# Patient Record
Sex: FEMALE | Race: WHITE | NOT HISPANIC OR LATINO | Employment: OTHER | URBAN - METROPOLITAN AREA
[De-identification: names, ages, dates, MRNs, and addresses within clinical notes are randomized per-mention and may not be internally consistent; named-entity substitution may affect disease eponyms.]

---

## 2017-03-06 ENCOUNTER — TRANSFERRED RECORDS (OUTPATIENT)
Dept: HEALTH INFORMATION MANAGEMENT | Facility: CLINIC | Age: 62
End: 2017-03-06

## 2017-05-05 ENCOUNTER — AMBULATORY - HEALTHEAST (OUTPATIENT)
Dept: INTERNAL MEDICINE | Facility: CLINIC | Age: 62
End: 2017-05-05

## 2017-05-05 ENCOUNTER — COMMUNICATION - HEALTHEAST (OUTPATIENT)
Dept: INTERNAL MEDICINE | Facility: CLINIC | Age: 62
End: 2017-05-05

## 2017-07-21 ENCOUNTER — MEDICAL CORRESPONDENCE (OUTPATIENT)
Dept: HEALTH INFORMATION MANAGEMENT | Facility: CLINIC | Age: 62
End: 2017-07-21

## 2018-01-08 ENCOUNTER — RECORDS - HEALTHEAST (OUTPATIENT)
Dept: ADMINISTRATIVE | Facility: OTHER | Age: 63
End: 2018-01-08

## 2018-03-22 ENCOUNTER — TRANSFERRED RECORDS (OUTPATIENT)
Dept: HEALTH INFORMATION MANAGEMENT | Facility: CLINIC | Age: 63
End: 2018-03-22

## 2018-05-25 ENCOUNTER — TRANSFERRED RECORDS (OUTPATIENT)
Dept: HEALTH INFORMATION MANAGEMENT | Facility: CLINIC | Age: 63
End: 2018-05-25

## 2018-07-19 ENCOUNTER — TELEPHONE (OUTPATIENT)
Dept: OPHTHALMOLOGY | Facility: CLINIC | Age: 63
End: 2018-07-19

## 2018-07-19 NOTE — TELEPHONE ENCOUNTER
[facility]    Phone Message    May a detailed message be left on voicemail: yes    Reason for Call: Other: Pt has an appt with Nettie on 07/24 that she would like to reschedule but Nettie's next appt is not until 09/04. I offered to schedule Pt with Dr Watts on 07/31 who also does see for glaucoma as well. Pt is concerned because she has a fairly complicated glaucoma case. She would like to seen sooner than what is available but would like to be seen by someone who specializes in it. Pt states that she has gone through most of the known glaucoma eye medication/drops that most people use to help and Pt is coming to the end of the medication line. At this point, Pt's eyes are really red and mattery. Her vision is not good and she is having some complications between the 3 medications that she is taking right now as well. Pt will be on the road but will hang by her phone and would like to speak to someone asa to resolve this scheduling conflict right away. Pt wants to know if she can't reschedule her appt with Nettie to a date that is sooner than what is available, if seeing Stefanie for her complicated case would suffice. Please call and leave a detailed vm if unable to reach Pt on her mobile.    Action Taken: Message routed to:  [facility]    
After speaking to Yadiel the soonest we could get her in is Aug 16 at either 8 am or 130 pm.   I left a message on Katty's mobile phone # stating that she can make either apt and if those deluca not work we will have to look at our sched again.     
No

## 2018-07-24 ENCOUNTER — OFFICE VISIT (OUTPATIENT)
Dept: OPHTHALMOLOGY | Facility: CLINIC | Age: 63
End: 2018-07-24
Attending: OPHTHALMOLOGY
Payer: COMMERCIAL

## 2018-07-24 DIAGNOSIS — Z96.1 PSEUDOPHAKIA OF BOTH EYES: ICD-10-CM

## 2018-07-24 DIAGNOSIS — H40.1132 PRIMARY OPEN ANGLE GLAUCOMA OF BOTH EYES, MODERATE STAGE: Primary | ICD-10-CM

## 2018-07-24 DIAGNOSIS — H40.9 GLAUCOMA: ICD-10-CM

## 2018-07-24 PROCEDURE — 92083 EXTENDED VISUAL FIELD XM: CPT | Mod: ZF | Performed by: OPHTHALMOLOGY

## 2018-07-24 PROCEDURE — 92133 CPTRZD OPH DX IMG PST SGM ON: CPT | Mod: ZF | Performed by: OPHTHALMOLOGY

## 2018-07-24 PROCEDURE — G0463 HOSPITAL OUTPT CLINIC VISIT: HCPCS | Mod: ZF

## 2018-07-24 RX ORDER — ESTRADIOL 0.1 MG/G
1 CREAM VAGINAL PRN
COMMUNITY
Start: 2016-06-23 | End: 2021-09-10

## 2018-07-24 RX ORDER — BETAXOLOL HYDROCHLORIDE 5 MG/ML
1 SOLUTION/ DROPS OPHTHALMIC 2 TIMES DAILY
COMMUNITY
Start: 2016-12-06 | End: 2019-01-17

## 2018-07-24 RX ORDER — BRINZOLAMIDE 10 MG/ML
1 SUSPENSION/ DROPS OPHTHALMIC 2 TIMES DAILY
COMMUNITY
End: 2019-02-22

## 2018-07-24 ASSESSMENT — VISUAL ACUITY
OS_CC: 20/20
OD_CC+: -1
METHOD: SNELLEN - LINEAR
OD_CC: 20/20

## 2018-07-24 ASSESSMENT — REFRACTION_WEARINGRX
OS_AXIS: 021
SPECS_TYPE: PAL
OD_AXIS: 164
OS_SPHERE: -0.50
OD_ADD: +1.50
OD_CYLINDER: +0.75
OD_SPHERE: -2.00
OS_ADD: +1.50
OS_CYLINDER: +0.75

## 2018-07-24 ASSESSMENT — CONF VISUAL FIELD
METHOD: COUNTING FINGERS
OD_NORMAL: 1
OS_NORMAL: 1

## 2018-07-24 ASSESSMENT — CUP TO DISC RATIO
OS_RATIO: 0.8
OD_RATIO: 0.9

## 2018-07-24 ASSESSMENT — PACHYMETRY
OD_CT(UM): 557
OS_CT(UM): 536

## 2018-07-24 ASSESSMENT — TONOMETRY
IOP_METHOD: APPLANATION
OS_IOP_MMHG: 12
OS_IOP_MMHG: 10
OD_IOP_MMHG: 12
OD_IOP_MMHG: 10
IOP_METHOD: TONOPEN

## 2018-07-24 ASSESSMENT — SLIT LAMP EXAM - LIDS
COMMENTS: NORMAL
COMMENTS: NORMAL

## 2018-07-24 NOTE — PROGRESS NOTES
1)POAG -- s/p SLT OU (OD:1/17 and OS:3/17), H/O Hypotension in past that is now in normal range, H/O Bradycardia and Migraines in past, first told about cupping in 20's -- K pachy: 557/536   Tmax:(28/30 in 11/13) but usu C-[id number]-G82w per old notes     HVF: OD:Superior arcuate and OS: Full (Full OU in 2010 with OD:superior arcuate developing in 2014)  CDR: 0.9/0.8    HRT/OCT: OD:Mild RNFl thinning and OS:RNFL WNL      FHX of Glc: No      Gonio:       Intolerant to: AGN -- itchy eyes, Dorzolamide -- injection, T1/2 -- per old notes but never tried 2/2 concern over heartrate, Latanoprost -- injection/itchy (could re-try if necessary), Azopt -- blurry       Asthma/COPD: No   Steroid Use: No     Kidney Stones: No    Sulfa Allergy:  No    IOP targets:?M-Hteens (OD:Mteens and OS:Hteens) -- IOP good   2)MGD/HOANG s/p punctal plugs by Dr. 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  A long discussion of the risks, benefits, and alternatives including potential treatment and management options were had with patient and a decision was made to trial off Azopt (brinzolamide) which is an orange top drop.  Patient will return to clinic in 1-2 months with repeat IOP check and gonioscopy.     Attending Physician Attestation:  Complete documentation of historical and exam elements from today's encounter can be found in the full encounter summary report (not reduplicated in this progress note). I personally obtained the chief complaint(s) and history of present illness.  I confirmed and edited as necessary the review of systems, past medical/surgical history, family history, social history, and examination findings as documented by others; and I examined the patient myself. I personally reviewed the relevant tests, images, and reports as  documented above. I formulated and edited as necessary the assessment and plan and discussed the findings and management plan with the patient and family.  - Kitty Sheffield MD

## 2018-07-24 NOTE — MR AVS SNAPSHOT
After Visit Summary   7/24/2018    Katty Patton    MRN: [id number]           Patient Information     Date Of Birth          1955        Visit Information        Provider Department      7/24/2018 12:30 PM [facility]        Today's Diagnoses     Primary open angle glaucoma of both eyes, moderate stage    -  1    Glaucoma        Pseudophakia of both eyes          Care Instructions      Patient will continue on Zioptan at bedtime in BOTH EYES and Betaxolol 2x/day in BOTH EYES.  A long discussion of the risks, benefits, and alternatives including potential treatment and management options were had with patient and a decision was made to trial off Azopt (brinzolamide) which is an orange top drop.  Patient will return to clinic in 1-2 months with repeat IOP check and gonioscopy.           Follow-ups after your visit        Follow-up notes from your care team     Return 1-2 months with repeat IOP check and gonioscopy. .      Future tests that were ordered for you today     Open Future Orders        Priority Expected Expires Ordered    Pachymetry OU (both eyes) Routine  1/21/2020 7/24/2018    DILATED FUNDUS EXAM Routine  7/24/2019 7/24/2018            Who to contact     Please call your clinic at [phone] to:    Ask questions about your health    Make or cancel appointments    Discuss your medicines    Learn about your test results    Speak to your doctor            Additional Information About Your Visit        [email or web address] Information     [employer] gives you secure access to your electronic health record. If you see a primary care provider, you can also send messages to your care team and make appointments. If you have questions, please call your primary care clinic.  If you do not have a primary care provider, please call [phone] and they will assist you.      [employer] is an electronic gateway that provides easy, online access to your medical records. With [employer], you can  request a clinic appointment, read your test results, renew a prescription or communicate with your care team.     To access your existing account, please contact your [facility] or call [phone] for assistance.        Care EveryWhere ID     This is your Care EveryWhere ID. This could be used by other organizations to access your [place] medical records  [id number]         Blood Pressure from Last 3 Encounters:   No data found for BP    Weight from Last 3 Encounters:   No data found for Wt              We Performed the Following     OCT Optic Nerve RNFL Spectralis OU (both eyes)     OVF 24-2 Dynamic OU        Primary Care Provider Fax #    Physician No Ref-Primary [phone]       No address on file        Equal Access to Services     HEIDI BRANHAM : Hadii carolina De La Garza, waaxda jeni, qajaylan kaalmada lennox, aleisha ruth . So [facility] [phone].    ATENCIÓN: Si habla español, tiene a olsen disposición servicios gratuitos de asistencia lingüística. Llame al [phone].    We comply with applicable federal civil rights laws and [place] laws. We do not discriminate on the basis of race, color, national origin, age, disability, sex, sexual orientation, or gender identity.            Thank you!     Thank you for choosing [facility]  for your care. Our goal is always to provide you with excellent care. Hearing back from our patients is one way we can continue to improve our services. Please take a few minutes to complete the written survey that you may receive in the mail after your visit with us. Thank you!             Your Updated Medication List - Protect others around you: Learn how to safely use, store and throw away your medicines at www.disposemymeds.org.          This list is accurate as of 7/24/18  4:18 PM.  Always use your most recent med list.                   Brand Name Dispense Instructions for use Diagnosis    AZOPT 1 %  ophthalmic susp   Generic drug:  brinzolamide      Take 1 drop by mouth 2 times daily        betaxolol 0.5 % ophthalmic solution    BETOPTIC     Place 1 drop into both eyes 2 times daily        ESTRACE VAGINAL 0.1 MG/GM cream   Generic drug:  estradiol      Take 1 mg by mouth as needed        omeprazole 20 MG CR capsule    priLOSEC     Take 1 capsule by mouth daily        ZIOPTAN 0.0015 % Soln   Generic drug:  Tafluprost      1 Dropperette daily

## 2018-07-24 NOTE — PATIENT INSTRUCTIONS
Patient will continue on Zioptan at bedtime in BOTH EYES and Betaxolol 2x/day in BOTH EYES.  A long discussion of the risks, benefits, and alternatives including potential treatment and management options were had with patient and a decision was made to trial off Azopt (brinzolamide) which is an orange top drop.  Patient will return to clinic in 1-2 months with repeat IOP check and gonioscopy.

## 2018-07-25 ENCOUNTER — HEALTH MAINTENANCE LETTER (OUTPATIENT)
Age: 63
End: 2018-07-25

## 2018-08-07 ENCOUNTER — COMMUNICATION - HEALTHEAST (OUTPATIENT)
Dept: SCHEDULING | Facility: CLINIC | Age: 63
End: 2018-08-07

## 2018-08-07 ENCOUNTER — AMBULATORY - HEALTHEAST (OUTPATIENT)
Dept: INTERNAL MEDICINE | Facility: CLINIC | Age: 63
End: 2018-08-07

## 2018-09-28 ENCOUNTER — OFFICE VISIT (OUTPATIENT)
Dept: OPHTHALMOLOGY | Facility: CLINIC | Age: 63
End: 2018-09-28
Payer: COMMERCIAL

## 2018-09-28 DIAGNOSIS — H40.1132 PRIMARY OPEN ANGLE GLAUCOMA OF BOTH EYES, MODERATE STAGE: Primary | ICD-10-CM

## 2018-09-28 DIAGNOSIS — Z96.1 PSEUDOPHAKIA OF BOTH EYES: ICD-10-CM

## 2018-09-28 ASSESSMENT — VISUAL ACUITY
METHOD: SNELLEN - LINEAR
OD_CC: 20/20
OS_CC: 20/20
CORRECTION_TYPE: GLASSES

## 2018-09-28 ASSESSMENT — GONIOSCOPY
OD_INFERIOR: 4
ADDITIONAL_COMMENTS: 1+ TMP OU
OS_TEMPORAL: 4
OD_SUPERIOR: 4
OS_INFERIOR: 4
OS_NASAL: 4
OD_NASAL: 4
OD_TEMPORAL: 4
OS_SUPERIOR: 4

## 2018-09-28 ASSESSMENT — CONF VISUAL FIELD
OS_NORMAL: 1
OD_NORMAL: 1

## 2018-09-28 ASSESSMENT — TONOMETRY
IOP_METHOD: TONOPEN
IOP_METHOD: APPLANATION
OD_IOP_MMHG: 15
OS_IOP_MMHG: 16
OS_IOP_MMHG: 16
OD_IOP_MMHG: 17
OD_IOP_MMHG: 15
IOP_METHOD: APPLANATION
OS_IOP_MMHG: 16

## 2018-09-28 ASSESSMENT — SLIT LAMP EXAM - LIDS
COMMENTS: NORMAL
COMMENTS: NORMAL

## 2018-09-28 NOTE — PATIENT INSTRUCTIONS
Patient will continue on Zioptan at bedtime in BOTH EYES and Betaxolol 2x/day in BOTH EYES.  A long discussion of the risks, benefits, and alternatives including potential treatment and management options were had with patient and a decision was made to trial off Azopt (brinzolamide) which is an orange top drop.  Patient will return to clinic in 4-6 months with repeat IOP check and visual field test (OD:only).

## 2018-09-28 NOTE — PROGRESS NOTES
1)POAG -- s/p SLT OU (OD:1/17 and OS:3/17), H/O Hypotension in past that is now in normal range, H/O Bradycardia and Migraines in past, first told about cupping in 20's -- K pachy: 557/536   Tmax:(28/30 in 11/13) but usu S-[id number]-S81f per old notes     HVF: OD:Superior arcuate and OS: Full (Full OU in 2010 with OD:superior arcuate developing in 2014)  CDR: 0.9/0.8    HRT/OCT: OD:Mild RNFl thinning and OS:RNFL WNL      FHX of Glc: No      Gonio: open      Intolerant to: AGN -- itchy eyes, Dorzolamide -- injection, T1/2 -- per old notes but never tried 2/2 concern over heartrate, Latanoprost -- injection/itchy (could re-try if necessary), Azopt -- blurry       Asthma/COPD: No   Steroid Use: No     Kidney Stones: No    Sulfa Allergy:  No    IOP targets:?M-Hteens (OD:Mteens and OS:Hteens) -- IOP good   2)MGD/HOANG s/p punctal plugs by Dr. 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  A long discussion of the risks, benefits, and alternatives including potential treatment and management options were had with patient and a decision was made to trial off Azopt (brinzolamide) which is an orange top drop.  Patient will return to clinic in 4-6 months with repeat IOP check and visual field test (OD:only).     Attending Physician Attestation:  Complete documentation of historical and exam elements from today's encounter can be found in the full encounter summary report (not reduplicated in this progress note). I personally obtained the chief complaint(s) and history of present illness.  I confirmed and edited as necessary the review of systems, past medical/surgical history, family history, social history, and examination findings as documented by others; and I examined the patient myself.  I personally reviewed the relevant tests, images, and reports as documented above. I formulated and edited as necessary the assessment and plan and discussed the findings and management plan with the patient and family.  - Kitty Sheffield MD

## 2018-09-28 NOTE — MR AVS SNAPSHOT
After Visit Summary   9/28/2018    Katty Patton    MRN: [id number]           Patient Information     Date Of Birth          1955        Visit Information        Provider Department      9/28/2018 10:30 AM Kitty Sheffield MD [facility]        Today's Diagnoses     Primary open angle glaucoma of both eyes, moderate stage    -  1    Pseudophakia of both eyes          Care Instructions      Patient will continue on Zioptan at bedtime in BOTH EYES and Betaxolol 2x/day in BOTH EYES.  A long discussion of the risks, benefits, and alternatives including potential treatment and management options were had with patient and a decision was made to trial off Azopt (brinzolamide) which is an orange top drop.  Patient will return to clinic in 4-6 months with repeat IOP check and visual field test (OD:only).           Follow-ups after your visit        Follow-up notes from your care team     Return 4-6 months with repeat IOP check and visual field test (OD:only)..      Who to contact     Please call your clinic at [phone] to:    Ask questions about your health    Make or cancel appointments    Discuss your medicines    Learn about your test results    Speak to your doctor            Additional Information About Your Visit        [email or web address] Information     [email or web address] gives you secure access to your electronic health record. If you see a primary care provider, you can also send messages to your care team and make appointments. If you have questions, please call your primary care clinic.  If you do not have a primary care provider, please call [phone] and they will assist you.      [email or web address] is an electronic gateway that provides easy, online access to your medical records. With [email or web address], you can request a clinic appointment, read your test results, renew a prescription or communicate with your care team.     To access your existing account, please contact your [facility]  [place] [facility] or call [phone] for assistance.        Care EveryWhere ID     This is your Care EveryWhere ID. This could be used by other organizations to access your [place] medical records  [id number]         Blood Pressure from Last 3 Encounters:   No data found for BP    Weight from Last 3 Encounters:   No data found for Wt              We Performed the Following     GONIOSCOPY        Primary Care Provider Fax #    Physician No Ref-Primary [phone]       No address on file        Equal Access to Services     HEIDI BRANHAM : Hadii aad ku hadasho Soomaali, waaxda luqadaha, qaybta kaalmada adeegyada, waxay idiin hayaan adeeg khrileyjoyce la'alejandra . So [facility] [phone].    ATENCIÓN: Si habla español, tiene a olsen disposición servicios gratuitos de asistencia lingüística. [facility] [phone].    We comply with applicable federal civil rights laws and [place] laws. We do not discriminate on the basis of race, color, national origin, age, disability, sex, sexual orientation, or gender identity.            Thank you!     Thank you for choosing [facility]  for your care. Our goal is always to provide you with excellent care. Hearing back from our patients is one way we can continue to improve our services. Please take a few minutes to complete the written survey that you may receive in the mail after your visit with us. Thank you!             Your Updated Medication List - Protect others around you: Learn how to safely use, store and throw away your medicines at www.disposemymeds.org.          This list is accurate as of 9/28/18 11:07 AM.  Always use your most recent med list.                   Brand Name Dispense Instructions for use Diagnosis    AZOPT 1 % ophthalmic susp   Generic drug:  brinzolamide      Take 1 drop by mouth 2 times daily        betaxolol 0.5 % ophthalmic solution    BETOPTIC     Place 1 drop into both eyes 2 times daily        ESTRACE VAGINAL 0.1  MG/GM cream   Generic drug:  estradiol      Take 1 mg by mouth as needed        omeprazole 20 MG CR capsule    priLOSEC     Take 1 capsule by mouth daily        ZIOPTAN 0.0015 % Soln   Generic drug:  Tafluprost      1 Dropperette daily

## 2018-12-18 ENCOUNTER — COMMUNICATION - HEALTHEAST (OUTPATIENT)
Dept: INTERNAL MEDICINE | Facility: CLINIC | Age: 63
End: 2018-12-18

## 2018-12-18 DIAGNOSIS — N95.1 SYMPTOMATIC MENOPAUSAL OR FEMALE CLIMACTERIC STATES: ICD-10-CM

## 2019-01-17 DIAGNOSIS — H40.1132 PRIMARY OPEN ANGLE GLAUCOMA OF BOTH EYES, MODERATE STAGE: Primary | ICD-10-CM

## 2019-01-17 RX ORDER — BETAXOLOL HYDROCHLORIDE 5 MG/ML
1 SOLUTION/ DROPS OPHTHALMIC 2 TIMES DAILY
Qty: 15 ML | Refills: 3 | Status: SHIPPED | OUTPATIENT
Start: 2019-01-17 | End: 2021-09-10

## 2019-01-22 ENCOUNTER — COMMUNICATION - HEALTHEAST (OUTPATIENT)
Dept: INTERNAL MEDICINE | Facility: CLINIC | Age: 64
End: 2019-01-22

## 2019-01-29 ENCOUNTER — OFFICE VISIT - HEALTHEAST (OUTPATIENT)
Dept: INTERNAL MEDICINE | Facility: CLINIC | Age: 64
End: 2019-01-29

## 2019-01-29 DIAGNOSIS — Z12.31 VISIT FOR SCREENING MAMMOGRAM: ICD-10-CM

## 2019-01-29 DIAGNOSIS — K20.0 EOSINOPHILIC ESOPHAGITIS: ICD-10-CM

## 2019-01-29 DIAGNOSIS — Z00.00 PREVENTATIVE HEALTH CARE: ICD-10-CM

## 2019-01-29 DIAGNOSIS — H61.22 IMPACTED CERUMEN OF LEFT EAR: ICD-10-CM

## 2019-01-29 DIAGNOSIS — N95.1 SYMPTOMATIC MENOPAUSAL OR FEMALE CLIMACTERIC STATES: ICD-10-CM

## 2019-01-29 DIAGNOSIS — N90.7 VULVAR CYST: ICD-10-CM

## 2019-01-29 DIAGNOSIS — R00.1 BRADYCARDIA: ICD-10-CM

## 2019-01-29 LAB
ANION GAP SERPL CALCULATED.3IONS-SCNC: 10 MMOL/L (ref 5–18)
BUN SERPL-MCNC: 11 MG/DL (ref 8–22)
CALCIUM SERPL-MCNC: 9.6 MG/DL (ref 8.5–10.5)
CHLORIDE BLD-SCNC: 106 MMOL/L (ref 98–107)
CHOLEST SERPL-MCNC: 231 MG/DL
CO2 SERPL-SCNC: 27 MMOL/L (ref 22–31)
CREAT SERPL-MCNC: 0.77 MG/DL (ref 0.6–1.1)
ERYTHROCYTE [DISTWIDTH] IN BLOOD BY AUTOMATED COUNT: 10.9 % (ref 11–14.5)
FASTING STATUS PATIENT QL REPORTED: YES
GFR SERPL CREATININE-BSD FRML MDRD: >60 ML/MIN/1.73M2
GLUCOSE BLD-MCNC: 89 MG/DL (ref 70–125)
HCT VFR BLD AUTO: 39.6 % (ref 35–47)
HDLC SERPL-MCNC: 96 MG/DL
HGB BLD-MCNC: 13.4 G/DL (ref 12–16)
LDLC SERPL CALC-MCNC: 122 MG/DL
MCH RBC QN AUTO: 30.8 PG (ref 27–34)
MCHC RBC AUTO-ENTMCNC: 33.7 G/DL (ref 32–36)
MCV RBC AUTO: 91 FL (ref 80–100)
PLATELET # BLD AUTO: 196 THOU/UL (ref 140–440)
PMV BLD AUTO: 8.6 FL (ref 7–10)
POTASSIUM BLD-SCNC: 4.1 MMOL/L (ref 3.5–5)
RBC # BLD AUTO: 4.34 MILL/UL (ref 3.8–5.4)
SODIUM SERPL-SCNC: 143 MMOL/L (ref 136–145)
TRIGL SERPL-MCNC: 67 MG/DL
WBC: 3.8 THOU/UL (ref 4–11)

## 2019-01-29 ASSESSMENT — MIFFLIN-ST. JEOR: SCORE: 1115.59

## 2019-02-22 ENCOUNTER — OFFICE VISIT (OUTPATIENT)
Dept: OPHTHALMOLOGY | Facility: CLINIC | Age: 64
End: 2019-02-22
Payer: COMMERCIAL

## 2019-02-22 DIAGNOSIS — Z96.1 PSEUDOPHAKIA OF BOTH EYES: ICD-10-CM

## 2019-02-22 DIAGNOSIS — H40.1132 PRIMARY OPEN ANGLE GLAUCOMA OF BOTH EYES, MODERATE STAGE: Primary | ICD-10-CM

## 2019-02-22 RX ORDER — BUDESONIDE 1 MG/2ML
1 INHALANT ORAL
COMMUNITY
Start: 2016-10-17 | End: 2023-02-07

## 2019-02-22 ASSESSMENT — SLIT LAMP EXAM - LIDS
COMMENTS: NORMAL
COMMENTS: NORMAL

## 2019-02-22 ASSESSMENT — REFRACTION_WEARINGRX
OD_CYLINDER: +0.75
OS_ADD: +1.50
OD_AXIS: 164
OS_CYLINDER: +0.75
OD_SPHERE: -2.00
OD_ADD: +1.50
SPECS_TYPE: PAL
OS_SPHERE: -0.50
OS_AXIS: 021

## 2019-02-22 ASSESSMENT — TONOMETRY
OS_IOP_MMHG: 16
IOP_METHOD: APPLANATION
OD_IOP_MMHG: 17

## 2019-02-22 ASSESSMENT — VISUAL ACUITY
METHOD: SNELLEN - LINEAR
CORRECTION_TYPE: GLASSES
OS_CC: 20/20-2
OD_CC: 20/20

## 2019-02-22 ASSESSMENT — CONF VISUAL FIELD
METHOD: COUNTING FINGERS
OD_NORMAL: 1
OS_NORMAL: 1

## 2019-02-22 NOTE — PATIENT INSTRUCTIONS
Patient will continue on Zioptan at bedtime in BOTH EYES and Betaxolol 2x/day in BOTH EYES.  A long discussion of the risks, benefits, and alternatives including potential treatment and management options were had with patient and a decision was made to trial off Azopt (brinzolamide) which is an orange top drop.  Patient will return to clinic in 4-6 months with repeat IOP check and visual field test, dilated eye exam and OCT with RNFL analysis.

## 2019-02-22 NOTE — PROGRESS NOTES
1)POAG -- s/p SLT OU (OD:1/17 and OS:3/17), H/O Hypotension in past that is now in normal range, H/O Bradycardia and Migraines in past, first told about cupping in 20's -- K pachy: 557/536   Tmax:(28/30 in 11/13) but usu L-Jnuxdt-T65g per old notes     HVF: OD:Superior arcuate and OS: Full (Full OU in 2010 with OD:superior arcuate developing in 2014)  CDR: 0.9/0.8    HRT/OCT: OD:Mild RNFl thinning and OS:RNFL WNL      FHX of Glc: No      Gonio: open      Intolerant to: AGN -- itchy eyes, Dorzolamide -- injection, T1/2 -- per old notes but never tried 2/2 concern over heartrate, Latanoprost -- injection/itchy (could re-try if necessary), Azopt -- blurry       Asthma/COPD: No   Steroid Use: No     Kidney Stones: No    Sulfa Allergy:  No    IOP targets:?M-Hteens (OD:Mteens and OS:Hteens) -- IOP good   2)MGD/HOANG s/p punctal plugs by Dr. 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  A long discussion of the risks, benefits, and alternatives including potential treatment and management options were had with patient and a decision was made to trial off Azopt (brinzolamide) which is an orange top drop.  Patient will return to clinic in 4-6 months with repeat IOP check and visual field test, dilated eye exam and OCT with RNFL analysis.     Attending Physician Attestation:  Complete documentation of historical and exam elements from today's encounter can be found in the full encounter summary report (not reduplicated in this progress note). I personally obtained the chief complaint(s) and history of present illness.  I confirmed and edited as necessary the review of systems, past medical/surgical history, family history, social history, and examination findings as documented by others;  and I examined the patient myself. I personally reviewed the relevant tests, images, and reports as documented above. I formulated and edited as necessary the assessment and plan and discussed the findings and management plan with the patient and family.  - Kitty Sheffield MD

## 2019-02-25 ENCOUNTER — TELEPHONE (OUTPATIENT)
Dept: OPHTHALMOLOGY | Facility: CLINIC | Age: 64
End: 2019-02-25

## 2019-02-25 NOTE — TELEPHONE ENCOUNTER
Prior Authorization Retail Medication Request    Medication/Dose: ZIOPTAN 0.0015% EYE DROPS  ICD code (if different than what is on RX):  SEE CHART  Previously Tried and Failed:  SEE CHART  Rationale:  SEE CHART    Insurance Name:  [facility]  Insurance ID:  [id number]      Pharmacy Information (if different than what is on RX)  [facility]  Phone:  FAX: [phone]

## 2019-02-26 NOTE — TELEPHONE ENCOUNTER
PA Initiation    Medication: ZIOPTAN 0.0015% EYE DROPS -   Insurance Company: [employer] - Phone [phone] Fax [phone]  [id number] [place] [facility] [place] [place] - [facility]  Filling Pharmacy Phone: [phone]  Filling Pharmacy Fax: [phone]  Start Date: 2/26/2019

## 2019-03-01 NOTE — TELEPHONE ENCOUNTER
Prior Authorization Approval    Authorization Effective Date: 1/27/2019  Authorization Expiration Date: 2/26/2020  Medication: ZIOPTAN 0.0015% EYE DROPS - APPROVED  Approved Dose/Quantity:   Reference #: [id number]   Insurance Company: [employer] - Phone [phone] Fax [phone]  Expected CoPay:       CoPay Card Available:      Foundation Assistance Needed:    Which Pharmacy is filling the prescription (Not needed for infusion/clinic administered): [id number] [place] [address]  Pharmacy Notified: Yes  Patient Notified: Comment:  **Pharmacy will notify patient when script is ready for .**

## 2019-05-01 ENCOUNTER — COMMUNICATION - HEALTHEAST (OUTPATIENT)
Dept: INTERNAL MEDICINE | Facility: CLINIC | Age: 64
End: 2019-05-01

## 2019-05-14 ENCOUNTER — COMMUNICATION - HEALTHEAST (OUTPATIENT)
Dept: INTERNAL MEDICINE | Facility: CLINIC | Age: 64
End: 2019-05-14

## 2019-05-14 DIAGNOSIS — Z12.31 ENCOUNTER FOR SCREENING MAMMOGRAM FOR BREAST CANCER: ICD-10-CM

## 2019-05-14 DIAGNOSIS — Z00.00 PREVENTATIVE HEALTH CARE: ICD-10-CM

## 2019-05-14 DIAGNOSIS — Z91.89 AT RISK FOR DECREASED BONE DENSITY: ICD-10-CM

## 2019-05-15 ENCOUNTER — RECORDS - HEALTHEAST (OUTPATIENT)
Dept: MAMMOGRAPHY | Facility: CLINIC | Age: 64
End: 2019-05-15

## 2019-05-15 DIAGNOSIS — Z12.31 ENCOUNTER FOR SCREENING MAMMOGRAM FOR MALIGNANT NEOPLASM OF BREAST: ICD-10-CM

## 2019-05-15 DIAGNOSIS — Z00.00 ENCOUNTER FOR GENERAL ADULT MEDICAL EXAMINATION WITHOUT ABNORMAL FINDINGS: ICD-10-CM

## 2019-05-23 ENCOUNTER — HOSPITAL ENCOUNTER (OUTPATIENT)
Dept: MAMMOGRAPHY | Facility: CLINIC | Age: 64
Discharge: HOME | End: 2019-05-23
Attending: INTERNAL MEDICINE

## 2019-05-23 DIAGNOSIS — N64.89 BREAST ASYMMETRY: ICD-10-CM

## 2019-06-04 ENCOUNTER — COMMUNICATION - HEALTHEAST (OUTPATIENT)
Dept: INTERNAL MEDICINE | Facility: CLINIC | Age: 64
End: 2019-06-04

## 2019-06-04 DIAGNOSIS — N95.1 SYMPTOMATIC MENOPAUSAL OR FEMALE CLIMACTERIC STATES: ICD-10-CM

## 2019-07-05 ENCOUNTER — OFFICE VISIT - HEALTHEAST (OUTPATIENT)
Dept: INTERNAL MEDICINE | Facility: CLINIC | Age: 64
End: 2019-07-05

## 2019-07-05 DIAGNOSIS — R19.7 DIARRHEA OF PRESUMED INFECTIOUS ORIGIN: ICD-10-CM

## 2019-07-05 DIAGNOSIS — F41.9 ANXIETY: ICD-10-CM

## 2019-07-05 DIAGNOSIS — N95.1 SYMPTOMATIC MENOPAUSAL OR FEMALE CLIMACTERIC STATES: ICD-10-CM

## 2019-07-05 ASSESSMENT — MIFFLIN-ST. JEOR: SCORE: 1112.41

## 2019-07-31 ENCOUNTER — COMMUNICATION - HEALTHEAST (OUTPATIENT)
Dept: INTERNAL MEDICINE | Facility: CLINIC | Age: 64
End: 2019-07-31

## 2019-08-01 ENCOUNTER — COMMUNICATION - HEALTHEAST (OUTPATIENT)
Dept: INTERNAL MEDICINE | Facility: CLINIC | Age: 64
End: 2019-08-01

## 2019-08-01 ENCOUNTER — AMBULATORY - HEALTHEAST (OUTPATIENT)
Dept: LAB | Facility: CLINIC | Age: 64
End: 2019-08-01

## 2019-08-01 DIAGNOSIS — R19.7 DIARRHEA OF PRESUMED INFECTIOUS ORIGIN: ICD-10-CM

## 2019-08-02 ENCOUNTER — COMMUNICATION - HEALTHEAST (OUTPATIENT)
Dept: INTERNAL MEDICINE | Facility: CLINIC | Age: 64
End: 2019-08-02

## 2019-08-02 LAB
O+P STL MICRO: NORMAL
SHIGA TOXIN 1: NEGATIVE
SHIGA TOXIN 2: NEGATIVE

## 2019-08-04 LAB — BACTERIA SPEC CULT: NORMAL

## 2019-08-06 ENCOUNTER — OFFICE VISIT - HEALTHEAST (OUTPATIENT)
Dept: INTERNAL MEDICINE | Facility: CLINIC | Age: 64
End: 2019-08-06

## 2019-08-06 ENCOUNTER — RECORDS - HEALTHEAST (OUTPATIENT)
Dept: GENERAL RADIOLOGY | Facility: CLINIC | Age: 64
End: 2019-08-06

## 2019-08-06 DIAGNOSIS — M53.3 SACROCOCCYGEAL DISORDERS, NOT ELSEWHERE CLASSIFIED: ICD-10-CM

## 2019-08-06 DIAGNOSIS — R19.5 LOOSE STOOLS: ICD-10-CM

## 2019-08-06 DIAGNOSIS — M53.3 SACROILIAC JOINT PAIN: ICD-10-CM

## 2019-08-06 ASSESSMENT — MIFFLIN-ST. JEOR: SCORE: 1110.6

## 2019-08-07 ENCOUNTER — COMMUNICATION - HEALTHEAST (OUTPATIENT)
Dept: INTERNAL MEDICINE | Facility: CLINIC | Age: 64
End: 2019-08-07

## 2019-09-26 ENCOUNTER — OFFICE VISIT (OUTPATIENT)
Dept: OPHTHALMOLOGY | Facility: CLINIC | Age: 64
End: 2019-09-26
Attending: OPHTHALMOLOGY
Payer: COMMERCIAL

## 2019-09-26 DIAGNOSIS — H25.13 NUCLEAR SCLEROSIS OF BOTH EYES: ICD-10-CM

## 2019-09-26 DIAGNOSIS — H40.1132 PRIMARY OPEN ANGLE GLAUCOMA OF BOTH EYES, MODERATE STAGE: Primary | ICD-10-CM

## 2019-09-26 PROCEDURE — 92083 EXTENDED VISUAL FIELD XM: CPT | Mod: ZF | Performed by: OPHTHALMOLOGY

## 2019-09-26 PROCEDURE — 92133 CPTRZD OPH DX IMG PST SGM ON: CPT | Mod: ZF | Performed by: OPHTHALMOLOGY

## 2019-09-26 PROCEDURE — G0463 HOSPITAL OUTPT CLINIC VISIT: HCPCS | Mod: ZF

## 2019-09-26 RX ORDER — BETAXOLOL HYDROCHLORIDE 5 MG/ML
1-2 SOLUTION/ DROPS OPHTHALMIC 2 TIMES DAILY
Qty: 15 ML | Refills: 11 | Status: SHIPPED | OUTPATIENT
Start: 2019-09-26 | End: 2021-09-10

## 2019-09-26 ASSESSMENT — REFRACTION_WEARINGRX
OS_AXIS: 021
OD_SPHERE: -2.00
OS_ADD: +1.50
OS_CYLINDER: +0.75
OD_AXIS: 164
OD_CYLINDER: +0.75
SPECS_TYPE: PAL
OD_ADD: +1.50
OS_SPHERE: -0.50

## 2019-09-26 ASSESSMENT — TONOMETRY
OD_IOP_MMHG: 20
IOP_METHOD: APPLANATION
OS_IOP_MMHG: 22
OD_IOP_MMHG: 21
IOP_METHOD: APPLANATION
OS_IOP_MMHG: 21

## 2019-09-26 ASSESSMENT — CONF VISUAL FIELD
OS_NORMAL: 1
METHOD: COUNTING FINGERS
OD_NORMAL: 1

## 2019-09-26 ASSESSMENT — CUP TO DISC RATIO
OS_RATIO: 0.8
OD_RATIO: 0.9

## 2019-09-26 ASSESSMENT — SLIT LAMP EXAM - LIDS
COMMENTS: NORMAL
COMMENTS: NORMAL

## 2019-09-26 ASSESSMENT — VISUAL ACUITY
METHOD: SNELLEN - LINEAR
CORRECTION_TYPE: GLASSES
OD_CC: 20/20
OS_CC: 20/20

## 2019-09-26 NOTE — NURSING NOTE
Chief Complaints and History of Present Illnesses   Patient presents with     Follow Up     Primary open angle glaucoma of both eyes     Chief Complaint(s) and History of Present Illness(es)     Follow Up     Associated symptoms: dryness and redness (around eyes, that she believes is related to her eye drops).  Negative for eye pain and tearing    Pain scale: 0/10    Comments: Primary open angle glaucoma of both eyes              Comments     She states that her vision has seemed stable in both eyes since her last eye exam.   She has some blurred vision that she can blink away. Using artificial tears also helps.    She is very compliant in using her drops as directed.    Yasemin Condon, LARISA 9:55 AM  September 26, 2019

## 2019-09-26 NOTE — PROGRESS NOTES
1)POAG -- s/p SLT OU (OD:1/17 and OS:3/17), H/O Hypotension in past that is now in normal range, H/O Bradycardia and Migraines in past, first told about cupping in 20's -- K pachy: 557/536   Tmax:(28/30 in 11/13) but usu F-Xxrmah-E35t per old notes     HVF: OD:Superior arcuate and OS: Full (Full OU in 2010 with OD:superior arcuate developing in 2014)  CDR: 0.9/0.8    HRT/OCT: OD:Mild RNFl thinning and OS:RNFL WNL      FHX of Glc: No      Gonio: open      Intolerant to: AGN -- itchy eyes, Dorzolamide -- injection, T1/2 -- per old notes but never tried 2/2 concern over heartrate, Latanoprost -- injection/itchy (could re-try if necessary), Azopt -- blurry       Asthma/COPD: No   Steroid Use: No     Kidney Stones: No    Sulfa Allergy:  No    IOP targets:?M-Hteens (OD:Mteens and OS:Hteens) -- IOP good   2)MGD/HOANG s/p punctal plugs by Dr. 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  A long discussion of the risks, benefits, and alternatives including potential treatment and management options were had with patient and a decision was made to trial Rhopressa 1x/day in the RIGHT EYE ONLY.  Patient will return to clinic in 1-2 months with repeat IOP check.     Attending Physician Attestation:  Complete documentation of historical and exam elements from today's encounter can be found in the full encounter summary report (not reduplicated in this progress note). I personally obtained the chief complaint(s) and history of present illness.  I confirmed and edited as necessary the review of systems, past medical/surgical history, family history, social history, and examination findings as documented by  others; and I examined the patient myself. I personally reviewed the relevant tests, images, and reports as documented above. I formulated and edited as necessary the assessment and plan and discussed the findings and management plan with the patient and family.  - Kitty Sheffield MD

## 2019-09-26 NOTE — PATIENT INSTRUCTIONS
Patient will continue on Zioptan at bedtime in BOTH EYES and Betaxolol 2x/day in BOTH EYES.  A long discussion of the risks, benefits, and alternatives including potential treatment and management options were had with patient and a decision was made to trial Rhopressa 1x/day in the RIGHT EYE ONLY.  Patient will return to clinic in 1-2 months with repeat IOP check.

## 2019-10-17 ENCOUNTER — TELEPHONE (OUTPATIENT)
Dept: OPHTHALMOLOGY | Facility: CLINIC | Age: 64
End: 2019-10-17

## 2019-10-17 NOTE — TELEPHONE ENCOUNTER
"Spoke with patient, per patient it is not co-pay, she was trying to get a cost estimate and they wouldn't provide without \"prior authorization order\". Patient said she was confused and was hoping [facility].   Requested call back, ok to leave message ([place]) -em 3:02 PM October 17, 2019    [id number] [place] [place] - [facility] [address] [phone] (Phone)  [phone] (Fax)       Per patient she didn't remember hand carrying an Rx in (possibly pt confused and pharmacist requesting Order before providing estimate)  "

## 2019-10-18 NOTE — TELEPHONE ENCOUNTER
Called insurance and did a verbal prior auth via the phone call as urgent request.  Rhopressa has been approved effetive dates:  9/18/19-10/17/20, Case ID:  [id number]. I call/spoke to pharmacist to have the claim re-process and the claim went through insurance but it cost $384.75 for 5ML/75 day supply.  Pt must of have a high deductible as it's very high co-pay.  I also called and left a voice message at pt's home and advised pt to contact [facility] if she want to  the med as pharmacy needs to hear back from pt before they can order them.

## 2019-10-28 ENCOUNTER — COMMUNICATION - HEALTHEAST (OUTPATIENT)
Dept: INTERNAL MEDICINE | Facility: CLINIC | Age: 64
End: 2019-10-28

## 2019-11-05 ENCOUNTER — OFFICE VISIT (OUTPATIENT)
Dept: OPHTHALMOLOGY | Facility: CLINIC | Age: 64
End: 2019-11-05
Attending: OPHTHALMOLOGY
Payer: COMMERCIAL

## 2019-11-05 DIAGNOSIS — H25.13 NUCLEAR SCLEROSIS OF BOTH EYES: ICD-10-CM

## 2019-11-05 DIAGNOSIS — H40.1132 PRIMARY OPEN ANGLE GLAUCOMA OF BOTH EYES, MODERATE STAGE: Primary | ICD-10-CM

## 2019-11-05 PROCEDURE — 92015 DETERMINE REFRACTIVE STATE: CPT | Mod: ZF

## 2019-11-05 PROCEDURE — G0463 HOSPITAL OUTPT CLINIC VISIT: HCPCS | Mod: ZF

## 2019-11-05 ASSESSMENT — TONOMETRY
OD_IOP_MMHG: 14
OD_IOP_MMHG: 14
IOP_METHOD: APPLANATION
OS_IOP_MMHG: 16
IOP_METHOD: APPLANATION
OS_IOP_MMHG: 22

## 2019-11-05 ASSESSMENT — VISUAL ACUITY
OS_CC: 20/20
OD_CC+: -2
CORRECTION_TYPE: GLASSES
OD_CC: 20/25
METHOD: SNELLEN - LINEAR

## 2019-11-05 ASSESSMENT — REFRACTION_WEARINGRX
SPECS_TYPE: PAL
OS_CYLINDER: +0.75
OD_SPHERE: -2.00
OD_AXIS: 164
OD_CYLINDER: +0.75
OS_AXIS: 021
OS_ADD: +1.50
OS_SPHERE: -0.50
OD_ADD: +1.50

## 2019-11-05 ASSESSMENT — SLIT LAMP EXAM - LIDS
COMMENTS: NORMAL
COMMENTS: NORMAL

## 2019-11-05 ASSESSMENT — REFRACTION_MANIFEST
OD_SPHERE: -2.25
OD_AXIS: 015
OS_CYLINDER: +0.50
OS_SPHERE: -2.00
OD_ADD: +2.25
OS_AXIS: 172
OD_CYLINDER: +0.75
OS_ADD: +2.25

## 2019-11-05 ASSESSMENT — CONF VISUAL FIELD
OS_NORMAL: 1
OD_NORMAL: 1

## 2019-11-05 NOTE — PATIENT INSTRUCTIONS
Patient will continue on Zioptan at bedtime in BOTH EYES, Betaxolol 2x/day in BOTH EYES, and Rhopressa 1x/day in the RIGHT EYE ONLY.  Patient will return to clinic in 2-3 months with repeat IOP check.

## 2019-11-05 NOTE — PROGRESS NOTES
1)POAG -- s/p SLT OU (OD:1/17 and OS:3/17), H/O Hypotension in past that is now in normal range, H/O Bradycardia and Migraines in past, first told about cupping in 20's -- K pachy: 557/536   Tmax:(28/30 in 11/13) but usu [id number] per old notes     HVF: OD:Superior arcuate and OS: Full (Full OU in 2010 with OD:superior arcuate developing in 2014)  CDR: 0.9/0.8    HRT/OCT: OD:Mild RNFl thinning and OS:RNFL WNL      FHX of Glc: No      Gonio: open      Intolerant to: AGN -- itchy eyes, Dorzolamide -- injection, T1/2 -- per old notes but never tried 2/2 concern over heartrate, Latanoprost -- injection/itchy (could re-try if necessary), Azopt -- blurry       Asthma/COPD: No   Steroid Use: No     Kidney Stones: No    Sulfa Allergy:  No    IOP targets:?M-Hteens (OD:Mteens and OS:Hteens) -- IOP good   2)MGD/HOANG s/p punctal plugs by Dr. 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  Patient will return to clinic in 2-3 months with repeat IOP check.     Attending Physician Attestation:  Complete documentation of historical and exam elements from today's encounter can be found in the full encounter summary report (not reduplicated in this progress note). I personally obtained the chief complaint(s) and history of present illness.  I confirmed and edited as necessary the review of systems, past medical/surgical history, family history, social history, and examination findings as documented by others; and I examined the patient myself. I personally reviewed the relevant tests, images, and reports as  documented above. I formulated and edited as necessary the assessment and plan and discussed the findings and management plan with the patient and family.  - Kitty Sheffield MD

## 2019-11-05 NOTE — NURSING NOTE
Chief Complaints and History of Present Illnesses   Patient presents with     Glaucoma Follow-Up     Chief Complaint(s) and History of Present Illness(es)     Glaucoma Follow-Up     Laterality: both eyes              Comments     Pt. States that she is doing well.  No change in VA BE.  BE are still really dry.  No pain BE.  Kiley PERES 10:42 AM November 5, 2019                    Name:  Jackie Fernández School Year:  12th Grade Class: Student ID No.:   Address:  [address] [place] [id number] Phone:  [phone] (home)  :  16year old   Name Relationship [address] Work Phone Home Phone Mobile Phone   1.  Misty Bachelor polymorphic ventricular tachycardia? No   15. Does anyone in your family have a heart problem, pacemaker, or implanted defibrillator? No   16. Has anyone in your family had unexplained fainting, seizures, or near drowning?  No   BONE AND JOINT QUESTIONS 37. Do you have headaches with exercise? No   38. Have you ever had numbness, tingling, or weakness in your arms or legs after being hit or falling? No   39. Have you ever been unable to move your arms / legs after being hit /fall? No   40.  Have you ever be Appearance:  Marfan stigmata (kyphoscoliosis, high-arched palate, pectus excavatum,      arachnodactyly, arm span > height, hyperlaxity, myopia, MVP, aortic insufficiency) Yes    Eyes/Ears/Nose/Throat:    · Pupils equal  · Hearing Yes    Lymph nodes Yes that I/our student will not use performance-enhancing substances as defined in the [facility] Performance-Enhancing Substance Testing Program Protocol.  We have reviewed the policy and understand that I/our student may be asked to submit to testing for the presen

## 2019-12-12 ENCOUNTER — COMMUNICATION - HEALTHEAST (OUTPATIENT)
Dept: INTERNAL MEDICINE | Facility: CLINIC | Age: 64
End: 2019-12-12

## 2019-12-26 ENCOUNTER — COMMUNICATION - HEALTHEAST (OUTPATIENT)
Dept: INTERNAL MEDICINE | Facility: CLINIC | Age: 64
End: 2019-12-26

## 2019-12-26 DIAGNOSIS — I73.00 RAYNAUD'S DISEASE WITHOUT GANGRENE: ICD-10-CM

## 2019-12-26 DIAGNOSIS — N95.1 SYMPTOMATIC MENOPAUSAL OR FEMALE CLIMACTERIC STATES: ICD-10-CM

## 2019-12-28 ENCOUNTER — COMMUNICATION - HEALTHEAST (OUTPATIENT)
Dept: INTERNAL MEDICINE | Facility: CLINIC | Age: 64
End: 2019-12-28

## 2019-12-28 DIAGNOSIS — N95.2 POSTMENOPAUSAL ATROPHIC VAGINITIS: ICD-10-CM

## 2020-01-28 ENCOUNTER — OFFICE VISIT - HEALTHEAST (OUTPATIENT)
Dept: INTERNAL MEDICINE | Facility: CLINIC | Age: 65
End: 2020-01-28

## 2020-01-28 DIAGNOSIS — D72.818 OTHER DECREASED WHITE BLOOD CELL (WBC) COUNT: ICD-10-CM

## 2020-01-28 DIAGNOSIS — Z00.00 ENCOUNTER FOR PREVENTIVE CARE: ICD-10-CM

## 2020-01-28 DIAGNOSIS — I73.00 RAYNAUD'S DISEASE WITHOUT GANGRENE: ICD-10-CM

## 2020-01-28 DIAGNOSIS — I49.1 PREMATURE ATRIAL COMPLEXES: ICD-10-CM

## 2020-01-28 LAB
BASOPHILS # BLD AUTO: 0.1 THOU/UL (ref 0–0.2)
BASOPHILS NFR BLD AUTO: 2 % (ref 0–2)
EOSINOPHIL # BLD AUTO: 0 THOU/UL (ref 0–0.4)
EOSINOPHIL NFR BLD AUTO: 0 % (ref 0–6)
ERYTHROCYTE [DISTWIDTH] IN BLOOD BY AUTOMATED COUNT: 10.7 % (ref 11–14.5)
ERYTHROCYTE [DISTWIDTH] IN BLOOD BY AUTOMATED COUNT: 12.2 % (ref 11–14.5)
HCT VFR BLD AUTO: 35.4 % (ref 35–47)
HCT VFR BLD AUTO: 35.8 % (ref 35–47)
HGB BLD-MCNC: 12.3 G/DL (ref 12–16)
HGB BLD-MCNC: 12.5 G/DL (ref 12–16)
LYMPHOCYTES # BLD AUTO: 1.3 THOU/UL (ref 0.8–4.4)
LYMPHOCYTES NFR BLD AUTO: 38 % (ref 20–40)
MCH RBC QN AUTO: 32.1 PG (ref 27–34)
MCH RBC QN AUTO: 33.2 PG (ref 27–34)
MCHC RBC AUTO-ENTMCNC: 34.7 G/DL (ref 32–36)
MCHC RBC AUTO-ENTMCNC: 35 G/DL (ref 32–36)
MCV RBC AUTO: 92 FL (ref 80–100)
MCV RBC AUTO: 95 FL (ref 80–100)
MONOCYTES # BLD AUTO: 0.2 THOU/UL (ref 0–0.9)
MONOCYTES NFR BLD AUTO: 6 % (ref 2–10)
NEUTROPHILS # BLD AUTO: 1.8 THOU/UL (ref 2–7.7)
NEUTROPHILS NFR BLD AUTO: 54 % (ref 50–70)
PLATELET # BLD AUTO: 212 THOU/UL (ref 140–440)
PLATELET # BLD AUTO: 217 THOU/UL (ref 140–440)
PMV BLD AUTO: 11.1 FL (ref 8.5–12.5)
PMV BLD AUTO: 7.8 FL (ref 7–10)
RBC # BLD AUTO: 3.78 MILL/UL (ref 3.8–5.4)
RBC # BLD AUTO: 3.83 MILL/UL (ref 3.8–5.4)
WBC: 3.4 THOU/UL (ref 4–11)
WBC: 3.4 THOU/UL (ref 4–11)

## 2020-01-28 ASSESSMENT — MIFFLIN-ST. JEOR: SCORE: 1104.25

## 2020-01-29 LAB
25(OH)D3 SERPL-MCNC: 42.2 NG/ML (ref 30–80)
25(OH)D3 SERPL-MCNC: 42.2 NG/ML (ref 30–80)
ALBUMIN SERPL-MCNC: 4.1 G/DL (ref 3.5–5)
ALP SERPL-CCNC: 42 U/L (ref 45–120)
ALT SERPL W P-5'-P-CCNC: 21 U/L (ref 0–45)
ANION GAP SERPL CALCULATED.3IONS-SCNC: 12 MMOL/L (ref 5–18)
AST SERPL W P-5'-P-CCNC: 28 U/L (ref 0–40)
BILIRUB SERPL-MCNC: 0.8 MG/DL (ref 0–1)
BUN SERPL-MCNC: 8 MG/DL (ref 8–22)
CALCIUM SERPL-MCNC: 9.2 MG/DL (ref 8.5–10.5)
CHLORIDE BLD-SCNC: 101 MMOL/L (ref 98–107)
CHOLEST SERPL-MCNC: 223 MG/DL
CO2 SERPL-SCNC: 25 MMOL/L (ref 22–31)
CREAT SERPL-MCNC: 0.75 MG/DL (ref 0.6–1.1)
FASTING STATUS PATIENT QL REPORTED: YES
GFR SERPL CREATININE-BSD FRML MDRD: >60 ML/MIN/1.73M2
GLUCOSE BLD-MCNC: 91 MG/DL (ref 70–125)
HDLC SERPL-MCNC: 94 MG/DL
HPV SOURCE: NORMAL
HUMAN PAPILLOMA VIRUS 16 DNA: NEGATIVE
HUMAN PAPILLOMA VIRUS 18 DNA: NEGATIVE
HUMAN PAPILLOMA VIRUS FINAL DIAGNOSIS: NORMAL
HUMAN PAPILLOMA VIRUS OTHER HR: NEGATIVE
LDLC SERPL CALC-MCNC: 118 MG/DL
POTASSIUM BLD-SCNC: 3.6 MMOL/L (ref 3.5–5)
PROT SERPL-MCNC: 6.8 G/DL (ref 6–8)
SODIUM SERPL-SCNC: 138 MMOL/L (ref 136–145)
SPECIMEN DESCRIPTION: NORMAL
TRIGL SERPL-MCNC: 54 MG/DL
TSH SERPL DL<=0.005 MIU/L-ACNC: 0.75 UIU/ML (ref 0.3–5)

## 2020-01-30 ENCOUNTER — OFFICE VISIT (OUTPATIENT)
Dept: OPHTHALMOLOGY | Facility: CLINIC | Age: 65
End: 2020-01-30
Attending: OPHTHALMOLOGY
Payer: COMMERCIAL

## 2020-01-30 DIAGNOSIS — H25.13 NUCLEAR SCLEROSIS OF BOTH EYES: ICD-10-CM

## 2020-01-30 DIAGNOSIS — H40.1132 PRIMARY OPEN ANGLE GLAUCOMA OF BOTH EYES, MODERATE STAGE: Primary | ICD-10-CM

## 2020-01-30 PROCEDURE — G0463 HOSPITAL OUTPT CLINIC VISIT: HCPCS | Mod: ZF

## 2020-01-30 ASSESSMENT — REFRACTION_WEARINGRX
OD_SPHERE: -2.00
OS_SPHERE: -0.50
OD_ADD: +1.50
OD_CYLINDER: +0.75
OS_AXIS: 021
OS_CYLINDER: +0.75
OD_AXIS: 164
OS_ADD: +1.50
SPECS_TYPE: PAL

## 2020-01-30 ASSESSMENT — VISUAL ACUITY
OD_CC+: -3
METHOD: SNELLEN - LINEAR
OD_CC: 20/25
CORRECTION_TYPE: GLASSES
OS_CC: 20/20

## 2020-01-30 ASSESSMENT — TONOMETRY
OD_IOP_MMHG: 15
OS_IOP_MMHG: 19
IOP_METHOD: APPLANATION

## 2020-01-30 ASSESSMENT — CONF VISUAL FIELD
OS_NORMAL: 1
OD_NORMAL: 1
METHOD: COUNTING FINGERS

## 2020-01-30 ASSESSMENT — SLIT LAMP EXAM - LIDS
COMMENTS: NORMAL
COMMENTS: NORMAL

## 2020-01-30 NOTE — PROGRESS NOTES
1)POAG -- s/p SLT OU (OD:1/17 and OS:3/17), H/O Hypotension in past that is now in normal range, H/O Bradycardia and Migraines in past, first told about cupping in 20's -- K pachy: 557/536   Tmax:(28/30 in 11/13) but usu [id number] per old notes     HVF: OD:Superior arcuate and OS: Full (Full OU in 2010 with OD:superior arcuate developing in 2014)  CDR: 0.9/0.8    HRT/OCT: OD:Mild RNFl thinning and OS:RNFL WNL      FHX of Glc: No      Gonio: open      Intolerant to: AGN -- itchy eyes, Dorzolamide -- injection, T1/2 -- per old notes but never tried 2/2 concern over heartrate, Latanoprost -- injection/itchy (could re-try if necessary), Azopt -- blurry       Asthma/COPD: No   Steroid Use: No     Kidney Stones: No    Sulfa Allergy:  No    IOP targets:?M-Hteens (OD:Mteens and OS:Hteens) -- IOP good   2)MGD/HOANG s/p punctal plugs by Dr. 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  Patient will discontinue and hold onto Betaxolol 2x/day in BOTH EYES and  Rhopressa 1x/day in the RIGHT EYE ONLY.  Patient will return to clinic in 2-3 months with repeat IOP check and visual field test.       Attending Physician Attestation:  Complete documentation of historical and exam elements from today's encounter can be found in the full encounter summary report (not reduplicated in this progress note). I personally obtained the chief complaint(s) and history of present illness.  I confirmed  and edited as necessary the review of systems, past medical/surgical history, family history, social history, and examination findings as documented by others; and I examined the patient myself. I personally reviewed the relevant tests, images, and reports as documented above. I formulated and edited as necessary the assessment and plan and discussed the findings and management plan with the patient and family.  - Kitty Sheffield MD

## 2020-01-30 NOTE — PATIENT INSTRUCTIONS
Patient will continue on Zioptan at bedtime in BOTH EYES and start Preservative Free Cosopt (Timolol/Dorzolamide) 2x/day (12 hours apart) in BOTH EYES.  Patient will discontinue and hold onto Betaxolol 2x/day in BOTH EYES and  Rhopressa 1x/day in the RIGHT EYE ONLY.  Patient will return to clinic in 2-3 months with repeat IOP check and visual field test.

## 2020-01-30 NOTE — NURSING NOTE
Chief Complaints and History of Present Illnesses   Patient presents with     Glaucoma Follow-Up     Chief Complaint(s) and History of Present Illness(es)     Glaucoma Follow-Up     Laterality: right eye    Quality: hazy and fluctuating    Associated symptoms: itching and tearing.  Negative for floaters and flashes    Compliance with Treatment: always              Comments     The patient presents for a glaucoma follow up.  The patient notes her right eye hazy vision, itching and tearing.  Maryanne Zamora, COA, COA 9:57 AM 01/30/2020

## 2020-02-04 LAB
BKR LAB AP ABNORMAL BLEEDING: NO
BKR LAB AP BIRTH CONTROL/HORMONES: NORMAL
BKR LAB AP CERVICAL APPEARANCE: NORMAL
BKR LAB AP GYN ADEQUACY: NORMAL
BKR LAB AP GYN INTERPRETATION: NORMAL
BKR LAB AP HPV REFLEX: NORMAL
BKR LAB AP LMP: NORMAL
BKR LAB AP PATIENT STATUS: NO
BKR LAB AP PREVIOUS ABNORMAL: NORMAL
BKR LAB AP PREVIOUS NORMAL: 2016
HIGH RISK?: NO
PATH REPORT.COMMENTS IMP SPEC: NORMAL
RESULT FLAG (HE HISTORICAL CONVERSION): NORMAL

## 2020-02-13 ENCOUNTER — OFFICE VISIT (OUTPATIENT)
Dept: OPHTHALMOLOGY | Facility: CLINIC | Age: 65
End: 2020-02-13
Attending: OPHTHALMOLOGY
Payer: MEDICARE

## 2020-02-13 DIAGNOSIS — H02.883 MEIBOMIAN GLAND DYSFUNCTION (MGD) OF BOTH EYES: ICD-10-CM

## 2020-02-13 DIAGNOSIS — H02.886 MEIBOMIAN GLAND DYSFUNCTION (MGD) OF BOTH EYES: ICD-10-CM

## 2020-02-13 DIAGNOSIS — H04.123 DRY EYE SYNDROME OF BOTH EYES: Primary | ICD-10-CM

## 2020-02-13 DIAGNOSIS — H40.1132 PRIMARY OPEN ANGLE GLAUCOMA OF BOTH EYES, MODERATE STAGE: ICD-10-CM

## 2020-02-13 DIAGNOSIS — H25.13 NUCLEAR SCLEROSIS OF BOTH EYES: ICD-10-CM

## 2020-02-13 PROCEDURE — G0463 HOSPITAL OUTPT CLINIC VISIT: HCPCS | Mod: ZF

## 2020-02-13 ASSESSMENT — REFRACTION_WEARINGRX
OS_AXIS: 021
OS_ADD: +1.50
OD_AXIS: 164
OS_CYLINDER: +0.75
OD_CYLINDER: +0.75
SPECS_TYPE: PAL
OD_SPHERE: -2.00
OD_ADD: +1.50
OS_SPHERE: -0.50

## 2020-02-13 ASSESSMENT — VISUAL ACUITY
OD_CC: 20/20
OS_CC: 20/20
CORRECTION_TYPE: GLASSES
METHOD: SNELLEN - LINEAR

## 2020-02-13 ASSESSMENT — CUP TO DISC RATIO
OD_RATIO: 0.9
OS_RATIO: 0.8

## 2020-02-13 ASSESSMENT — CONF VISUAL FIELD
OS_NORMAL: 1
OD_NORMAL: 1

## 2020-02-13 ASSESSMENT — TONOMETRY
OS_IOP_MMHG: 19
IOP_METHOD: ICARE
OD_IOP_MMHG: 16

## 2020-02-13 NOTE — PROGRESS NOTES
CC: Irritation/tearing    HPI:  Katty Patton is a 65 year old female referred by Dr. Sheffield for evaluation of corneal irritation, dry eyes and MGD. Patient has history of POAG and multiple allergies to topical glaucoma medications (noted below). States currently doing well with Zioptan and preservative free Cosopt. Uses Acuvue daily CL rarely for active sports. Not currently using artificial tears regularly. Had punctal plugs placed in the past with Dr. Harkins. Notes more frequent tearing in the past two months. Had irritation with Rhopressa use but this was stopped by Dr. Sheffield.     is retired Neuro-radiologist    POHx:  Last eye exam: 1/30/20 (Dr. Sheffield)  Prior eye surgery/laser: SLT OU  CTL wearer: Rare - uses for active sports only  Glasses: Yes    Fam hx of eye disease: None    Gtts:  Zioptan QHS OU  Pres Free Cosopt BID OU  Rare artificial tear use    All:  ?TRIP allergy, Alphagan, Dorzolamide, Latanoprost, peanuts    A/P:    1. Dry Eyes/MGD/Blepharitis OU   - Eyelids contributing to symptoms   - Start hot compresses QID   - Start lid scrubs 1-2x/day   - Currently has LL punctal plugs in place   - Artificial tears - celluvisc or systane ultra 6 times a day   - Agree with avoiding TRIP and other preservatives if possible   - Okay to use CL intermittently    2. POAG OU   - Hx of SLT OU   - Currently on Zioptan QHS and Pres-free Cosopt BID in both eyes   - IOP good today   - Follows with Dr. Sheffield    3. Cataracts OU   - Monitor for now    Follow up: 1-2 months for surface check - same day as glaucoma visit if possible  --  Cesar Zimmer MD  Ophthalmology Resident, PGY-3    Attending Physician Attestation:  Complete documentation of historical and exam elements from today's encounter can be found in the full encounter summary report (not reduplicated in this progress note).  I personally obtained the chief complaint(s) and history of present illness.  I confirmed and edited as necessary the review  of systems, past medical/surgical history, family history, social history, and examination findings as documented by others; and I examined the patient myself.  I personally reviewed the relevant tests, images, and reports as documented above.  I formulated and edited as necessary the assessment and plan and discussed the findings and management plan with the patient and family. - Jewel Parker MD

## 2020-02-13 NOTE — NURSING NOTE
Chief Complaints and History of Present Illnesses   Patient presents with     Dry Eye(s)     Chief Complaint(s) and History of Present Illness(es)     Dry Eye(s)     Laterality: both eyes    Associated signs and symptoms: irritation and tearing              Comments     Pt. States that has been had excessive tearing RE for the last 2 months. BE are very dry and irritated.   Kiley Priest COT 9:34 AM February 13, 2020

## 2020-02-13 NOTE — PATIENT INSTRUCTIONS
Start hot compresses four times a day (5 minutes each time)    Start eyelid scrubs with baby shampoo 1-2 times per day    Preservative free artificial tears (Celluvisc, systane ultra or refresh) 6 times per day

## 2020-03-11 ENCOUNTER — HEALTH MAINTENANCE LETTER (OUTPATIENT)
Age: 65
End: 2020-03-11

## 2020-03-16 ENCOUNTER — COMMUNICATION - HEALTHEAST (OUTPATIENT)
Dept: INTERNAL MEDICINE | Facility: CLINIC | Age: 65
End: 2020-03-16

## 2020-03-16 DIAGNOSIS — N95.2 POSTMENOPAUSAL ATROPHIC VAGINITIS: ICD-10-CM

## 2020-03-21 ENCOUNTER — COMMUNICATION - HEALTHEAST (OUTPATIENT)
Dept: INTERNAL MEDICINE | Facility: CLINIC | Age: 65
End: 2020-03-21

## 2020-03-21 DIAGNOSIS — F41.9 ANXIETY: ICD-10-CM

## 2020-03-21 DIAGNOSIS — I73.00 RAYNAUD'S DISEASE WITHOUT GANGRENE: ICD-10-CM

## 2020-03-31 ENCOUNTER — TELEPHONE (OUTPATIENT)
Dept: OPHTHALMOLOGY | Facility: CLINIC | Age: 65
End: 2020-03-31

## 2020-03-31 NOTE — TELEPHONE ENCOUNTER
LVM on cell phone letting pt know 4/9 appt has been cancelled due to COVID 19 virus and to call back to reschedule.    COVID-19 RESCHEDULES     Needed:  No    Date contacted: March 31, 2020 10:47 AM    Type of contact: Left message on cell phone    Attending provider: Jewel Parker MD    Current Eye Symptoms: NA    Refills needed: NA    Best contact for rescheduling: Mobile    Best date/time for rescheduling: July/August    COVID19 warnings given about screening and visitors:   No due to cancelling appt    Pirti Donis OT 10:47 AM March 31, 2020

## 2020-04-02 ENCOUNTER — TELEPHONE (OUTPATIENT)
Dept: OPHTHALMOLOGY | Facility: CLINIC | Age: 65
End: 2020-04-02

## 2020-04-02 NOTE — TELEPHONE ENCOUNTER
COVID-19 RESCHEDULES    Date contacted: April 2, 2020 12:14 PM    Type of contact: Spoke with patient     Current appointment date: 04/09/2020    Attending provider: Nettie    Current Eye Symptoms: na    Refills needed: na    Best contact for rescheduling: na    Best date/time for rescheduling: LARISA Liriano12:14 PM April 2, 2020

## 2020-09-15 ENCOUNTER — COMMUNICATION - HEALTHEAST (OUTPATIENT)
Dept: INTERNAL MEDICINE | Facility: CLINIC | Age: 65
End: 2020-09-15

## 2021-01-03 ENCOUNTER — HEALTH MAINTENANCE LETTER (OUTPATIENT)
Age: 66
End: 2021-01-03

## 2021-01-07 ENCOUNTER — COMMUNICATION - HEALTHEAST (OUTPATIENT)
Dept: INTERNAL MEDICINE | Facility: CLINIC | Age: 66
End: 2021-01-07

## 2021-01-07 DIAGNOSIS — N95.2 POSTMENOPAUSAL ATROPHIC VAGINITIS: ICD-10-CM

## 2021-01-20 ENCOUNTER — COMMUNICATION - HEALTHEAST (OUTPATIENT)
Dept: INTERNAL MEDICINE | Facility: CLINIC | Age: 66
End: 2021-01-20

## 2021-04-14 ENCOUNTER — COMMUNICATION - HEALTHEAST (OUTPATIENT)
Dept: INTERNAL MEDICINE | Facility: CLINIC | Age: 66
End: 2021-04-14

## 2021-04-14 DIAGNOSIS — R59.1 LYMPHADENOPATHY: ICD-10-CM

## 2021-04-15 ENCOUNTER — AMBULATORY - HEALTHEAST (OUTPATIENT)
Dept: SCHEDULING | Facility: CLINIC | Age: 66
End: 2021-04-15

## 2021-04-15 DIAGNOSIS — R59.1 LYMPHADENOPATHY: ICD-10-CM

## 2021-04-25 ENCOUNTER — HEALTH MAINTENANCE LETTER (OUTPATIENT)
Age: 66
End: 2021-04-25

## 2021-05-03 ENCOUNTER — RECORDS - HEALTHEAST (OUTPATIENT)
Dept: ADMINISTRATIVE | Facility: OTHER | Age: 66
End: 2021-05-03

## 2021-05-10 ENCOUNTER — HOSPITAL ENCOUNTER (OUTPATIENT)
Dept: MAMMOGRAPHY | Facility: CLINIC | Age: 66
Discharge: HOME | End: 2021-05-10
Attending: INTERNAL MEDICINE
Payer: COMMERCIAL

## 2021-05-10 DIAGNOSIS — Z12.31 VISIT FOR SCREENING MAMMOGRAM: ICD-10-CM

## 2021-05-24 ENCOUNTER — RECORDS - HEALTHEAST (OUTPATIENT)
Dept: ADMINISTRATIVE | Facility: CLINIC | Age: 66
End: 2021-05-24

## 2021-05-25 ENCOUNTER — RECORDS - HEALTHEAST (OUTPATIENT)
Dept: ADMINISTRATIVE | Facility: CLINIC | Age: 66
End: 2021-05-25

## 2021-05-27 ENCOUNTER — RECORDS - HEALTHEAST (OUTPATIENT)
Dept: ADMINISTRATIVE | Facility: CLINIC | Age: 66
End: 2021-05-27

## 2021-05-28 NOTE — TELEPHONE ENCOUNTER
Who is calling:  Patient.  Reason for Call:  Patient called her insurance and they told her the bone density, 3D mammogram, and shingle shot would all be covered if the provider codes it as being medically necessary.  Please call patient when this is ordered.   Date of last appointment with primary care: 1/29/2019  Okay to leave a detailed message: Yes

## 2021-05-28 NOTE — TELEPHONE ENCOUNTER
Spoke with pt to further clarify. Pt states she spoke with her insurance company, and a 3D mammogram is covered, pt would like a new order for a 3D mammo.  Pt also states that a DXA scan is not covered until she is 65 unless PCP deems her of higher risk.  Asking for PCP to order a DXA scan this way for her.

## 2021-05-28 NOTE — TELEPHONE ENCOUNTER
Who is calling:  Patient   Reason for Call:  Patient has questions regarding the referral for the bilateral mammogram and the Dexa scan. Stating these are scheduled tomorrow and I may need to cancel them if I do not get a response prior. Patient has billing concerns and insurance coverage questions.   Date of last appointment with primary care: 2/22/19  Okay to leave a detailed message: Yes

## 2021-05-29 NOTE — TELEPHONE ENCOUNTER
Refill Approved    Rx renewed per Medication Renewal Policy. Medication was last renewed on 1/29/19.    Yasemin Jackman, [facility] Triage/Med Refill 6/4/2019     Requested Prescriptions   Pending Prescriptions Disp Refills     estradiol (ESTRACE) 0.01 % (0.1 mg/gram) vaginal cream 42.5 g 0     Sig: INSERT 2 GRAMS BY VAGINAL ROUTE 3 TIMES PER WEEK       Hormone Replacement Therapy Refill Protocol Passed - 6/4/2019  9:01 AM        Passed - PCP or prescribing provider visit in past 12 months       Last office visit with prescriber/PCP: 1/29/2019 Radha Spence MD OR same dept: 1/29/2019 Radha Spence MD OR same specialty: 1/29/2019 Radha Spence MD  Last physical: Visit date not found Last MTM visit: Visit date not found     Next visit within 3 mo: Visit date not found  Next physical within 3 mo: Visit date not found  Prescriber OR PCP: Radha Spence MD  Last diagnosis associated with med order: 1. Menopause  - estradiol (ESTRACE) 0.01 % (0.1 mg/gram) vaginal cream; INSERT 2 GRAMS BY VAGINAL ROUTE 3 TIMES PER WEEK  Dispense: 42.5 g; Refill: 0     If protocol passes may refill for 3 months.

## 2021-05-29 NOTE — TELEPHONE ENCOUNTER
FYI - Status Update  Who is Calling: Patient  Update: Questioning if refill can be processed today. Patient states she will be going out of town, and will need the medication prior to leaving.  Okay to leave a detailed message?:  No return call needed

## 2021-05-30 NOTE — PROGRESS NOTES
[facility] Follow Up Note    Assessment/Plan:  1. Diarrhea of presumed infectious origin  2-1/2-week history of non-bloody diarrhea with unremarkable abdominal exam.  Travel to [place].  Symptoms began on day 6.  No real improvement seen.  Recommendation: We will proceed with a azithromycin-1000 mg once.  If no improvement after 48 hours, may take 500 mg daily x3.  May use Imodium if no blood or abdominal pain.  If no improvement, will need to collect stools.  - azithromycin (ZITHROMAX) 200 mg/5 mL suspension; Take 25ml once.  If no improvement, take 500mg or 12.5 ml q day times 3 days  Dispense: 75 mL; Refill: 0  - Ova and Parasite, Stool; Future  - Culture, Stool; Future    2. Anxiety  Medications renewed  - ALPRAZolam (XANAX) 0.25 MG tablet; Take 1 tablet (0.25 mg total) by mouth daily as needed for anxiety.  Dispense: 30 tablet; Refill: 5    3. Menopause  Medications renewed  - ESTRACE 0.01 % (0.1 mg/gram) vaginal cream; INSERT 2 GRAMS BY VAGINAL ROUTE 3 TIMES PER WEEK  Dispense: 42.5 g; Refill: 5      Radha Spence MD    Chief Complaint:  Chief Complaint   Patient presents with     Diarrhea       History of Present Illness:  Katty is a 64 y.o. female who is here today, accompanied by her  Dr. Mackay.  They were traveling in [place].  On day 6 of their trip, she did developed a diarrheal illness.  She began with 3-5 BMs per day.  They were liquid.  No blood was noted.  There was urgency and cramping but no persistent abdominal pain.  She denies fever or chills.  They did drink water from the tap in their hotel.  They did eat a great deal of seafood.  No one else was ill in their travel group.  Since read turned a couple of days ago, her symptoms have persisted.  She continues to have the same amount of loose liquidy stools.  She is eating and drinking fairly normally.  She has lost 2 to 3 pounds.  Of note, she does not eat nuts, drink coffee, but has continued to eat chocolate and  "ice cream.    Palliative measures include Lomotil and Pepto-Bismol.    Review of Systems:  A comprehensive review of systems was performed and was otherwise negative    PFSH:  Social History: She is  with 1 adult child.  Social History     Tobacco Use   Smoking Status Never Smoker   Smokeless Tobacco Never Used       Past History: Unremarkable.  She has had eosinophilic esophagitis  Current Outpatient Medications   Medication Sig Dispense Refill     ALPRAZolam (XANAX) 0.25 MG tablet Take 1 tablet (0.25 mg total) by mouth daily as needed for anxiety. 30 tablet 5     betaxolol (BETOPTIC-S) 0.5 % ophthalmic suspension 2 (two) times a day.  11     budesonide (PULMICORT) 1 mg/2 mL nebulizer solution 1 mg daily.   1     cholecalciferol, vitamin D3, 1,000 unit tablet Take 1,800 Units by mouth daily.       ESTRACE 0.01 % (0.1 mg/gram) vaginal cream INSERT 2 GRAMS BY VAGINAL ROUTE 3 TIMES PER WEEK 42.5 g 5     multivitamin therapeutic (THERAGRAN) tablet Take 1 tablet by mouth daily.       OMEGA-3/DHA/EPA/FISH OIL (FISH OIL-OMEGA-3 FATTY ACIDS) 300-1,000 mg capsule Take 2 g by mouth daily.       omeprazole (PRILOSEC) 20 MG capsule        ZIOPTAN, PF, 0.0015 % Dpet ophthalmic dropperette   11     azithromycin (ZITHROMAX) 200 mg/5 mL suspension Take 25ml once.  If no improvement, take 500mg or 12.5 ml q day times 3 days 75 mL 0     No current facility-administered medications for this visit.        Family History: No other family members with a similar illness    Physical Exam:  General Appearance:   He appears well and in no acute distress  Vitals:    07/05/19 0811   BP: 128/66   Patient Site: Left Arm   Patient Position: Sitting   Cuff Size: Adult Regular   Pulse: 60   Weight: 119 lb (54 kg)   Height: 5' 7\" (1.702 m)     Wt Readings from Last 3 Encounters:   07/05/19 119 lb (54 kg)   01/29/19 119 lb 11.2 oz (54.3 kg)   12/07/16 124 lb (56.2 kg)     Body mass index is 18.64 kg/m .    Lungs are clear to auscultation and " percussion with no wheezing  Cardiac exam reveals regular rate and rhythm with no murmurs or gallops  Abdomen is soft and nontender

## 2021-05-31 NOTE — PROGRESS NOTES
[facility] Follow Up Note    Assessment/Plan:  1. Loose stools/incomplete resolution of traveler's diarrhea  Recommendations: Continue with probiotics.  Continue to avoid caffeine and chocolate.  Begin Pepto-Bismol 2 tablets 3 times daily for the next 5 to 7 days.  If no improvement, could repeat a azithromycin.  Of note, stool cultures were negative  - azithromycin (ZITHROMAX) 500 MG tablet; Take 1 tablet (500 mg total) by mouth daily for 3 days.  Dispense: 3 tablet; Refill: 0    2. Sacroiliac joint pain  Intermittent-no neurologic abnormalities on exam.  Sometimes severe.  Recommendation: We will x-ray pelvis and SI joint.  - XR Pelvis W 2 Vw Hips Bilateral; Future  - XR Sacroliliac Joints 1 Or 2 VWS; Future  - Ambulatory referral to Physical Therapy      Follow-up for complete physical in the next 3 to 6 months    Radha Spence MD    Chief Complaint:  Chief Complaint   Patient presents with     Medication Management     CSA already signed & Pt takes once in awhile      Follow-up       History of Present Illness:  Katty is a 64 y.o. female who is here today for follow-up with regard to traveler's diarrhea and back pain.  Of note, she was seen here in July following a trip to [place].  The details of the preceding note are outlined in depth.    . Diarrhea of presumed infectious origin  2-1/2-week history of non-bloody diarrhea with unremarkable abdominal exam.  Travel to [place].  Symptoms began on day 6.  No real improvement seen.  Recommendation: We will proceed with a azithromycin-1000 mg once.  If no improvement after 48 hours, may take 500 mg daily x3.  May use Imodium if no blood or abdominal pain.  If no improvement, will need to collect stools.  - azithromycin (ZITHROMAX) 200 mg/5 mL suspension; Take 25ml once.  If no improvement, take 500mg or 12.5 ml q day times 3 days  Dispense: 75 mL; Refill: 0  - Ova and Parasite, Stool; Future  - Culture, Stool; Future    Since that time, she  has completed the azithromycin suspension with improvement of symptoms.  However, shortly after, she began to develop loose soft stools again.  She had some bloating with this as well but no weight loss.  She did complete the stool studies which were negative.  She denies any abdominal pain.  She does have gas.  She does state that she is taking in yogurt and eating and drinking regularly.  She does not note any blood or abdominal pain that is persistent.    Additionally, she contact me via the portal with complaints of a left lower back pain.  It was quite intense upon awakening in the morning.  With stretching the back, it improves.  Is not associated necessarily with any sciatica or any bowel or bladder incontinence issues.       Review of Systems:  A comprehensive review of systems was performed and was otherwise negative    PFSH:  Social History: She is  with 1 adult son.  She is a non-smoker.  She does not drink alcohol.  She is very physically active.  Social History     Tobacco Use   Smoking Status Never Smoker   Smokeless Tobacco Never Used       Past History: She has eosinophilic esophagitis  Current Outpatient Medications   Medication Sig Dispense Refill     ALPRAZolam (XANAX) 0.25 MG tablet Take 1 tablet (0.25 mg total) by mouth daily as needed for anxiety. 30 tablet 5     betaxolol (BETOPTIC-S) 0.5 % ophthalmic suspension 2 (two) times a day.  11     budesonide (PULMICORT) 1 mg/2 mL nebulizer solution 1 mg daily.   1     cholecalciferol, vitamin D3, 1,000 unit tablet Take 1,800 Units by mouth once a week.              ESTRACE 0.01 % (0.1 mg/gram) vaginal cream INSERT 2 GRAMS BY VAGINAL ROUTE 3 TIMES PER WEEK (Patient taking differently: as needed. INSERT 2 GRAMS BY VAGINAL ROUTE 3 TIMES PER WEEK      ) 42.5 g 5     multivitamin therapeutic (THERAGRAN) tablet Take 1 tablet by mouth daily.       OMEGA-3/DHA/EPA/FISH OIL (FISH OIL-OMEGA-3 FATTY ACIDS) 300-1,000 mg capsule Take 2 g by mouth daily.    "    omeprazole (PRILOSEC) 20 MG capsule        ZIOPTAN, PF, 0.0015 % Dpet ophthalmic dropperette   11     azithromycin (ZITHROMAX) 500 MG tablet Take 1 tablet (500 mg total) by mouth daily for 3 days. 3 tablet 0     No current facility-administered medications for this visit.        Family History: Nothing new.  No family members have similar malady    Physical Exam:  General Appearance:   She appears well and in no acute distress  Vitals:    08/06/19 0750   BP: 132/64   Patient Site: Left Arm   Patient Position: Sitting   Cuff Size: Adult Regular   Pulse: 66   SpO2: 99%   Weight: 118 lb 9.6 oz (53.8 kg)   Height: 5' 7\" (1.702 m)     Wt Readings from Last 3 Encounters:   08/06/19 118 lb 9.6 oz (53.8 kg)   07/05/19 119 lb (54 kg)   01/29/19 119 lb 11.2 oz (54.3 kg)     Body mass index is 18.58 kg/m .    Back is examined.  Some SI joint tenderness to palpation.  Straight leg raising is negative.  Muscle strength is symmetric.  No areflexia is noted      "

## 2021-06-01 ENCOUNTER — RECORDS - HEALTHEAST (OUTPATIENT)
Dept: ADMINISTRATIVE | Facility: CLINIC | Age: 66
End: 2021-06-01

## 2021-06-02 ENCOUNTER — RECORDS - HEALTHEAST (OUTPATIENT)
Dept: ADMINISTRATIVE | Facility: CLINIC | Age: 66
End: 2021-06-02

## 2021-06-02 VITALS — WEIGHT: 119.7 LBS | HEIGHT: 67 IN | BODY MASS INDEX: 18.79 KG/M2

## 2021-06-02 NOTE — TELEPHONE ENCOUNTER
New Appointment Needed  What is the reason for the visit:    Same Date/Next Day Appt Request  What is the reason for your visit?:  Physical ( address other concerns, patient was not specific)    Provider Preference: PCP only  How soon do you need to be seen?:  01/03/19 midmorning - patient will be unavailable for most of January ,- Please discuss options with patient.  Waitlist offered?: Yes- declined   Okay to leave a detailed message:  Yes

## 2021-06-02 NOTE — TELEPHONE ENCOUNTER
Pt will be turning 65 in February and asking to be added on in January for a PX1, before Medicare    please call back  [phone] SERGEI

## 2021-06-03 VITALS — WEIGHT: 119 LBS | BODY MASS INDEX: 18.68 KG/M2 | HEIGHT: 67 IN

## 2021-06-03 VITALS — BODY MASS INDEX: 18.62 KG/M2 | WEIGHT: 118.6 LBS | HEIGHT: 67 IN

## 2021-06-04 VITALS
HEIGHT: 67 IN | DIASTOLIC BLOOD PRESSURE: 68 MMHG | SYSTOLIC BLOOD PRESSURE: 136 MMHG | OXYGEN SATURATION: 98 % | HEART RATE: 50 BPM | WEIGHT: 117.2 LBS | BODY MASS INDEX: 18.39 KG/M2

## 2021-06-04 NOTE — TELEPHONE ENCOUNTER
Medication Question or Clarification    Who is calling: Pharmacy: [phone]     What medication are you calling about? (include dose and sig)   ESTRACE 0.01 % (0.1 mg/gram) vaginal cream 42.5 g 12 12/26/2019     Sig - Route: Insert 2 g into the vagina as needed. INSERT 2 GRAMS BY VAGINAL ROUTE 3 TIMES PER WEEK         Who prescribed the medication?: PCP    What is your question/concern?: Alternative requested: Yuvafem preferred by Ins. Please send alternative or proceed with PA.    Pharmacy: [facility] [id number] [facility] [employer] - [address]    Okay to leave a detailed message?: Yes  Site CMT - Please call the pharmacy to obtain any additional needed information.

## 2021-06-04 NOTE — TELEPHONE ENCOUNTER
These are 2 different preparations, however appropriate.  We will switch from vaginal cream to 10 mcg insert, with the same directions.

## 2021-06-05 NOTE — PROGRESS NOTES
Assessment and Plan:     1. Encounter for preventive care  Up-to-date on ophthalmologic, dental and Derm care.  Immunizations reviewed.  She is due for shingles vaccine but will check with insurance.  She is also due for Prevnar 13.  Mammogram due in the spring.  Pap smear will be updated today.  Colonoscopy up-to-date.  She is exercising regularly.  She eats a healthful diet maintains a healthy weight.    - HM2(CBC w/o Differential)  - Comprehensive Metabolic Panel  - Vitamin D, Total (25-Hydroxy)  - Thyroid Cascade  - Lipid Garden FASTING  - Gynecologic Cytology (PAP Smear)  - HIV Antigen/Antibody Screening Cascade  - Hepatitis C Antibody (Anti-HCV)    2. Other decreased white blood cell (WBC) count  Low white count noted on hemogram.  Will update lab  - HM1(CBC and Differential)    3. Premature atrial complexes  With underlying bradycardia.  No new complaints.  We will continue to monitor    4. Raynaud's disease without gangrene  We will increase amlodipine to 5 mg  - amLODIPine (NORVASC) 5 MG tablet; Take 1 tablet (5 mg total) by mouth daily.  Dispense: 90 tablet; Refill: 3      Follow-up annually      Radha Spence MD  1/28/2020    Chief Complaint:  Chief Complaint   Patient presents with     Annual Exam     Medication Management     CSA Signed & Utox pended        History of Present Illness:  Katty is a 64 y.o. female who is here today for an annual physical examination.  Of note, in general she enjoys good health.  She has no major complaints.  She states that she and her  continue to be very active and healthy.  She does note that her ray nodes is not being adequately treated with the low-dose amlodipine.  She went scuba diving and snorkeling, she had significant ray nodes with being in the ocean.    Health maintenance is reviewed and updated in the chart    Review of Systems:    The rest of the review of systems are negative for all systems.    PFSH:  Social History: She is .  Her  "only son is finishing a chief residency at [employer].  He will be doing a GI fellowship at the [employer].  Social History     Tobacco Use   Smoking Status Never Smoker   Smokeless Tobacco Never Used       Past Medical History:   Past Medical History:   Diagnosis Date     Bradycardia 8/14/2015    Asymptomatic Possibly related to vagal tone     Esophageal rupture      Hypertension     new no meds     Pre-syncope 8/14/2015    Occasional episodes in her teens and as an adult       Allergies   Allergen Reactions     Brimonidine      Dorzolamide      Latanoprost      Peanut Other (See Comments)       Family History: Nothing new  Family History   Problem Relation Age of Onset     Breast cancer Maternal Aunt 62     Dementia Maternal Aunt      Coronary artery disease Mother 61     Diabetes type II Mother      Sudden death Father      No Medical Problems Sister      Hypertension Brother      No Medical Problems Brother      No Medical Problems Brother      No Medical Problems Brother      No Medical Problems Brother        Physical Exam:  Vitals:    01/28/20 1326   BP: 136/68   Patient Site: Left Arm   Patient Position: Sitting   Cuff Size: Adult Regular   Pulse: (!) 50   SpO2: 98%   Weight: 117 lb 3.2 oz (53.2 kg)   Height: 5' 7\" (1.702 m)     Wt Readings from Last 3 Encounters:   01/28/20 117 lb 3.2 oz (53.2 kg)   08/06/19 118 lb 9.6 oz (53.8 kg)   07/05/19 119 lb (54 kg)       General Appearance:  Alert, cooperative, no distress, appears stated age   Head:  Normocephalic, without obvious abnormality, atraumatic   Eyes:  PERRL, conjunctiva/corneas clear, EOM's intact   Ears:  Normal TM's and external ear canals, both ears   Nose: Nares normal, septum midline,mucosa normal, no drainage    Throat: Lips, mucosa, and tongue normal; teeth and gums normal   Neck: Supple, symmetrical, trachea midline, no adenopathy;  thyroid: not enlarged, symmetric, no tenderness/mass/nodules; no carotid bruit or JVD   Back:   " Symmetric, no curvature, ROM normal, no CVA tenderness   Lungs:   Clear to auscultation bilaterally, respirations unlabored   Breasts:  No breast masses, tenderness, asymmetry, or nipple discharge.   Heart:  Regular rate and rhythm, S1 and S2 normal, no murmur, rub, or gallop   Abdomen:   Soft, non-tender, bowel sounds active all four quadrants,  no masses, no organomegaly   Genitalia: Normally developed genitalia with no external lesions or eruptions.  Vagina and cervix show no lesions, inflammation, discharge or tenderness.  No cystocele.  Uterus normal size and position.  No adnexal mass or tenderness.  Significant vaginal atrophy noted.   Rectal:  No hemorrhoids, noted externally   Extremities: Extremities normal, atraumatic, no cyanosis or edema   Skin: Skin color, texture, turgor normal, no rashes or lesions   Lymph nodes: Cervical, supraclavicular, and axillary nodes normal   Neurologic: Normal, CN 2-12 intact     Medications:  Current Outpatient Medications   Medication Sig Dispense Refill     ALPRAZolam (XANAX) 0.25 MG tablet Take 1 tablet (0.25 mg total) by mouth daily as needed for anxiety. 30 tablet 5     amLODIPine (NORVASC) 5 MG tablet Take 1 tablet (5 mg total) by mouth daily. 90 tablet 3     betaxolol (BETOPTIC-S) 0.5 % ophthalmic suspension 2 (two) times a day.  11     budesonide (PULMICORT) 1 mg/2 mL nebulizer solution 1 mg daily.   1     cholecalciferol, vitamin D3, 1,000 unit tablet Take 1,800 Units by mouth once a week.              estradiol (YUVAFEM) 10 mcg vaginal tablet Insert 1 tablet (10 mcg total) into the vagina 3 (three) times a week. 12 tablet 11     multivitamin therapeutic (THERAGRAN) tablet Take 1 tablet by mouth daily.       netarsudil mesylate (RHOPRESSA OPHT) Apply to eye.       OMEGA-3/DHA/EPA/FISH OIL (FISH OIL-OMEGA-3 FATTY ACIDS) 300-1,000 mg capsule Take 2 g by mouth daily.       omeprazole (PRILOSEC) 20 MG capsule        ZIOPTAN, PF, 0.0015 % Dpet ophthalmic dropperette    11     No current facility-administered medications for this visit.        Immunizations:  Immunization History   Administered Date(s) Administered     Hep A, historic 01/13/2006, 12/13/2006     Influenza S3m6-05, 01/05/2010     Influenza, inj, historic,unspecified 09/18/2009, 10/14/2010, 09/12/2012, 10/24/2016     Influenza,inj,MDCK,PF,Quad >4yrs 10/17/2018     Influenza,seasonal quad, PF, =/> 6months 10/27/2019     Td, adult adsorbed, PF 10/31/2016     Td,adult,historic,unspecified 01/21/1997, 01/13/2006

## 2021-06-06 NOTE — TELEPHONE ENCOUNTER
Medication Question or Clarification  Who is calling: Patient  What medication are you calling about (include dose and sig)?: estradiol (YUVAFEM) 10 mcg vaginal tablet  Who prescribed the medication?: Radha Spence MD  What is your question/concern?: Pt requesting a tube instead of tablet.  Please advise.  Requested Pharmacy: [facility]  Okay to leave a detailed message?: No

## 2021-06-14 NOTE — TELEPHONE ENCOUNTER
Refill Approved    Rx renewed per Medication Renewal Policy. Medication was last renewed on 3/16/20, last OV 1/28/20.    Marline John, [facility] Triage/Med Refill 1/8/2021     Requested Prescriptions   Pending Prescriptions Disp Refills     estradioL (ESTRACE) 0.01 % (0.1 mg/gram) vaginal cream [Pharmacy Med Name: ESTRADIOL 0.01% CREAM] 42.5 g 1     Sig: INSERT ONE-HALF TO ONE GRAM INTO THE VAGINA THREE TIMES WEEKLY       Hormone Replacement Therapy Refill Protocol Passed - 1/7/2021 11:27 AM        Passed - PCP or prescribing provider visit in past 12 months       Last office visit with prescriber/PCP: 8/6/2019 Radha Spence MD OR same dept: Visit date not found OR same specialty: 8/6/2019 Radha Spence MD  Last physical: 1/28/2020 Last MTM visit: Visit date not found     Next visit within 3 mo: Visit date not found  Next physical within 3 mo: Visit date not found  Prescriber OR PCP: Radha Spence MD  Last diagnosis associated with med order: 1. Postmenopausal atrophic vaginitis  - estradioL (ESTRACE) 0.01 % (0.1 mg/gram) vaginal cream [Pharmacy Med Name: ESTRADIOL 0.01% CREAM]; INSERT ONE-HALF TO ONE GRAM INTO THE VAGINA THREE TIMES WEEKLY  Dispense: 42.5 g; Refill: 1     If protocol passes may refill for 3 months.

## 2021-06-16 NOTE — TELEPHONE ENCOUNTER
Telephone Encounter by Joselin Peguero CMA at 3/23/2020 11:05 AM     Author: Joselin Peguero CMA Service: -- Author Type: Certified Medical Assistant    Filed: 3/23/2020 11:07 AM Encounter Date: 3/21/2020 Status: Signed    : Joselin Peguero CMA (Certified Medical Assistant)       Medication: Alprazolam   Last Date Filled 7/5/19   pulled: YES    Only PCP Prescribing? : YES  Taken as prescribed from physician notes? YES    CSA in last year: YES  Random Utox in last year: NO  (if any of the above answer NO - schedule with PCP)     Opioids + benzodiazepines? NO  (if the above answer YES - schedule with PCP every 6 months)     Is patient on the Executive Review Team? NO      All responses suggest: Refilling prescription      Return in about 1 year (around 1/28/2021) for Annual physical.

## 2021-06-18 NOTE — LETTER
Letter by Radha Spence MD at      Author: Radha Spence MD Service: -- Author Type: --    Filed:  Encounter Date: 1/29/2019 Status: (Other)         [facility] INTERNAL MEDICINE  01/29/19    Patient: Katty Patton  YOB: 1955  Medical Record Number: [id number]  CSN: [id number]                                                                              Non-opioid Controlled Substance Agreement    I understand that my care provider has prescribed a controlled substance to help manage my condition(s). I am taking this medicine to help me function or work. I know this is strong medicine, and that it can cause serious side effects. Controlled substances can be sedating, addicting and may cause a dependency on the drug. They can affect my ability to drive or think, and cause depression. They need to be taken exactly as prescribed. Combining controlled substances with certain medicines or chemicals (such as cocaine, sedatives and tranquilizers, sleeping pills, meth) can be dangerous or even fatal. Also, if I stop controlled substances suddenly, I may have severe withdrawal symptoms.  If not helpful, I may be asked to stop them.    The risks, benefits, and side effects of these medicine(s) were explained to me. I agree that:    1. I will take part in other treatments as advised by my care team. This may be psychiatry or counseling, physical therapy, behavioral therapy, group treatment or a referral to a pain clinic. I will reduce or stop my medicine when my care team tells me to do so.  2. I will take my medicines as prescribed. I will not change the dose or schedule unless my care team tells me to. There will be no refills if I run out early.  I may be contactedwithout warning and asked to complete a urine drug test or pill count at any time.   3. I will keep all my appointments, and understand this is part of the monitoring of controlled substances. My care team may require an office visit for EVERY  controlled substance refill. If I miss appointments or dont follow instructions, my care team may stop my medicine.  4. I will not ask other providers to prescribe controlled substances, and I will not accept controlled substances from other people. If I need another prescribed controlled substance for a new reason, I will tell my care team within 1 business day.  5. I will use one pharmacy to fill all of my controlled substance prescriptions, and it is up to me to make sure that I do not run out of my medicines on weekends or holidays. If my care team is willing to refill my controlled substance prescription without a visit, I must request refills only during office hours, refills may take up to 3 days to process, and it may take up to 5 to 7 days for my medicine to be mailed and ready at my pharmacy. Prescriptions will not be mailed anywhere except my pharmacy.    6. I am responsible for my prescriptions. If the medicine/prescription is lost or stolen, it will not be replaced. I also agree not to share controlled substance medicines with anyone.          [facility] INTERNAL MEDICINE  01/29/19  Patient:  Katty Patton  YOB: 1955  Medical Record Number: [id number]  CSN: [id number]    7. I agree to not use ANY illegal or recreational drugs. This includes marijuana, cocaine, bath salts or other drugs. I agree not to use alcohol unless my care team says I may. I agree to give urine samples whenever asked. If I dont give a urine sample, the care team may stop my medicine.    8. If I enroll in the [place] Medical Marijuana program, I will tell my care team. I will also sign an agreement to share my medical records with my care team.    9. I will bring in my list of medicines (or my medicine bottles) each time I come to the clinic.   10. I will tell my care team right away if I become pregnant or have a new medical problem treated outside of my regular clinic.  11. I understand that this medicine can affect my  thinking and judgment. It may be unsafe for me to drive, use machinery and do dangerous tasks. I will not do any of these things until I know how the medicine affects me. If my dose changes, I will wait to see how it affects me. I will contact my care team if I have concerns about medicine side effects.    I understand that if I do not follow any of the conditions above, my prescriptions or treatment may be stopped.      I agree that my provider, clinic care team, and pharmacy may work with any city, state or federal law enforcement agency that investigates the misuse, sale, or other diversion of my controlled medicine. I will allow my provider to discuss my care with or share a copy of this agreement with any other treating provider, pharmacy or emergency room where I receive care. I agree to give up (waive) any right of privacy or confidentiality with respect to these consents.   I have read this agreement and have asked questions about anything I did not understand.    ___________________________________________________________________________  Patient signature - Date/Time  -Katty Patton                                      ___________________________________________________________________________  Witness signature                                                                    ___________________________________________________________________________  Provider signature- Radha Spence MD

## 2021-06-20 NOTE — LETTER
Letter by Radha Spence MD at      Author: Radha Spence MD Service: -- Author Type: --    Filed:  Encounter Date: 1/28/2020 Status: (Other)         [facility] INTERNAL MEDICINE  01/28/20    Patient: Katty Patton  YOB: 1955  Medical Record Number: [id number]  CSN: [id number]                                                                              Non-opioid Controlled Substance Agreement    I understand that my care provider has prescribed a controlled substance to help manage my condition(s). I am taking this medicine to help me function or work. I know this is strong medicine, and that it can cause serious side effects. Controlled substances can be sedating, addicting and may cause a dependency on the drug. They can affect my ability to drive or think, and cause depression. They need to be taken exactly as prescribed. Combining controlled substances with certain medicines or chemicals (such as cocaine, sedatives and tranquilizers, sleeping pills, meth) can be dangerous or even fatal. Also, if I stop controlled substances suddenly, I may have severe withdrawal symptoms.  If not helpful, I may be asked to stop them.    The risks, benefits, and side effects of these medicine(s) were explained to me. I agree that:    1. I will take part in other treatments as advised by my care team. This may be psychiatry or counseling, physical therapy, behavioral therapy, group treatment or a referral to a pain clinic. I will reduce or stop my medicine when my care team tells me to do so.  2. I will take my medicines as prescribed. I will not change the dose or schedule unless my care team tells me to. There will be no refills if I run out early.  I may be contactedwithout warning and asked to complete a urine drug test or pill count at any time.   3. I will keep all my appointments, and understand this is part of the monitoring of controlled substances. My care team may require an office visit for EVERY  controlled substance refill. If I miss appointments or dont follow instructions, my care team may stop my medicine.  4. I will not ask other providers to prescribe controlled substances, and I will not accept controlled substances from other people. If I need another prescribed controlled substance for a new reason, I will tell my care team within 1 business day.  5. I will use one pharmacy to fill all of my controlled substance prescriptions, and it is up to me to make sure that I do not run out of my medicines on weekends or holidays. If my care team is willing to refill my controlled substance prescription without a visit, I must request refills only during office hours, refills may take up to 3 days to process, and it may take up to 5 to 7 days for my medicine to be mailed and ready at my pharmacy. Prescriptions will not be mailed anywhere except my pharmacy.    6. I am responsible for my prescriptions. If the medicine/prescription is lost or stolen, it will not be replaced. I also agree not to share controlled substance medicines with anyone.          [facility] INTERNAL MEDICINE  01/28/20  Patient:  Katty Patton  YOB: 1955  Medical Record Number: [id number]  CSN: [id number]    7. I agree to not use ANY illegal or recreational drugs. This includes marijuana, cocaine, bath salts or other drugs. I agree not to use alcohol unless my care team says I may. I agree to give urine samples whenever asked. If I dont give a urine sample, the care team may stop my medicine.    8. If I enroll in the [place] Medical Marijuana program, I will tell my care team. I will also sign an agreement to share my medical records with my care team.    9. I will bring in my list of medicines (or my medicine bottles) each time I come to the clinic.   10. I will tell my care team right away if I become pregnant or have a new medical problem treated outside of my regular clinic.  11. I understand that this medicine can affect my  thinking and judgment. It may be unsafe for me to drive, use machinery and do dangerous tasks. I will not do any of these things until I know how the medicine affects me. If my dose changes, I will wait to see how it affects me. I will contact my care team if I have concerns about medicine side effects.    I understand that if I do not follow any of the conditions above, my prescriptions or treatment may be stopped.      I agree that my provider, clinic care team, and pharmacy may work with any city, state or federal law enforcement agency that investigates the misuse, sale, or other diversion of my controlled medicine. I will allow my provider to discuss my care with or share a copy of this agreement with any other treating provider, pharmacy or emergency room where I receive care. I agree to give up (waive) any right of privacy or confidentiality with respect to these consents.   I have read this agreement and have asked questions about anything I did not understand.    ___________________________________________________________________________  Patient signature - Date/Time  -Katty Patton                                      ___________________________________________________________________________  Witness signature                                                                    ___________________________________________________________________________  Provider signature- Radha Spence MD

## 2021-06-23 NOTE — PROGRESS NOTES
Assessment and Plan:     1. Preventative health care  Here to update current medical labs as deemed necessary.  Recommend annual mammography.  Colon cancer screening is up-to-date.  She is due for a bone density at her convenience.  Screening labs as below.  Of note, she and her family continue to be healthy with regard to lifestyle.  She exercises regularly and maintains a healthy diet.  Her weight is down lower than usual as she believes her budesonide for eosinophilic esophagitis decreases appetite.  - HM2(CBC w/o Differential)  - Lipid Cascade FASTING  - DXA Bone Density Scan; Future  - Mammo Screening Bilateral; Future  - Basic Metabolic Panel    2. Visit for screening mammogram  Ordered  - Mammo Screening Bilateral; Future    3. Eosinophilic esophagitis  Seen regularly by gastroenterology.  She is on compounded budesonide preparation from the [facility].  She takes a capsule and empties the contents into some syrup and takes this nightly.  Omeprazole during the day.    4. Bradycardia  Asymptomatic    5. Menopause  With atrophic vaginitis.  Continue with Estrace  - estradiol (ESTRACE) 0.01 % (0.1 mg/gram) vaginal cream; INSERT 2 GRAMS BY VAGINAL ROUTE 3 TIMES PER WEEK  Dispense: 42.5 g; Refill: 0    6.  Milia-vaginal area  Reassurance provided.  Name of gynecologist provided patient wants to get a second opinion.  Reassured that this is benign  - Ambulatory referral to Gynecology    7. Impacted cerumen of left ear  Ear wash  - Ear wax removal          Radha Spence MD  1/29/2019    Chief Complaint:  Chief Complaint   Patient presents with     Follow-up     Medication Management     Discuss with MD  & Pt takes xanax acouple times a month CSA signed        History of Present Illness:  Katty is a 63 y.o. female who is here today for a check in with regard to usual medical problems.  Of note, she continues to deal with eosinophilic esophagitis.  She believes it is stable and follows with GI annually.  She  "has some small white papules on the vulvar area.  She would like these checked.  There is a skin lesion on the back that she would like checked as well.    She is otherwise doing well with no major symptoms.    Health maintenance is reviewed and updated in the chart.  She is due for screening labs, mammography and bone densitometry at her convenience.  Colon cancer screening is up-to-date.  Immunizations that are do include a Prevnar 13.  She should start the shingles vaccination series as well.    Review of Systems:    The rest of the review of systems are negative for all systems.    PFSH:  Social History: She is  with 1 son  Social History     Tobacco Use   Smoking Status Never Smoker   Smokeless Tobacco Never Used       Past Medical History: Eosinophilic esophagitis, TIA, bradycardia  Allergies   Allergen Reactions     Brimonidine      Dorzolamide      Latanoprost      Peanut Other (See Comments)       Family History: Reviewed  Family History   Problem Relation Age of Onset     Breast cancer Maternal Aunt 62     Dementia Maternal Aunt      Coronary artery disease Mother 61     Diabetes type II Mother      Sudden death Father      No Medical Problems Sister      Hypertension Brother      No Medical Problems Brother      No Medical Problems Brother      No Medical Problems Brother      No Medical Problems Brother        Physical Exam:  Vitals:    01/29/19 0944   BP: 132/74   Patient Site: Left Arm   Patient Position: Sitting   Cuff Size: Adult Regular   Pulse: (!) 52   SpO2: 98%   Weight: 119 lb 11.2 oz (54.3 kg)   Height: 5' 7\" (1.702 m)     Wt Readings from Last 3 Encounters:   01/29/19 119 lb 11.2 oz (54.3 kg)   12/07/16 124 lb (56.2 kg)   11/15/16 127 lb (57.6 kg)       General Appearance:  Alert, cooperative, no distress, appears stated age   Head:  Normocephalic, without obvious abnormality, atraumatic   Eyes:  PERRL, conjunctiva/corneas clear, EOM's intact   Ears:  Normal TM's and external ear " canals, both ears   Nose: Nares normal, septum midline,mucosa normal, no drainage    Throat: Lips, mucosa, and tongue normal; teeth and gums normal   Neck: Supple, symmetrical, trachea midline, no adenopathy;  thyroid: not enlarged, symmetric, no tenderness/mass/nodules; no carotid bruit or JVD   Back:   Symmetric, no curvature, ROM normal, no CVA tenderness   Lungs:   Clear to auscultation bilaterally, respirations unlabored   Heart:  Regular rate and rhythm, S1 and S2 normal, no murmur, rub, or gallop   Abdomen:   Soft, non-tender, bowel sounds active all four quadrants,  no masses, no organomegaly, no hernias    Extremities: Extremities normal, atraumatic, no cyanosis or edema   Skin: Skin color, texture, turgor normal, no rashes or lesions   Lymph nodes: Cervical, supraclavicular, and axillary nodes normal   Neurologic: Normal, CN 2-12 intact  Pelvic exam performed.  There are small white pustular lesions consistent with milia.     Medications:  Current Outpatient Medications   Medication Sig Dispense Refill     ALPRAZolam (XANAX) 0.25 MG tablet Take 1 tablet (0.25 mg total) by mouth daily as needed for anxiety. 30 tablet 1     betaxolol (BETOPTIC-S) 0.5 % ophthalmic suspension 2 (two) times a day.  11     budesonide (PULMICORT) 1 mg/2 mL nebulizer solution 1 mg daily.   1     cholecalciferol, vitamin D3, 1,000 unit tablet Take 1,800 Units by mouth daily.       estradiol (ESTRACE) 0.01 % (0.1 mg/gram) vaginal cream INSERT 2 GRAMS BY VAGINAL ROUTE 3 TIMES PER WEEK 42.5 g 0     multivitamin therapeutic (THERAGRAN) tablet Take 1 tablet by mouth daily.       OMEGA-3/DHA/EPA/FISH OIL (FISH OIL-OMEGA-3 FATTY ACIDS) 300-1,000 mg capsule Take 2 g by mouth daily.       omeprazole (PRILOSEC) 20 MG capsule        ZIOPTAN, PF, 0.0015 % Dpet ophthalmic dropperette   11     amLODIPine (NORVASC) 2.5 MG tablet Take 1 tablet (2.5 mg total) by mouth daily. 30 tablet 11     No current facility-administered medications for this  visit.        Immunizations:  Immunization History   Administered Date(s) Administered     Hep A, historic 01/13/2006, 12/13/2006     Influenza [id number], 01/05/2010     Influenza, inj, historic,unspecified 09/18/2009, 10/14/2010, 09/12/2012, 10/24/2016     Influenza,inj,MDCK,PF,Quad >4yrs 10/17/2018     Td, adult adsorbed, PF 10/31/2016     Td,adult,historic,unspecified 01/21/1997, 01/13/2006

## 2021-07-06 NOTE — TELEPHONE ENCOUNTER
Telephone Encounter by Vanesa Daniel LPN at 7/6/2021  7:20 AM     Author: Vanesa Daniel LPN Service: -- Author Type: Licensed Nurse    Filed: 7/6/2021  7:20 AM Encounter Date: 8/2/2019 Status: Signed    : Vanesa Daniel LPN (Licensed Nurse)       error

## 2021-09-02 ENCOUNTER — NURSE TRIAGE (OUTPATIENT)
Dept: NURSING | Facility: CLINIC | Age: 66
End: 2021-09-02

## 2021-09-02 NOTE — TELEPHONE ENCOUNTER
"\"I am having SOB, palpitations and dizziness. It's like a flutter. It seems like my heart is racing at times and skipping a beat. I have had it before, worn a Holter monitor and it seems like it's happening more when I work out or with exertion. It comes and goes, but I am concerned.\"  Denies other sx  Triaged and advised ED  Patient prefers to make an appt   Transferred to scheduling  Za Hopson RN [facility]           Reason for Disposition    Extra heart beats OR irregular heart beating   (i.e., \"palpitations\")    New or worsened shortness of breath with activity (dyspnea on exertion)    Additional Information    Negative: [1] Breathing stopped AND [2] hasn't returned    Negative: Choking on something    Negative: Severe difficulty breathing (e.g., struggling for each breath, speaks in single words)    Negative: Bluish (or gray) lips or face now    Negative: Difficult to awaken or acting confused (e.g., disoriented, slurred speech)    Negative: Passed out (i.e., lost consciousness, collapsed and was not responding)    Negative: Wheezing started suddenly after medicine, an allergic food or bee sting    Negative: Stridor    Negative: Slow, shallow and weak breathing    Negative: Sounds like a life-threatening emergency to the triager    Negative: [1] MODERATE difficulty breathing (e.g., speaks in phrases, SOB even at rest, pulse 100-120) AND [2] NEW-onset or WORSE than normal    Negative: Wheezing can be heard across the room    Negative: Drooling or spitting out saliva (because can't swallow)    Negative: History of prior \"blood clot\" in leg or lungs (i.e., deep vein thrombosis, pulmonary embolism)    Negative: History of inherited increased risk of blood clots (e.g., Factor 5 Leiden, Anti-thrombin 3, Protein C or Protein S deficiency, Prothrombin mutation)    Negative: Major surgery in the past month    Negative: Hip or leg fracture (broken bone) in past month (or had cast on leg or ankle in " past month)    Negative: Illness requiring prolonged bedrest in past month (e.g., immobilization, long hospital stay)    Negative: Long-distance travel in past month (e.g., car, bus, train, plane; with trip lasting 6 or more hours)    Protocols used: BREATHING DIFFICULTY-A-AH, HEART RATE AND HEARTBEAT [id number]

## 2021-09-10 ENCOUNTER — TELEPHONE (OUTPATIENT)
Dept: INTERNAL MEDICINE | Facility: CLINIC | Age: 66
End: 2021-09-10

## 2021-09-10 ENCOUNTER — OFFICE VISIT (OUTPATIENT)
Dept: INTERNAL MEDICINE | Facility: CLINIC | Age: 66
End: 2021-09-10
Payer: MEDICARE

## 2021-09-10 VITALS
BODY MASS INDEX: 18.29 KG/M2 | WEIGHT: 116.8 LBS | SYSTOLIC BLOOD PRESSURE: 138 MMHG | HEART RATE: 72 BPM | OXYGEN SATURATION: 98 % | DIASTOLIC BLOOD PRESSURE: 88 MMHG

## 2021-09-10 DIAGNOSIS — N95.2 ATROPHIC VAGINITIS: ICD-10-CM

## 2021-09-10 DIAGNOSIS — I49.9 IRREGULAR HEART BEAT: Primary | ICD-10-CM

## 2021-09-10 DIAGNOSIS — F51.01 PRIMARY INSOMNIA: ICD-10-CM

## 2021-09-10 DIAGNOSIS — R06.02 SHORTNESS OF BREATH: ICD-10-CM

## 2021-09-10 LAB
ALBUMIN SERPL-MCNC: 4.1 G/DL (ref 3.5–5)
ALP SERPL-CCNC: 41 U/L (ref 45–120)
ALT SERPL W P-5'-P-CCNC: 18 U/L (ref 0–45)
ANION GAP SERPL CALCULATED.3IONS-SCNC: 11 MMOL/L (ref 5–18)
AST SERPL W P-5'-P-CCNC: 24 U/L (ref 0–40)
BILIRUB SERPL-MCNC: 0.7 MG/DL (ref 0–1)
BUN SERPL-MCNC: 9 MG/DL (ref 8–22)
CALCIUM SERPL-MCNC: 9.3 MG/DL (ref 8.5–10.5)
CHLORIDE BLD-SCNC: 99 MMOL/L (ref 98–107)
CO2 SERPL-SCNC: 24 MMOL/L (ref 22–31)
CREAT SERPL-MCNC: 0.69 MG/DL (ref 0.6–1.1)
D DIMER PPP FEU-MCNC: <=0.27 UG/ML FEU (ref 0–0.5)
ERYTHROCYTE [DISTWIDTH] IN BLOOD BY AUTOMATED COUNT: 12 % (ref 10–15)
GFR SERPL CREATININE-BSD FRML MDRD: >90 ML/MIN/1.73M2
GLUCOSE BLD-MCNC: 89 MG/DL (ref 70–125)
HCT VFR BLD AUTO: 36.3 % (ref 35–47)
HGB BLD-MCNC: 12.6 G/DL (ref 11.7–15.7)
MAGNESIUM SERPL-MCNC: 2 MG/DL (ref 1.8–2.6)
MCH RBC QN AUTO: 31.6 PG (ref 26.5–33)
MCHC RBC AUTO-ENTMCNC: 34.7 G/DL (ref 31.5–36.5)
MCV RBC AUTO: 91 FL (ref 78–100)
PLATELET # BLD AUTO: 229 10E3/UL (ref 150–450)
POTASSIUM BLD-SCNC: 3.8 MMOL/L (ref 3.5–5)
PROT SERPL-MCNC: 6.9 G/DL (ref 6–8)
RBC # BLD AUTO: 3.99 10E6/UL (ref 3.8–5.2)
SODIUM SERPL-SCNC: 134 MMOL/L (ref 136–145)
TSH SERPL DL<=0.005 MIU/L-ACNC: 0.82 UIU/ML (ref 0.3–5)
WBC # BLD AUTO: 5.4 10E3/UL (ref 4–11)

## 2021-09-10 PROCEDURE — 93005 ELECTROCARDIOGRAM TRACING: CPT | Performed by: INTERNAL MEDICINE

## 2021-09-10 PROCEDURE — 85027 COMPLETE CBC AUTOMATED: CPT | Performed by: INTERNAL MEDICINE

## 2021-09-10 PROCEDURE — 83735 ASSAY OF MAGNESIUM: CPT | Performed by: INTERNAL MEDICINE

## 2021-09-10 PROCEDURE — 93010 ELECTROCARDIOGRAM REPORT: CPT | Mod: OFF | Performed by: INTERNAL MEDICINE

## 2021-09-10 PROCEDURE — 99214 OFFICE O/P EST MOD 30 MIN: CPT | Performed by: INTERNAL MEDICINE

## 2021-09-10 PROCEDURE — 84443 ASSAY THYROID STIM HORMONE: CPT | Performed by: INTERNAL MEDICINE

## 2021-09-10 PROCEDURE — 80053 COMPREHEN METABOLIC PANEL: CPT | Performed by: INTERNAL MEDICINE

## 2021-09-10 PROCEDURE — 36415 COLL VENOUS BLD VENIPUNCTURE: CPT | Performed by: INTERNAL MEDICINE

## 2021-09-10 PROCEDURE — 85379 FIBRIN DEGRADATION QUANT: CPT | Performed by: INTERNAL MEDICINE

## 2021-09-10 RX ORDER — ALPRAZOLAM 0.25 MG
0.25 TABLET ORAL
COMMUNITY
Start: 2020-03-23 | End: 2022-11-25

## 2021-09-10 RX ORDER — TEMAZEPAM 7.5 MG/1
7.5 CAPSULE ORAL
Qty: 21 CAPSULE | Refills: 1 | Status: CANCELLED | OUTPATIENT
Start: 2021-09-10

## 2021-09-10 RX ORDER — ESTRADIOL 0.1 MG/G
0 CREAM VAGINAL WEEKLY
Qty: 42.5 G | Refills: 3 | Status: SHIPPED | OUTPATIENT
Start: 2021-09-10 | End: 2021-09-13

## 2021-09-10 RX ORDER — ALPRAZOLAM 0.25 MG
0.25 TABLET ORAL
Qty: 30 TABLET | Refills: 5 | Status: CANCELLED | OUTPATIENT
Start: 2021-09-10

## 2021-09-10 RX ORDER — TEMAZEPAM 7.5 MG/1
CAPSULE ORAL
Qty: 30 CAPSULE | Refills: 0 | Status: SHIPPED | OUTPATIENT
Start: 2021-09-10 | End: 2021-10-19

## 2021-09-10 NOTE — PROGRESS NOTES
[facility] Follow Up Note    Assessment/Plan:  1. Irregular heart beat  Known history of PACs and PVCs.  Increase in irregularity noted-particularly with exertion.  Has had significant bradycardia in the past with oral beta-blockers.  Recommendation: Diagnostics as below.  Encourage complete cessation of caffeine/fluids to 64 ounces/we will try to optimize sleep as this may be a contributing factor.    - Cardiac Event Monitor Adult Pediatric; Future  - EKG 12-lead, tracing only-personally reviewed.  Sinus rhythm with PACs.  No acute abnormalities  - Comprehensive metabolic panel; Future  - CBC with platelets; Future  - TSH with free T4 reflex; Future  - Magnesium; Future  - Comprehensive metabolic panel  - CBC with platelets  - TSH with free T4 reflex  - Magnesium  - Echo Stress Echocardiogram    2. Shortness of breath  With some desaturation with activity.  Resting oximeter 98%/with activity-91 to 92%.  Approximately 4-month history of decreased stamina and dyspnea-intermittent and on exertion.  Diagnostics: We will check D-dimer and labs as below.  Would like to rule out pulmonary embolus.  If this is negative-we will proceed with diagnostics as below.  We will do stress echocardiogram and event monitor due to the association of irregular heartbeats.    - Cardiac Event Monitor Adult Pediatric; Future  - EKG 12-lead, tracing only  - Comprehensive metabolic panel; Future  - CBC with platelets; Future  - TSH with free T4 reflex; Future  - Magnesium; Future  - Comprehensive metabolic panel  - CBC with platelets  - TSH with free T4 reflex  - Magnesium  - D dimer, quantitative  - Echo Stress Echocardiogram    3. Primary insomnia  May be a contributing factor to overall lack of wellbeing/irregular heartbeat/etc.  Also feels foggy.  Alprazolam-low-dose does not keep her asleep.  Recommendation: We will do short trial of temazepam and see if symptoms improve.  Have had discussion with regard to sleep  hygiene.  - temazepam (RESTORIL) 7.5 MG capsule; 1 to 2 capsules at bedtime  Dispense: 30 capsule; Refill: 0    4. Atrophic vaginitis    - estradiol (ESTRACE VAGINAL) 0.1 MG/GM vaginal cream; Place 0.001 g vaginally once a week  Dispense: 42.5 g; Refill: 3          Radha Spence MD, MD    Chief Complaint:  Chief Complaint   Patient presents with     Breathing Problem     Irregular Heart Beat       History of Present Illness:  Katty is a 66 year old female who is here today for evaluation of the following symptoms: She states that since spring, she has not been feeling herself.  She notes dyspnea on exertion, significant decrease in stamina-i.e. she and her  are quite active.  She states she typically can keep up but more recently notes that she is unable.  She feels an irregular heartbeat, occasional lightheadedness, and short of breath.  She does not have any underlying history of pulmonary disease.  She has a history of atrial and ventricular ectopy.  She has had bradycardia in the past-medication induced.    Her friend symptoms are strikingly similar to an office visit back in 2015.  She noticed the symptoms while hiking in [facility].  The work-up at that time was nonconclusive.  In addition, she was noted to have a bradycardia arrhythmia prior to having an endoscopy where she was diagnosed for eosinophilic esophagitis.    She did see Dr. Cornell Church-electrophysiology for evaluation of the bradycardia.    Bradycardia: The patient had a recent Holter monitor which showed some mild nocturnal bradycardia but no significant slowing or pauses.  The patient's heart rate response activities of daily living and appears to be normal.    Mildly elevated atrial ectopy: The patient showed evidence of PACs some with aberrant conduction.  No ectopic atrial tachycardia, atrial fibrillation, or other supraventricular tachycardia.  The patient may be at increased risk for developing atrial fibrillation over  "time.  The exact degree is not possible to quantitate.    Rare PVCs: The patient's ventricular ectopy is quite small.  There is some increase in frequency during the initial phases of exercise but it is suppressed when the patient hits peak exercise levels.    Atypical chest pain: The patient does have a family history of coronary disease.  Previous stress test about 18 months ago showed no evidence of ischemic disease.  The patient would be a good candidate for coronary calcium scoring to look for evidence of occult coronary artery disease.  This would permit intervention in the form of lipid lowering.  The patient and her  will consider this option further.     In addition to the above, she feels unsettled.  With the \"state of the world \", she has some anxiety.  She and her  are not traveling as often as they had been previously.  She is able to see her son and daughter-in-law.    Her eosinophilic esophagitis is stable.  She has not needed any further work-up.    In addition to all of the above, she has significant insomnia.  She states that she can often fall asleep but then wakes up.  Occasionally, sleep initiation is difficult.  Sleep hygiene is reviewed.  She goes to bed at a regular time.  She does not drink coffee.  She does not exercise late in the evening and is not typically on technology late in the evening.      Review of Systems:  A comprehensive review of systems was performed and was otherwise negative    PFSH:  Social History: She is  to Dr. Marco A Mackay.  They have 1 son who is a gastroenterology fellow at the [employer].  History   Smoking Status     Never Smoker   Smokeless Tobacco     Never Used       Past History:   Current Outpatient Medications   Medication Sig Dispense Refill     ALPRAZolam (XANAX) 0.25 MG tablet Take 0.25 mg by mouth       budesonide (PULMICORT) 1 MG/2ML neb solution 1 mg       estradiol (ESTRACE VAGINAL) 0.1 MG/GM vaginal cream Place 0.001 " g vaginally once a week 42.5 g 3     omeprazole (PRILOSEC) 20 MG CR capsule Take 1 capsule by mouth daily       Tafluprost 0.0015 % SOLN Place 1 drop into both eyes At Bedtime 30 each 11     temazepam (RESTORIL) 7.5 MG capsule 1 to 2 capsules at bedtime 30 capsule 0       Family History:     Physical Exam:  General Appearance:   She appears at baseline and in no acute distress  Vitals:    09/10/21 1459 09/10/21 1501   BP: 138/88    BP Location: Left arm    Patient Position: Sitting    Cuff Size: Adult Regular    Pulse: 72    SpO2: 91% 98%   Weight: 53 kg (116 lb 12.8 oz)      Wt Readings from Last 3 Encounters:   09/10/21 53 kg (116 lb 12.8 oz)   01/28/20 53.2 kg (117 lb 3.2 oz)   08/06/19 53.8 kg (118 lb 9.6 oz)     Body mass index is 18.29 kg/m .    Head neck exam is negative  Lungs are clear to auscultation and percussion  Cardiac exam reveals an occasional irregular systole  Abdomen is soft and nontender  Extremities are intact.  There is no calf tenderness or signs of DVT

## 2021-09-10 NOTE — TELEPHONE ENCOUNTER
Reason for Call:  Other call back    Detailed comments: Pharmacy calling to clarify instructions for:  Estradiol Cream     Phone Number Patient can be reached at: Other phone number:  [phone]    Best Time: ASAP    Can we leave a detailed message on this number? YES    Call taken on 9/10/2021 at 4:26 PM by Dayna Nur

## 2021-09-12 LAB
ATRIAL RATE - MUSE: 67 BPM
DIASTOLIC BLOOD PRESSURE - MUSE: NORMAL MMHG
INTERPRETATION ECG - MUSE: NORMAL
P AXIS - MUSE: 71 DEGREES
PR INTERVAL - MUSE: 152 MS
QRS DURATION - MUSE: 78 MS
QT - MUSE: 402 MS
QTC - MUSE: 424 MS
R AXIS - MUSE: 69 DEGREES
SYSTOLIC BLOOD PRESSURE - MUSE: NORMAL MMHG
T AXIS - MUSE: 68 DEGREES
VENTRICULAR RATE- MUSE: 67 BPM

## 2021-09-13 RX ORDER — ESTRADIOL 0.1 MG/G
CREAM VAGINAL
Qty: 42.5 G | Refills: 3 | Status: SHIPPED | OUTPATIENT
Start: 2021-09-13 | End: 2022-12-14

## 2021-09-13 NOTE — TELEPHONE ENCOUNTER
Pharmacist wants clarification on sig.   Please review and send corrected Estradiol prescription.

## 2021-09-27 ENCOUNTER — HOSPITAL ENCOUNTER (OUTPATIENT)
Dept: CARDIOLOGY | Facility: HOSPITAL | Age: 66
End: 2021-09-27
Attending: INTERNAL MEDICINE
Payer: MEDICARE

## 2021-09-27 DIAGNOSIS — I49.9 IRREGULAR HEART BEAT: ICD-10-CM

## 2021-09-27 DIAGNOSIS — R06.02 SHORTNESS OF BREATH: ICD-10-CM

## 2021-09-27 DIAGNOSIS — R06.02 SOB (SHORTNESS OF BREATH): Primary | ICD-10-CM

## 2021-09-27 PROCEDURE — 255N000002 HC RX 255 OP 636: Performed by: INTERNAL MEDICINE

## 2021-09-27 PROCEDURE — C8928 TTE W OR W/O FOL W/CON,STRES: HCPCS

## 2021-09-27 PROCEDURE — 93016 CV STRESS TEST SUPVJ ONLY: CPT | Performed by: INTERNAL MEDICINE

## 2021-09-27 PROCEDURE — 93270 REMOTE 30 DAY ECG REV/REPORT: CPT

## 2021-09-27 PROCEDURE — 93321 DOPPLER ECHO F-UP/LMTD STD: CPT | Mod: 26 | Performed by: INTERNAL MEDICINE

## 2021-09-27 PROCEDURE — 93352 ADMIN ECG CONTRAST AGENT: CPT | Performed by: INTERNAL MEDICINE

## 2021-09-27 PROCEDURE — 93350 STRESS TTE ONLY: CPT | Mod: 26 | Performed by: INTERNAL MEDICINE

## 2021-09-27 PROCEDURE — 93228 REMOTE 30 DAY ECG REV/REPORT: CPT | Performed by: INTERNAL MEDICINE

## 2021-09-27 PROCEDURE — 93018 CV STRESS TEST I&R ONLY: CPT | Performed by: INTERNAL MEDICINE

## 2021-09-27 RX ADMIN — PERFLUTREN 5 ML: 6.52 INJECTION, SUSPENSION INTRAVENOUS at 09:55

## 2021-10-10 ENCOUNTER — HEALTH MAINTENANCE LETTER (OUTPATIENT)
Age: 66
End: 2021-10-10

## 2021-10-19 ENCOUNTER — ANCILLARY PROCEDURE (OUTPATIENT)
Dept: GENERAL RADIOLOGY | Facility: CLINIC | Age: 66
End: 2021-10-19
Attending: INTERNAL MEDICINE
Payer: MEDICARE

## 2021-10-19 ENCOUNTER — MYC MEDICAL ADVICE (OUTPATIENT)
Dept: INTERNAL MEDICINE | Facility: CLINIC | Age: 66
End: 2021-10-19

## 2021-10-19 ENCOUNTER — OFFICE VISIT (OUTPATIENT)
Dept: INTERNAL MEDICINE | Facility: CLINIC | Age: 66
End: 2021-10-19
Payer: MEDICARE

## 2021-10-19 ENCOUNTER — TELEPHONE (OUTPATIENT)
Dept: INTERNAL MEDICINE | Facility: CLINIC | Age: 66
End: 2021-10-19

## 2021-10-19 VITALS
WEIGHT: 116.4 LBS | DIASTOLIC BLOOD PRESSURE: 80 MMHG | RESPIRATION RATE: 16 BRPM | BODY MASS INDEX: 18.27 KG/M2 | SYSTOLIC BLOOD PRESSURE: 132 MMHG | HEIGHT: 67 IN | OXYGEN SATURATION: 100 % | HEART RATE: 54 BPM

## 2021-10-19 DIAGNOSIS — R93.89 ABNORMAL X-RAY: ICD-10-CM

## 2021-10-19 DIAGNOSIS — R06.09 OTHER FORM OF DYSPNEA: Primary | ICD-10-CM

## 2021-10-19 DIAGNOSIS — Z00.00 ENCOUNTER FOR PREVENTIVE CARE: ICD-10-CM

## 2021-10-19 DIAGNOSIS — R06.09 OTHER FORM OF DYSPNEA: ICD-10-CM

## 2021-10-19 DIAGNOSIS — N94.10 FEMALE DYSPAREUNIA: ICD-10-CM

## 2021-10-19 DIAGNOSIS — F51.01 PRIMARY INSOMNIA: ICD-10-CM

## 2021-10-19 LAB
CHOLEST SERPL-MCNC: 221 MG/DL
FASTING STATUS PATIENT QL REPORTED: YES
HDLC SERPL-MCNC: 91 MG/DL
LDLC SERPL CALC-MCNC: 118 MG/DL
TRIGL SERPL-MCNC: 59 MG/DL

## 2021-10-19 PROCEDURE — 80061 LIPID PANEL: CPT | Performed by: INTERNAL MEDICINE

## 2021-10-19 PROCEDURE — 36415 COLL VENOUS BLD VENIPUNCTURE: CPT | Performed by: INTERNAL MEDICINE

## 2021-10-19 PROCEDURE — 71046 X-RAY EXAM CHEST 2 VIEWS: CPT | Mod: TC | Performed by: RADIOLOGY

## 2021-10-19 PROCEDURE — 86803 HEPATITIS C AB TEST: CPT | Performed by: INTERNAL MEDICINE

## 2021-10-19 PROCEDURE — 99214 OFFICE O/P EST MOD 30 MIN: CPT | Performed by: INTERNAL MEDICINE

## 2021-10-19 RX ORDER — TIMOLOL 5 MG/ML
SOLUTION/ DROPS OPHTHALMIC
COMMUNITY
Start: 2021-09-09

## 2021-10-19 RX ORDER — TEMAZEPAM 7.5 MG/1
CAPSULE ORAL
Qty: 30 CAPSULE | Refills: 5 | Status: SHIPPED | OUTPATIENT
Start: 2021-10-19 | End: 2022-05-02

## 2021-10-19 ASSESSMENT — MIFFLIN-ST. JEOR: SCORE: 1100.62

## 2021-10-19 NOTE — PROGRESS NOTES
[facility] Follow Up Note    Assessment/Plan:  1. Other form of dyspnea  With some desaturation with exercise-normal cardiac stress test and Holter monitor.  Recommendation: We will proceed with a chest x-ray prior to CT scan.  D-dimer normal.  We will also proceed with lung function tests.  Follow-up when tests are complete  - XR Chest 2 Views; Future  - CT Chest w/o Contrast; Future  - General PFT Lab (Please always keep checked); Future  - Pulmonary Function Test; Future  - Exercise Spirometry Test ([facility]); Future    2. Encounter for preventive care  We will update labs  - Lipid panel reflex to direct LDL Fasting; Future  - Hepatitis C antibody; Future    3. Primary insomnia  We will renew temazepam which helps.  Encourage sporadic use as opposed to daily use.    4. Female dyspareunia  10 you with vaginal estrogens.  Gynecologic consult if further concern.  May need to do a pelvic exam at next visit.      Follow-up in 3 months    Radha Spence MD, MD    Chief Complaint:  Chief Complaint   Patient presents with     Follow Up     Cardiac and lab tests       History of Present Illness:  Katty is a 66 year old female who is here today for follow-up with regard to a variety of issues.  First, she had been noticing dyspnea on exertion-noncharacteristic for her.  She has been an avid exerciser/athlete.  She noted significant inability to keep up with  and son.  She had a normal stress test with normal exercise tolerance.  Additionally, she had a Holter monitor which continued to show PACs and PVCs but no new or dangerous arrhythmia.    She did have some desaturation on pulse oximetry with minimal exercise here in the office.  She continues to worry about underlying issues.  She is trying to push herself through and training harder.  She notes occasional lightheadedness.    Additional issues discussed today include primary insomnia.  She has done better with temazepam.  She states that she is in a  "better frame of mind on the days that she can sleep.  She finds that it works for her some nights but does not work for her on other nights and is wondering how to proceed with taking this medication.    She has Raynaud's phenomenon.  She would like to restart her amlodipine if able    We have discussed also dyspareunia and vaginal estrogens.  We have discussed the schedule/timing/etc.  Of note, she is worried that something more serious could be wrong with her.  She had a normal pelvic exam a year ago.    Review of Systems:  A comprehensive review of systems was performed and was otherwise negative    PFSH:  Social History:  with 1 adult child  History   Smoking Status     Never Smoker   Smokeless Tobacco     Never Used       Past History:   Current Outpatient Medications   Medication Sig Dispense Refill     ALPRAZolam (XANAX) 0.25 MG tablet Take 0.25 mg by mouth       budesonide (PULMICORT) 1 MG/2ML neb solution 1 mg       estradiol (ESTRACE VAGINAL) 0.1 MG/GM vaginal cream Half to one applicator three times weekly 42.5 g 3     omeprazole (PRILOSEC) 20 MG CR capsule Take 1 capsule by mouth daily       Tafluprost 0.0015 % SOLN Place 1 drop into both eyes At Bedtime 30 each 11     temazepam (RESTORIL) 7.5 MG capsule 1 to 2 capsules at bedtime 30 capsule 0     timolol, PF, (TIMOPTIC OCUDOSE) 0.5 % ophthalmic solution INSTILL 1 DROP INTO BOTH EYES TWICE A DAY         Family History:     Physical Exam:  General Appearance:   She appears quite well and in no acute distress  Vitals:    10/19/21 1004   BP: 132/80   BP Location: Left arm   Patient Position: Sitting   Cuff Size: Adult Regular   Pulse: 54   Resp: 16   SpO2: 100%   Weight: 52.8 kg (116 lb 6.4 oz)   Height: 1.702 m (5' 7\")     Wt Readings from Last 3 Encounters:   10/19/21 52.8 kg (116 lb 6.4 oz)   09/10/21 53 kg (116 lb 12.8 oz)   01/28/20 53.2 kg (117 lb 3.2 oz)     Body mass index is 18.23 kg/m .      "

## 2021-10-20 ENCOUNTER — TRANSFERRED RECORDS (OUTPATIENT)
Dept: HEALTH INFORMATION MANAGEMENT | Facility: CLINIC | Age: 66
End: 2021-10-20
Payer: COMMERCIAL

## 2021-10-20 LAB — HCV AB SERPL QL IA: NEGATIVE

## 2021-10-22 DIAGNOSIS — F51.01 PRIMARY INSOMNIA: Primary | ICD-10-CM

## 2021-10-23 RX ORDER — TEMAZEPAM 7.5 MG/1
CAPSULE ORAL
Qty: 30 CAPSULE | Refills: 5 | OUTPATIENT
Start: 2021-10-23

## 2021-10-24 NOTE — TELEPHONE ENCOUNTER
Refill request already responded to. Temazepam (RESTORIL) 7.5 MG capsule filled 10/19/2021 for 30 capsules and 5 refills.     temazepam (RESTORIL) 7.5 MG capsule 30 capsule 5 10/19/2021  No   Si to 2 capsules at bedtime   Sent to pharmacy as: Temazepam 7.5 MG Oral Capsule (RESTORIL)   Class: E-Prescribe   Order: [id number]   E-Prescribing Status: Receipt confirmed by pharmacy (10/19/2021 11:14 AM CDT     Beverly Handley RN/[facility]

## 2021-11-16 ENCOUNTER — HOSPITAL ENCOUNTER (OUTPATIENT)
Dept: RESPIRATORY THERAPY | Facility: CLINIC | Age: 66
Discharge: HOME | End: 2021-11-16
Attending: INTERNAL MEDICINE | Admitting: INTERNAL MEDICINE
Payer: MEDICARE

## 2021-11-16 DIAGNOSIS — R06.09 OTHER FORM OF DYSPNEA: ICD-10-CM

## 2021-11-16 LAB — HGB BLD-MCNC: 12.8 G/DL (ref 11.7–15.7)

## 2021-11-16 PROCEDURE — 94726 PLETHYSMOGRAPHY LUNG VOLUMES: CPT | Mod: 26 | Performed by: INTERNAL MEDICINE

## 2021-11-16 PROCEDURE — 999N000157 HC STATISTIC RCP TIME EA 10 MIN

## 2021-11-16 PROCEDURE — 94729 DIFFUSING CAPACITY: CPT | Mod: 26 | Performed by: INTERNAL MEDICINE

## 2021-11-16 PROCEDURE — 36415 COLL VENOUS BLD VENIPUNCTURE: CPT | Performed by: INTERNAL MEDICINE

## 2021-11-16 PROCEDURE — 85018 HEMOGLOBIN: CPT | Performed by: INTERNAL MEDICINE

## 2021-11-16 PROCEDURE — 94640 AIRWAY INHALATION TREATMENT: CPT

## 2021-11-16 PROCEDURE — 94060 EVALUATION OF WHEEZING: CPT | Mod: 26 | Performed by: INTERNAL MEDICINE

## 2021-11-16 PROCEDURE — 94729 DIFFUSING CAPACITY: CPT

## 2021-11-16 PROCEDURE — 94726 PLETHYSMOGRAPHY LUNG VOLUMES: CPT

## 2021-11-16 PROCEDURE — 94060 EVALUATION OF WHEEZING: CPT

## 2021-11-16 RX ORDER — ALBUTEROL SULFATE 0.83 MG/ML
2.5 SOLUTION RESPIRATORY (INHALATION) ONCE
Status: COMPLETED | OUTPATIENT
Start: 2021-11-16 | End: 2021-11-16

## 2021-11-16 RX ADMIN — ALBUTEROL SULFATE 2.5 MG: 0.83 SOLUTION RESPIRATORY (INHALATION) at 10:24

## 2021-11-16 NOTE — PROGRESS NOTES
PFT NOTE  Pt gave good effort & was cooperative, was able to meet ATS standards for acceptability and repeatability. albuterol was given for the bronchodilator. Patient left in no distress.    Za Rai, RT

## 2021-11-18 LAB
DLCOCOR-%PRED-PRE: 83 %
DLCOCOR-PRE: 18.42 ML/MIN/MMHG
DLCOUNC-%PRED-PRE: 81 %
DLCOUNC-PRE: 18.07 ML/MIN/MMHG
DLCOUNC-PRED: 22.18 ML/MIN/MMHG
ERV-%PRED-PRE: 88 %
ERV-PRE: 1.09 L
ERV-PRED: 1.24 L
EXPTIME-PRE: 6.09 SEC
FEF2575-%PRED-POST: 71 %
FEF2575-%PRED-PRE: 60 %
FEF2575-POST: 1.56 L/SEC
FEF2575-PRE: 1.32 L/SEC
FEF2575-PRED: 2.18 L/SEC
FEFMAX-%PRED-PRE: 74 %
FEFMAX-PRE: 4.81 L/SEC
FEFMAX-PRED: 6.48 L/SEC
FEV1-%PRED-PRE: 83 %
FEV1-PRE: 2.16 L
FEV1FEV6-PRE: 67 %
FEV1FEV6-PRED: 80 %
FEV1FVC-PRE: 67 %
FEV1FVC-PRED: 78 %
FEV1SVC-PRE: 66 %
FEV1SVC-PRED: 73 %
FIFMAX-PRE: 1.57 L/SEC
FRCPLETH-%PRED-PRE: 134 %
FRCPLETH-PRE: 3.92 L
FRCPLETH-PRED: 2.91 L
FVC-%PRED-PRE: 95 %
FVC-PRE: 3.21 L
FVC-PRED: 3.36 L
IC-%PRED-PRE: 93 %
IC-PRE: 2.19 L
IC-PRED: 2.35 L
RVPLETH-%PRED-PRE: 130 %
RVPLETH-PRE: 2.83 L
RVPLETH-PRED: 2.16 L
TLCPLETH-%PRED-PRE: 110 %
TLCPLETH-PRE: 6.11 L
TLCPLETH-PRED: 5.53 L
VA-%PRED-PRE: 101 %
VA-PRE: 5.55 L
VC-%PRED-PRE: 91 %
VC-PRE: 3.28 L
VC-PRED: 3.59 L

## 2022-01-19 ENCOUNTER — LAB (OUTPATIENT)
Dept: LAB | Facility: CLINIC | Age: 67
End: 2022-01-19
Payer: MEDICARE

## 2022-01-19 DIAGNOSIS — R21 RASH AND OTHER NONSPECIFIC SKIN ERUPTION: Primary | ICD-10-CM

## 2022-01-19 LAB
C REACTIVE PROTEIN LHE: <0.1 MG/DL (ref 0–0.8)
ERYTHROCYTE [SEDIMENTATION RATE] IN BLOOD BY WESTERGREN METHOD: 8 MM/HR (ref 0–20)
RHEUMATOID FACT SER NEPH-ACNC: <15 IU/ML (ref 0–30)

## 2022-01-19 PROCEDURE — 86235 NUCLEAR ANTIGEN ANTIBODY: CPT

## 2022-01-19 PROCEDURE — 86431 RHEUMATOID FACTOR QUANT: CPT

## 2022-01-19 PROCEDURE — 86038 ANTINUCLEAR ANTIBODIES: CPT

## 2022-01-19 PROCEDURE — 36415 COLL VENOUS BLD VENIPUNCTURE: CPT

## 2022-01-19 PROCEDURE — 86140 C-REACTIVE PROTEIN: CPT

## 2022-01-19 PROCEDURE — 85652 RBC SED RATE AUTOMATED: CPT

## 2022-01-20 LAB
ENA SM IGG SER IA-ACNC: <1.6 U/ML
ENA SM IGG SER IA-ACNC: NEGATIVE
ENA SS-A AB SER IA-ACNC: 0.5 U/ML
ENA SS-A AB SER IA-ACNC: 0.7 U/ML
ENA SS-A AB SER IA-ACNC: NEGATIVE
ENA SS-A AB SER IA-ACNC: NEGATIVE
ENA SS-B IGG SER IA-ACNC: 0.7 U/ML
ENA SS-B IGG SER IA-ACNC: <0.6 U/ML
ENA SS-B IGG SER IA-ACNC: NEGATIVE
ENA SS-B IGG SER IA-ACNC: NEGATIVE
U1 SNRNP IGG SER IA-ACNC: 1.3 U/ML
U1 SNRNP IGG SER IA-ACNC: NEGATIVE

## 2022-01-21 LAB — ANA SER QL IF: NEGATIVE

## 2022-02-18 DIAGNOSIS — I73.00 RAYNAUD'S SYNDROME WITHOUT GANGRENE: ICD-10-CM

## 2022-02-21 RX ORDER — AMLODIPINE BESYLATE 5 MG/1
TABLET ORAL
Qty: 90 TABLET | Refills: 3 | Status: SHIPPED | OUTPATIENT
Start: 2022-02-21 | End: 2023-02-07

## 2022-02-21 NOTE — TELEPHONE ENCOUNTER
"Routing refill request to provider for review/approval because:  Early refill request    Last Written Prescription Date:  3/23/2021  Last Fill Quantity: 90,  # refills: 3   Last office visit provider:  10/19/2021 Dr. Spence     amLODIPine (NORVASC) 5 MG tablet 90 tablet 3 3/23/2020 3/23/2021 No   Sig - Route: Take 1 tablet (5 mg total) by mouth daily. - Oral   Sent to pharmacy as: amLODIPine 5 mg tablet (NORVASC)   E-Prescribing Status: Receipt confirmed by pharmacy (3/23/2020 11:15 AM CDT         Requested Prescriptions   Pending Prescriptions Disp Refills     amLODIPine (NORVASC) 5 MG tablet [Pharmacy Med Name: AMLODIPINE BESYLATE 5 MG TAB] 90 tablet 3     Sig: TAKE 1 TABLET BY MOUTH EVERY DAY       Calcium Channel Blockers Protocol  Failed - 2/18/2022 11:02 AM        Failed - Medication is active on med list        Passed - Blood pressure under 140/90 in past 12 months     BP Readings from Last 3 Encounters:   10/19/21 132/80   09/10/21 138/88   01/28/20 136/68                 Passed - Recent (12 mo) or future (30 days) visit within the authorizing provider's specialty     Patient has had an office visit with the authorizing provider or a provider within the authorizing providers department within the previous 12 mos or has a future within next 30 days. See \"Patient Info\" tab in inbasket, or \"Choose Columns\" in Meds & Orders section of the refill encounter.              Passed - Patient is age 18 or older        Passed - No active pregnancy on record        Passed - Normal serum creatinine on file in past 12 months     Recent Labs   Lab Test 09/10/21  1444   CR 0.69       Ok to refill medication if creatinine is low          Passed - No positive pregnancy test in past 12 months             Meryl Che RN 02/21/22 9:29 AM  "

## 2022-04-28 DIAGNOSIS — F51.01 PRIMARY INSOMNIA: ICD-10-CM

## 2022-05-01 NOTE — TELEPHONE ENCOUNTER
Routing refill request to provider for review/approval because:  Drug not on the G refill protocol     Last Written Prescription Date:  10/19/21  Last Fill Quantity: 30,  # refills: 5   Last office visit provider:  10/19/21     Requested Prescriptions   Pending Prescriptions Disp Refills     temazepam (RESTORIL) 7.5 MG capsule [Pharmacy Med Name: TEMAZEPAM 7.5 MG CAPSULE] 30 capsule      Sig: TAKE 1 TO 2 CAPSULES BY MOUTH AT BEDTIME       There is no refill protocol information for this order          Marline John 05/01/22 5:03 PM

## 2022-05-02 RX ORDER — TEMAZEPAM 7.5 MG/1
CAPSULE ORAL
Qty: 30 CAPSULE | Refills: 2 | Status: SHIPPED | OUTPATIENT
Start: 2022-05-02 | End: 2022-11-25

## 2022-05-21 ENCOUNTER — HEALTH MAINTENANCE LETTER (OUTPATIENT)
Age: 67
End: 2022-05-21

## 2022-06-14 ENCOUNTER — TRANSFERRED RECORDS (OUTPATIENT)
Dept: HEALTH INFORMATION MANAGEMENT | Facility: CLINIC | Age: 67
End: 2022-06-14
Payer: MEDICARE

## 2022-09-18 ENCOUNTER — HEALTH MAINTENANCE LETTER (OUTPATIENT)
Age: 67
End: 2022-09-18

## 2022-11-23 ENCOUNTER — TELEPHONE (OUTPATIENT)
Dept: INTERNAL MEDICINE | Facility: CLINIC | Age: 67
End: 2022-11-23

## 2022-11-23 DIAGNOSIS — F51.01 PRIMARY INSOMNIA: Primary | ICD-10-CM

## 2022-11-23 NOTE — TELEPHONE ENCOUNTER
General Call      Reason for Call:  Pt calling stating she has questions regarding medications    Please advise      What are your questions or concerns:  n/a    Date of last appointment with provider: n/a    Could we send this information to you in [employer] or would you prefer to receive a phone call?:   Patient would prefer a phone call   Okay to leave a detailed message?: Yes at Cell number on file:    Telephone Information:   Mobile [phone]

## 2022-11-23 NOTE — TELEPHONE ENCOUNTER
C/o insomnia. Temazepam is not working as well as it did in the beginning. She can't stay asleep. Can she go back to Xanax?   This was changed due to being so tried in the morning.   She would like to try a smaller dose of Xanax OR a different medication.

## 2022-11-25 RX ORDER — ALPRAZOLAM 0.25 MG
0.25 TABLET ORAL
Qty: 30 TABLET | Refills: 3 | Status: SHIPPED | OUTPATIENT
Start: 2022-11-25 | End: 2023-05-22

## 2022-11-25 NOTE — TELEPHONE ENCOUNTER
Talked with Katty.    Informed her that  Xanax or alprazolam has sy ordered. Per Dr. Spence, informed her that she could actually cut it in half and take about an hour before bedtime.     Pt verbalized understanding. No further questions.

## 2022-12-14 ENCOUNTER — MYC MEDICAL ADVICE (OUTPATIENT)
Dept: INTERNAL MEDICINE | Facility: CLINIC | Age: 67
End: 2022-12-14

## 2022-12-14 DIAGNOSIS — N95.2 ATROPHIC VAGINITIS: ICD-10-CM

## 2022-12-14 RX ORDER — ESTRADIOL 0.1 MG/G
CREAM VAGINAL
Qty: 42.5 G | Refills: 3 | Status: SHIPPED | OUTPATIENT
Start: 2022-12-14 | End: 2022-12-16

## 2022-12-16 RX ORDER — ESTRADIOL 0.1 MG/G
CREAM VAGINAL
Qty: 42.5 G | Refills: 3 | Status: SHIPPED | OUTPATIENT
Start: 2022-12-16

## 2022-12-20 ENCOUNTER — TELEPHONE (OUTPATIENT)
Dept: INTERNAL MEDICINE | Facility: CLINIC | Age: 67
End: 2022-12-20

## 2022-12-20 DIAGNOSIS — Z71.84 TRAVEL ADVICE ENCOUNTER: Primary | ICD-10-CM

## 2022-12-20 RX ORDER — NIRMATRELVIR AND RITONAVIR 300-100 MG
3 KIT ORAL 2 TIMES DAILY
Qty: 30 EACH | Refills: 0 | Status: SHIPPED | OUTPATIENT
Start: 2022-12-20 | End: 2023-01-23

## 2022-12-20 NOTE — TELEPHONE ENCOUNTER
Please contact Katty and let her know that I did order the packs loaded.  It cannot be taken with the alprazolam- just KARON.    It has not been indicated specifically for travel.  I did write that you are taking this med for travel and therefore you may get charged for it.  Just IONAI

## 2023-02-06 ASSESSMENT — ENCOUNTER SYMPTOMS
CHILLS: 0
FREQUENCY: 0
WEAKNESS: 0
SHORTNESS OF BREATH: 0
BREAST MASS: 0
ABDOMINAL PAIN: 0
DIZZINESS: 0
HEMATURIA: 0
PALPITATIONS: 0
NERVOUS/ANXIOUS: 1
DIARRHEA: 0
DYSURIA: 0
PARESTHESIAS: 1
ARTHRALGIAS: 0
SORE THROAT: 0
HEARTBURN: 0
MYALGIAS: 0
COUGH: 0
CONSTIPATION: 0
FEVER: 0
HEMATOCHEZIA: 0
NAUSEA: 0
HEADACHES: 0
JOINT SWELLING: 0
EYE PAIN: 0

## 2023-02-06 ASSESSMENT — ACTIVITIES OF DAILY LIVING (ADL): CURRENT_FUNCTION: NO ASSISTANCE NEEDED

## 2023-02-07 ENCOUNTER — OFFICE VISIT (OUTPATIENT)
Dept: INTERNAL MEDICINE | Facility: CLINIC | Age: 68
End: 2023-02-07
Payer: MEDICARE

## 2023-02-07 VITALS
OXYGEN SATURATION: 100 % | HEART RATE: 67 BPM | WEIGHT: 110.3 LBS | BODY MASS INDEX: 17.73 KG/M2 | HEIGHT: 66 IN | DIASTOLIC BLOOD PRESSURE: 68 MMHG | RESPIRATION RATE: 20 BRPM | SYSTOLIC BLOOD PRESSURE: 122 MMHG

## 2023-02-07 DIAGNOSIS — Z78.0 ASYMPTOMATIC MENOPAUSE: ICD-10-CM

## 2023-02-07 DIAGNOSIS — K20.0 EOSINOPHILIC ESOPHAGITIS: ICD-10-CM

## 2023-02-07 DIAGNOSIS — Z00.00 ENCOUNTER FOR MEDICARE ANNUAL WELLNESS EXAM: Primary | ICD-10-CM

## 2023-02-07 DIAGNOSIS — N95.2 POSTMENOPAUSAL ATROPHIC VAGINITIS: ICD-10-CM

## 2023-02-07 DIAGNOSIS — Z12.31 ENCOUNTER FOR SCREENING MAMMOGRAM FOR BREAST CANCER: ICD-10-CM

## 2023-02-07 DIAGNOSIS — F51.01 PRIMARY INSOMNIA: ICD-10-CM

## 2023-02-07 DIAGNOSIS — Z13.220 SCREENING FOR HYPERLIPIDEMIA: ICD-10-CM

## 2023-02-07 DIAGNOSIS — I73.00 RAYNAUD'S SYNDROME WITHOUT GANGRENE: ICD-10-CM

## 2023-02-07 PROBLEM — G45.9 TRANSIENT ISCHEMIC ATTACK: Status: ACTIVE | Noted: 2023-02-07

## 2023-02-07 PROBLEM — D12.6 BENIGN NEOPLASM OF COLON: Status: ACTIVE | Noted: 2023-02-07

## 2023-02-07 PROBLEM — N95.1 SYMPTOMATIC MENOPAUSAL OR FEMALE CLIMACTERIC STATES: Status: ACTIVE | Noted: 2023-02-07

## 2023-02-07 PROBLEM — H40.9 GLAUCOMA: Status: ACTIVE | Noted: 2023-02-07

## 2023-02-07 PROBLEM — M54.2 CERVICALGIA: Status: ACTIVE | Noted: 2023-02-07

## 2023-02-07 PROBLEM — G47.00 INSOMNIA: Status: ACTIVE | Noted: 2023-02-07

## 2023-02-07 LAB
ALBUMIN SERPL BCG-MCNC: 4.5 G/DL (ref 3.5–5.2)
ALP SERPL-CCNC: 48 U/L (ref 35–104)
ALT SERPL W P-5'-P-CCNC: 20 U/L (ref 10–35)
ANION GAP SERPL CALCULATED.3IONS-SCNC: 10 MMOL/L (ref 7–15)
AST SERPL W P-5'-P-CCNC: 27 U/L (ref 10–35)
BASOPHILS # BLD AUTO: 0 10E3/UL (ref 0–0.2)
BASOPHILS NFR BLD AUTO: 1 %
BILIRUB SERPL-MCNC: 0.6 MG/DL
BUN SERPL-MCNC: 7.4 MG/DL (ref 8–23)
CALCIUM SERPL-MCNC: 9.5 MG/DL (ref 8.8–10.2)
CHLORIDE SERPL-SCNC: 102 MMOL/L (ref 98–107)
CHOLEST SERPL-MCNC: 236 MG/DL
CREAT SERPL-MCNC: 0.72 MG/DL (ref 0.51–0.95)
DEPRECATED HCO3 PLAS-SCNC: 27 MMOL/L (ref 22–29)
EOSINOPHIL # BLD AUTO: 0 10E3/UL (ref 0–0.7)
EOSINOPHIL NFR BLD AUTO: 0 %
ERYTHROCYTE [DISTWIDTH] IN BLOOD BY AUTOMATED COUNT: 12 % (ref 10–15)
GFR SERPL CREATININE-BSD FRML MDRD: >90 ML/MIN/1.73M2
GLUCOSE SERPL-MCNC: 99 MG/DL (ref 70–99)
HCT VFR BLD AUTO: 38.4 % (ref 35–47)
HDLC SERPL-MCNC: 93 MG/DL
HGB BLD-MCNC: 13.3 G/DL (ref 11.7–15.7)
IMM GRANULOCYTES # BLD: 0 10E3/UL
IMM GRANULOCYTES NFR BLD: 0 %
LDLC SERPL CALC-MCNC: 127 MG/DL
LYMPHOCYTES # BLD AUTO: 1.4 10E3/UL (ref 0.8–5.3)
LYMPHOCYTES NFR BLD AUTO: 43 %
MCH RBC QN AUTO: 31 PG (ref 26.5–33)
MCHC RBC AUTO-ENTMCNC: 34.6 G/DL (ref 31.5–36.5)
MCV RBC AUTO: 90 FL (ref 78–100)
MONOCYTES # BLD AUTO: 0.2 10E3/UL (ref 0–1.3)
MONOCYTES NFR BLD AUTO: 8 %
NEUTROPHILS # BLD AUTO: 1.5 10E3/UL (ref 1.6–8.3)
NEUTROPHILS NFR BLD AUTO: 48 %
NONHDLC SERPL-MCNC: 143 MG/DL
PLATELET # BLD AUTO: 220 10E3/UL (ref 150–450)
POTASSIUM SERPL-SCNC: 4.5 MMOL/L (ref 3.4–5.3)
PROT SERPL-MCNC: 7 G/DL (ref 6.4–8.3)
RBC # BLD AUTO: 4.29 10E6/UL (ref 3.8–5.2)
SODIUM SERPL-SCNC: 139 MMOL/L (ref 136–145)
TRIGL SERPL-MCNC: 78 MG/DL
WBC # BLD AUTO: 3.2 10E3/UL (ref 4–11)

## 2023-02-07 PROCEDURE — 36415 COLL VENOUS BLD VENIPUNCTURE: CPT | Performed by: INTERNAL MEDICINE

## 2023-02-07 PROCEDURE — 80053 COMPREHEN METABOLIC PANEL: CPT | Performed by: INTERNAL MEDICINE

## 2023-02-07 PROCEDURE — G0438 PPPS, INITIAL VISIT: HCPCS | Performed by: INTERNAL MEDICINE

## 2023-02-07 PROCEDURE — 85025 COMPLETE CBC W/AUTO DIFF WBC: CPT | Performed by: INTERNAL MEDICINE

## 2023-02-07 PROCEDURE — 99214 OFFICE O/P EST MOD 30 MIN: CPT | Mod: 25 | Performed by: INTERNAL MEDICINE

## 2023-02-07 PROCEDURE — 80061 LIPID PANEL: CPT | Performed by: INTERNAL MEDICINE

## 2023-02-07 RX ORDER — AMLODIPINE BESYLATE 10 MG/1
10 TABLET ORAL DAILY
Qty: 90 TABLET | Refills: 3 | Status: SHIPPED | OUTPATIENT
Start: 2023-02-07 | End: 2024-01-03

## 2023-02-07 ASSESSMENT — ENCOUNTER SYMPTOMS
CHILLS: 0
BREAST MASS: 0
SHORTNESS OF BREATH: 0
HEMATURIA: 0
ARTHRALGIAS: 0
COUGH: 0
FREQUENCY: 0
SORE THROAT: 0
ABDOMINAL PAIN: 0
JOINT SWELLING: 0
CONSTIPATION: 0
EYE PAIN: 0
HEMATOCHEZIA: 0
DIZZINESS: 0
NAUSEA: 0
HEADACHES: 0
PALPITATIONS: 0
PARESTHESIAS: 1
WEAKNESS: 0
DYSURIA: 0
MYALGIAS: 0
NERVOUS/ANXIOUS: 1
DIARRHEA: 0
HEARTBURN: 0
FEVER: 0

## 2023-02-07 ASSESSMENT — ACTIVITIES OF DAILY LIVING (ADL): CURRENT_FUNCTION: NO ASSISTANCE NEEDED

## 2023-02-07 NOTE — ASSESSMENT & PLAN NOTE
Patient has longstanding Raynaud's that is more tree skiing.  She is still having pain in her fingertips on amlodipine 5 mg daily.  -Per rheumatology recommendations we will try increasing amlodipine to 10 mg daily

## 2023-02-07 NOTE — ASSESSMENT & PLAN NOTE
Another longstanding issue for patient.  She has used Xanax and temazepam in the past.  Right now she is falling asleep just fine and staying asleep for 2 to 4 hours, however will sometimes wake up and be unable to fall asleep after those few hours.  Feels very tired and foggy throughout the day because of this lack of sleep.  She is currently using Xanax 0.25 mg as needed.  She is unsure if she is having good nights of sleep when using the Xanax or without.  -We will have her continue to use current dose of Xanax as needed and keep a diary noting things that surround nights which she has good and bad sleep  -Follow-up in 1 month via virtual visit  -If good sleep coincides with Xanax we can continue it, if not will consider other options for insomnia  -Continue her good work on sleep hygiene

## 2023-02-07 NOTE — PATIENT INSTRUCTIONS
Get shingles (Shingrix) and pneumonia vaccine (PCV-20) at your pharmacy.     Keep a journal for your sleep over the next month. We can then see if your better nights are with or without the xanax. Take 0.25 mg at night as needed, no more than 3 nights in a row. Aim for 3-5 nights per week.     Patient Education   Personalized Prevention Plan  You are due for the preventive services outlined below.  Your care team is available to assist you in scheduling these services.  If you have already completed any of these items, please share that information with your care team to update in your medical record.  Health Maintenance Due   Topic Date Due    ANNUAL REVIEW OF HM ORDERS  Never done    Zoster (Shingles) Vaccine (1 of 2) Never done    Pneumococcal Vaccine (1 - PCV) Never done    Annual Wellness Visit  01/28/2021     Your Health Risk Assessment indicates you feel you are not in good emotional health.    Recreation   Recreation is not limited to sports and team events. It includes any activity that provides relaxation, interest, enjoyment, and exercise. Recreation provides an outlet for physical, mental, and social energy. It can give a sense of worth and achievement. It can help you stay healthy.    Mental Exercise and Social Involvement  Mental and emotional health is as important as physical health. Keep in touch with friends and family. Stay as active as possible. Continue to learn and challenge yourself.   Things you can do to stay mentally active are:  Learn something new, like a foreign language or musical instrument.   Play SCRABBLE or do crossword puzzles. If you cannot find people to play these games with you at home, you can play them with others on your computer through the Internet.   Join a games club--anything from card games to chess or checkers or lawn bowling.   Start a new hobby.   Go back to school.   Volunteer.   Read.   Keep up with world events.

## 2023-02-07 NOTE — ASSESSMENT & PLAN NOTE
Previously seen at [place] and follows with [facility] here.  Symptoms currently well controlled.    -Continue omeprazole 20 BID and budesonide 1.5 mg BID

## 2023-02-07 NOTE — ASSESSMENT & PLAN NOTE
We discussed healthy lifestyle, nutrition, cardiovascular risk reduction, self care, safety, sunscreen, and timing of cancer screening.  Health maintenance screening and immunizations reviewed with the patient.   - Labs today  - Due for repeat DEXA, ordered  - Mammogram due in May ordered  - She will get pneumonia and shingles vaccines at her pharmacy

## 2023-02-07 NOTE — ASSESSMENT & PLAN NOTE
Leads to dyspareunia.  She is taking estradiol 3 times weekly with some improvement but not all the way better.  Patient plans to follow-up with GYN to discuss other options which I agree with.  -Continue estradiol for now

## 2023-02-07 NOTE — PROGRESS NOTES
"SUBJECTIVE:   Katty is a 68 year old who presents for Preventive Visit.  is retired radiologist. Son is going to be completing GI fellowship this year and then will be working at [employer] [place]     Healthy Habits:     In general, how would you rate your overall health?  Good    Frequency of exercise:  4-5 days/week    Duration of exercise:  15-30 minutes    Do you usually eat at least 4 servings of fruit and vegetables a day, include whole grains    & fiber and avoid regularly eating high fat or \"junk\" foods?  Yes    Medication side effects:  Other    Ability to successfully perform activities of daily living:  No assistance needed    Home Safety:  No safety concerns identified    Hearing Impairment:  No hearing concerns    In the past 6 months, have you been bothered by leaking of urine?  No    In general, how would you rate your overall mental or emotional health?  Fair      PHQ-2 Total Score: 2    Additional concerns today:  Yes    Saw rheum 4/20/22 for Chilblains and Raynauds: Longstanding history of Raynaud's dating back to 40s and 50s.  Uses amlodipine 5 during the winter months. Still having cold/white fingertips while skiing.     Insomnia: Over the years has been an issue with periods of more issues. In last 6 months able to fall asleep no problem, then will stay asleep for 2-4 hours and then waking up and not able to fall back asleep. Does breathing and counting and sometimes this works but not always. Has used temazepam and alprazolam. One good night with temazepam but after that didn't work. So was switched back to Xanax 0.25 mg at bedtime PRN in November. Unsure if better nights are with the xanax or not. Feeling like she is not firing on all cylinders.     Dyspepsia/EOE: Omeprazole and budesonide. Was seen at [place] (Alexis Nieto), here Dr. Alexis Pugh with [facility].     Atrophic vaginitis: Causing discomfort and pain with intercourse, years. Estradiol TID. Thinking about seeing GYN again.     Memory Issues: " Noticing with worse sleep that she forgets conversations, where she placed things, etc.     Cross country skis, bikes and walks for exercise.     Have you ever done Advance Care Planning? (For example, a Health Directive, POLST, or a discussion with a medical provider or your loved ones about your wishes): Yes, advance care planning is on file.     Fall risk  Fallen 2 or more times in the past year?: No  Any fall with injury in the past year?: No  Cognitive Screening   1) Repeat 3 items (Leader, Season, Table)    2) Clock draw: NORMAL  3) 3 item recall: Recalls 3 objects  Results: 3 items recalled: COGNITIVE IMPAIRMENT LESS LIKELY    Mini-CogTM Copyright S Ava. Licensed by the author for use in [facility]; reprinted with permission ([email or web address]). All rights reserved.      Reviewed and updated as needed this visit by clinical staff   Tobacco  Allergies  Meds  Problems  Med Hx  Surg Hx  Fam Hx          Reviewed and updated as needed this visit by Provider   Tobacco  Allergies  Meds  Problems  Med Hx  Surg Hx  Fam Hx         Social History     Tobacco Use     Smoking status: Never     Smokeless tobacco: Never   Substance Use Topics     Alcohol use: Yes     Comment: Alcoholic Drinks/day: occ       Alcohol Use 2/6/2023   Prescreen: >3 drinks/day or >7 drinks/week? No       Current providers sharing in care for this patient include:   Patient Care Team:  Manasa Watson MD as PCP - General  Isacc Gill MD as MD [place], Kitty Cobb MD as MD (Ophthalmology)  Radha Spence MD as Assigned PCP    The following health maintenance items are reviewed in Epic and correct as of today:  Health Maintenance   Topic Date Due     ANNUAL REVIEW OF HM ORDERS  Never done     ZOSTER IMMUNIZATION (1 of 2) Never done     Pneumococcal Vaccine: 65+ Years (1 - PCV) Never done     MEDICARE ANNUAL WELLNESS VISIT  01/28/2021     MAMMO SCREENING  05/10/2023     FALL RISK ASSESSMENT  02/07/2024  "    COLORECTAL CANCER SCREENING  07/14/2025     DTAP/TDAP/TD IMMUNIZATION (2 - Td or Tdap) 10/31/2026     LIPID  02/07/2028     ADVANCE CARE PLANNING  02/07/2028     DEXA  10/31/2031     HEPATITIS C SCREENING  Completed     PHQ-2 (once per calendar year)  Completed     INFLUENZA VACCINE  Completed     COVID-19 Vaccine  Completed     IPV IMMUNIZATION  Aged Out     MENINGITIS IMMUNIZATION  Aged Out     FHS-7: No flowsheet data found.    Mammogram Screening: Recommended mammography every 1-2 years with patient discussion and risk factor consideration  Pertinent mammograms are reviewed under the imaging tab.    Review of Systems   Constitutional: Negative for chills and fever.   HENT: Negative for congestion, ear pain, hearing loss and sore throat.    Eyes: Negative for pain and visual disturbance.   Respiratory: Negative for cough and shortness of breath.    Cardiovascular: Negative for chest pain, palpitations and peripheral edema.   Gastrointestinal: Negative for abdominal pain, constipation, diarrhea, heartburn, hematochezia and nausea.   Breasts:  Negative for tenderness, breast mass and discharge.   Genitourinary: Negative for dysuria, frequency, genital sores, hematuria, pelvic pain, urgency, vaginal bleeding and vaginal discharge.   Musculoskeletal: Negative for arthralgias, joint swelling and myalgias.   Skin: Negative for rash.   Neurological: Positive for paresthesias. Negative for dizziness, weakness and headaches.   Psychiatric/Behavioral: Negative for mood changes. The patient is nervous/anxious.        OBJECTIVE:   /68 (BP Location: Right arm, Patient Position: Sitting, Cuff Size: Adult Regular)   Pulse 67   Resp 20   Ht 1.676 m (5' 6\")   Wt 50 kg (110 lb 4.8 oz)   SpO2 100%   BMI 17.80 kg/m   Estimated body mass index is 17.8 kg/m  as calculated from the following:    Height as of this encounter: 1.676 m (5' 6\").    Weight as of this encounter: 50 kg (110 lb 4.8 oz).  Physical Exam  GENERAL: " healthy, thin woman, alert and no distress  EYES: Eyes grossly normal to inspection and conjunctivae and sclerae normal  HENT: nose and mouth without ulcers or lesions  NECK: no adenopathy, no asymmetry, masses, or scars and thyroid normal to palpation  RESP: lungs clear to auscultation - no rales, rhonchi or wheezes  CV: regular rate and rhythm, normal S1 S2, no S3 or S4, no murmur, click or rub, no peripheral edema and peripheral pulses strong  ABDOMEN: soft, nontender, no hepatosplenomegaly, no masses and bowel sounds normal  MS: no gross musculoskeletal defects noted, no edema  SKIN: no suspicious lesions or rashes on exposed skin   NEURO: Normal strength and tone, mentation intact and speech normal  PSYCH: mentation appears normal, affect normal/bright    Diagnostic Test Results:  Labs reviewed in Epic    ASSESSMENT / PLAN:     Problem List Items Addressed This Visit        Digestive    Eosinophilic esophagitis     Previously seen at [place] and follows with [facility] here.  Symptoms currently well controlled.    -Continue omeprazole 20 BID and budesonide 1.5 mg BID            Circulatory    Raynaud's syndrome without gangrene     Patient has longstanding Raynaud's that is more tree skiing.  She is still having pain in her fingertips on amlodipine 5 mg daily.  -Per rheumatology recommendations we will try increasing amlodipine to 10 mg daily         Relevant Medications    amLODIPine (NORVASC) 10 MG tablet       Urinary    Postmenopausal atrophic vaginitis     Leads to dyspareunia.  She is taking estradiol 3 times weekly with some improvement but not all the way better.  Patient plans to follow-up with GYN to discuss other options which I agree with.  -Continue estradiol for now            Other    Insomnia     Another longstanding issue for patient.  She has used Xanax and temazepam in the past.  Right now she is falling asleep just fine and staying asleep for 2 to 4 hours, however will sometimes wake up and be unable  to fall asleep after those few hours.  Feels very tired and foggy throughout the day because of this lack of sleep.  She is currently using Xanax 0.25 mg as needed.  She is unsure if she is having good nights of sleep when using the Xanax or without.  -We will have her continue to use current dose of Xanax as needed and keep a diary noting things that surround nights which she has good and bad sleep  -Follow-up in 1 month via virtual visit  -If good sleep coincides with Xanax we can continue it, if not will consider other options for insomnia  -Continue her good work on sleep hygiene         Encounter for Medicare annual wellness exam - Primary     We discussed healthy lifestyle, nutrition, cardiovascular risk reduction, self care, safety, sunscreen, and timing of cancer screening.  Health maintenance screening and immunizations reviewed with the patient.   - Labs today  - Due for repeat DEXA, ordered  - Mammogram due in May ordered  - She will get pneumonia and shingles vaccines at her pharmacy             Relevant Orders    Comprehensive metabolic panel (Completed)    CBC with platelets and differential (Completed)   Other Visit Diagnoses     Encounter for screening mammogram for breast cancer        Relevant Orders    *MA Screening Digital Bilateral    Asymptomatic menopause        Relevant Orders    DX Hip/Pelvis/Spine    Screening for hyperlipidemia        Relevant Orders    Lipid Profile (Chol, Trig, HDL, LDL calc) (Completed)          Patient has been advised of split billing requirements and indicates understanding: Yes      COUNSELING:  Reviewed preventive health counseling, as reflected in patient instructions  Special attention given to:       Regular exercise       Healthy diet/nutrition       Osteoporosis prevention/bone health   - Will get shingles and pneumonia vaccinations at her pharmacy.     She reports that she has never smoked. She has never used smokeless tobacco.    Appropriate preventive  services were discussed with this patient, including applicable screening as appropriate for cardiovascular disease, diabetes, osteopenia/osteoporosis, and glaucoma.  As appropriate for age/gender, discussed screening for colorectal cancer, prostate cancer, breast cancer, and cervical cancer. Checklist reviewing preventive services available has been given to the patient.    Reviewed patients plan of care and provided an AVS. The Basic Care Plan (routine screening as documented in Health Maintenance) for Katty meets the Care Plan requirement. This Care Plan has been established and reviewed with the Patient.      Manasa Watson MD  [facility]    Identified Health Risks:    The patient was provided with suggestions to help her develop a healthy emotional lifestyle.

## 2023-02-08 NOTE — RESULT ENCOUNTER NOTE
Your cholesterol is slightly higher than it has been previously but not by very much. When deciding to treat cholesterol, we take into account your numbers and your overall risk of having a heart attack and stroke, using the calculator below:   The 10-year ASCVD risk score (Liberty COBB, et al., 2019) is: 6.4%    Values used to calculate the score:      Age: 68 years      Sex: Female      Is Non- : No      Diabetic: No      Tobacco smoker: No      Systolic Blood Pressure: 122 mmHg      Is BP treated: No      HDL Cholesterol: 93 mg/dL      Total Cholesterol: 236 mg/dL    When your 10 year risk is between 5-7.5% (as yours is at 6.4%), we can so several things - continue watching and have you work on healthy lifestyle as you are right now and repeat next year, look further at your risk for heart attack with a CT scan to look at if you have calcifications of the arteries in your heart and use that to decide about medication or just start a medication to lower your cholesterol.    The testing of your blood sugar, kidney function, liver function and electrolytes was normal. Low BUN is not concerning. Your blood counts are stable, it looks like you have had intermittent low white blood cell counts in the past that were normal again with repeating.

## 2023-03-28 ENCOUNTER — HOSPITAL ENCOUNTER (OUTPATIENT)
Dept: BONE DENSITY | Facility: HOSPITAL | Age: 68
Discharge: HOME | End: 2023-03-28
Attending: INTERNAL MEDICINE
Payer: MEDICARE

## 2023-03-28 ENCOUNTER — ANCILLARY PROCEDURE (OUTPATIENT)
Dept: MAMMOGRAPHY | Facility: HOSPITAL | Age: 68
End: 2023-03-28
Attending: INTERNAL MEDICINE
Payer: MEDICARE

## 2023-03-28 DIAGNOSIS — Z78.0 ASYMPTOMATIC MENOPAUSE: ICD-10-CM

## 2023-03-28 DIAGNOSIS — Z12.31 ENCOUNTER FOR SCREENING MAMMOGRAM FOR BREAST CANCER: ICD-10-CM

## 2023-03-28 PROCEDURE — 77067 SCR MAMMO BI INCL CAD: CPT

## 2023-03-28 PROCEDURE — 77080 DXA BONE DENSITY AXIAL: CPT

## 2023-03-28 NOTE — RESULT ENCOUNTER NOTE
Your bone density does show that your bone density has lowered since it was last checked, so you are now in the range of osteopenia (which just means lower than normal bone mineral density). In this range, it is just important to do things to prevent progression to osteoporosis. You can do this by taking 4981-3626 international units of vitamin D3 and 1200 mg daily of calcium (citrate is best). Additionally, any weight bearing exercise, like walking, is great for bone health.

## 2023-03-29 ENCOUNTER — VIRTUAL VISIT (OUTPATIENT)
Dept: INTERNAL MEDICINE | Facility: CLINIC | Age: 68
End: 2023-03-29
Payer: MEDICARE

## 2023-03-29 DIAGNOSIS — K20.0 EOSINOPHILIC ESOPHAGITIS: ICD-10-CM

## 2023-03-29 DIAGNOSIS — F51.01 PRIMARY INSOMNIA: Primary | ICD-10-CM

## 2023-03-29 DIAGNOSIS — B37.0 ORAL THRUSH: ICD-10-CM

## 2023-03-29 DIAGNOSIS — R41.3 MEMORY CHANGES: ICD-10-CM

## 2023-03-29 PROCEDURE — 99214 OFFICE O/P EST MOD 30 MIN: CPT | Mod: VID | Performed by: INTERNAL MEDICINE

## 2023-03-29 RX ORDER — NYSTATIN 100000/ML
500000 SUSPENSION, ORAL (FINAL DOSE FORM) ORAL 4 TIMES DAILY
Qty: 140 ML | Refills: 0 | Status: SHIPPED | OUTPATIENT
Start: 2023-03-29 | End: 2023-04-05

## 2023-03-29 RX ORDER — TRAZODONE HYDROCHLORIDE 50 MG/1
50 TABLET, FILM COATED ORAL AT BEDTIME
Qty: 30 TABLET | Refills: 1 | Status: SHIPPED | OUTPATIENT
Start: 2023-03-29 | End: 2023-08-08

## 2023-03-29 NOTE — PATIENT INSTRUCTIONS
For Insomnia:   - We will try trazodone 50 mg at night, try this first when you get it and see how you are able to sleep through the night  - Okay to still use xanax 0.25 mg at night, don't take same night if you use trazodone  - Okay to use xanax for a few nights and then trazodone for a few nights, but if you do this, try to take note of sleep quality and how you feel the next day to compare the two     For thrush:   - Use nystatin swish and swallow four times daily for 7 days  - Can also try using a flonase nasal spray for the sinus drainage, that might help as well

## 2023-03-29 NOTE — PROGRESS NOTES
Katty is a 68 year old who is being evaluated via a billable video visit.      How would you like to obtain your AVS? MyChart  If the video visit is dropped, the invitation should be resent by: Text to cell phone: [phone]  Will anyone else be joining your video visit? No     Assessment & Plan   Problem List Items Addressed This Visit        Digestive    Eosinophilic esophagitis     Follows at [facility]. Suspect thrush 2/2 budesonide right now.   - Continue current regimen of omeprazole and budesonide BID, however instructed to work to avoid budesonide in oral cavity as much as possible         Oral thrush     Patient has h/o EOE, using budesonide 1.5 mg BID. Now has film on tongue with sour taste in mouth. Based on history and exam, suspect oral thrush.   - Nystatin swish and swallow QID x7 days         Relevant Medications    nystatin (MYCOSTATIN) 217649 UNIT/ML suspension       Other    Insomnia - Primary     Continues to struggle with insomnia, predominantly with sleep latency. Does fall back asleep more easily with use of xanax. She is using quite a small dose at 0.25 mg nightly, which is fine for now. Overall, would rather a different agent long term to avoid tolerance, etc.   - Try trazodone 50 mg at bedtime PRN  - Okay to continue xanax 0.25 mg at bedtime PRN, not to use same night as using trazodone  - F/u via VV in 4-6 weeks to discuss how sleep did with trazodone vs xanax. Next agent to try would be low dose doxepin         Relevant Medications    traZODone (DESYREL) 50 MG tablet    Other Relevant Orders    PRIMARY CARE FOLLOW-UP SCHEDULING    Memory changes     Patient has noted issues with short term memory recently. Unsure if it is related to sleep or something more.   - Consider neuropsych/cognitive testing in the future             Prescription drug management  I spent a total of 39 minutes on the day of the visit.   Time spent by me doing chart review, history and exam, documentation and further  activities per the note    Manasa Watson MD  [facility] Alaniz is a 68 year old, presenting for the following health issues:  Follow Up and tongue problem    Additional Questions 3/29/2023   Roomed by Cherri RODGERS CMA   Accompanied by N/A   History of Present Illness     Reason for visit:  Insomnia    Insomnia:   At our last visit, was going to continue xanax use but take note of sleep with and without to determine how much it was or was not helping. Today, she says that in general what she is finding is that when she takes the xanax she sleeps longer and is able to get back to sleep in a reasonable amount of time. 1/2 tablet helps some but has difficulty falling back asleep. With full tablet falls asleep faster if she wakes up in the middle of the night.     Rarely struggles falling asleep, usually will fall asleep within 5-10 minutes, even without Xanax. More of an issue is falling back to sleep, which gos better when using Xanax. Has only tried temazepam in the past. No other medications for insomnia.     Memory Loss:   Forgetting details in series on TV. More short term things she forgets conversations. Still able to manage finances, etc. But feels her ability to find words in conversation and remember details is going down.     Tongue:   Over last 3-4 weeks has noted film on tongue, gray/dark in color. Always there but can be scraped off with her finger. Leads to a sour taste in her mouth and food tastes different. More sinus drainage over same time course. Continues to use budesonide 1.5 mg BID for EOE, takes in syrup so it does get into mouth and not just into throat. Also taking prilosec 20 mg BID.     She eats 2-3 servings of fruits and vegetables daily.She consumes 0 sweetened beverage(s) daily.She exercises with enough effort to increase her heart rate 20 to 29 minutes per day.  She exercises with enough effort to increase her heart rate 5 days per week.    She is taking medications regularly.     Review of Systems   Constitutional, HEENT, cardiovascular, pulmonary, GI, , musculoskeletal, neuro, skin, endocrine and psych systems are negative, except as otherwise noted.      Objective    Vitals - Patient Reported  Weight (Patient Reported): 52.2 kg (115 lb)      Vitals:  No vitals were obtained today due to virtual visit.    Physical Exam   GENERAL: Healthy, alert and no distress  MOUTH: Grayish film present on entire tongue  EYES: Eyes grossly normal to inspection.  No discharge or erythema, or obvious scleral/conjunctival abnormalities.  RESP: No audible wheeze, cough, or visible cyanosis.  No visible retractions or increased work of breathing.    SKIN: Visible skin clear. No significant rash, abnormal pigmentation or lesions.  NEURO: Cranial nerves grossly intact.  Mentation and speech appropriate for age.  PSYCH: Mentation appears normal, affect normal/bright, judgement and insight intact, normal speech and appearance well-groomed.          Video-Visit Details    Type of service:  Video Visit   Video Start Time: 9:07  Video End Time:9:32    Originating Location (pt. Location): Home  Distant Location (provider location):  On-site  Platform used for Video Visit: [employer]

## 2023-03-29 NOTE — ASSESSMENT & PLAN NOTE
Continues to struggle with insomnia, predominantly with sleep latency. Does fall back asleep more easily with use of xanax. She is using quite a small dose at 0.25 mg nightly, which is fine for now. Overall, would rather a different agent long term to avoid tolerance, etc.   - Try trazodone 50 mg at bedtime PRN  - Okay to continue xanax 0.25 mg at bedtime PRN, not to use same night as using trazodone  - F/u via VV in 4-6 weeks to discuss how sleep did with trazodone vs xanax. Next agent to try would be low dose doxepin

## 2023-03-29 NOTE — ASSESSMENT & PLAN NOTE
Follows at [facility]. Suspect thrush 2/2 budesonide right now.   - Continue current regimen of omeprazole and budesonide BID, however instructed to work to avoid budesonide in oral cavity as much as possible

## 2023-03-29 NOTE — ASSESSMENT & PLAN NOTE
Patient has noted issues with short term memory recently. Unsure if it is related to sleep or something more.   - Consider neuropsych/cognitive testing in the future

## 2023-03-29 NOTE — ASSESSMENT & PLAN NOTE
Patient has h/o EOE, using budesonide 1.5 mg BID. Now has film on tongue with sour taste in mouth. Based on history and exam, suspect oral thrush.   - Nystatin swish and swallow QID x7 days

## 2023-05-16 ENCOUNTER — VIRTUAL VISIT (OUTPATIENT)
Dept: INTERNAL MEDICINE | Facility: CLINIC | Age: 68
End: 2023-05-16
Attending: INTERNAL MEDICINE
Payer: MEDICARE

## 2023-05-16 DIAGNOSIS — K20.0 EOSINOPHILIC ESOPHAGITIS: Primary | ICD-10-CM

## 2023-05-16 DIAGNOSIS — R41.3 MEMORY CHANGES: ICD-10-CM

## 2023-05-16 DIAGNOSIS — F51.01 PRIMARY INSOMNIA: ICD-10-CM

## 2023-05-16 PROCEDURE — 99214 OFFICE O/P EST MOD 30 MIN: CPT | Mod: VID | Performed by: INTERNAL MEDICINE

## 2023-05-16 RX ORDER — OMEPRAZOLE 40 MG/1
40 CAPSULE, DELAYED RELEASE ORAL DAILY
Qty: 90 CAPSULE | Refills: 1 | Status: SHIPPED | OUTPATIENT
Start: 2023-05-16 | End: 2024-01-24

## 2023-05-16 NOTE — ASSESSMENT & PLAN NOTE
Follows at [facility].  I wonder if this sour taste in her mouth is a little bit of worsened esophagitis and reflux.  -Continue budesonide twice daily  -Increase omeprazole from 20 to 40 mg daily

## 2023-05-16 NOTE — ASSESSMENT & PLAN NOTE
Patient continues to note issues with short-term memory.  Despite better sleep she is still struggling.  -Neuropsych referral was placed for cognitive testing.

## 2023-05-16 NOTE — ASSESSMENT & PLAN NOTE
Insomnia is slightly better, predominantly does struggle with sleep latency.  We are going to work on trying off of Xanax just to avoid long-term benzodiazepine use.  -Try trazodone 50 mg at bedtime PRN at least 2 nights a week  -Other nights will continue Xanax 0.25 mg at bedtime PRN, not same night as trazodone  -F/u in 8 weeks

## 2023-05-16 NOTE — PROGRESS NOTES
Katty is a 68 year old who is being evaluated via a billable video visit.      How would you like to obtain your AVS? MyChart  If the video visit is dropped, the invitation should be resent by: Text to cell phone: [phone]  Will anyone else be joining your video visit? No    Assessment & Plan   Problem List Items Addressed This Visit        Digestive    Eosinophilic esophagitis - Primary     Follows at [facility].  I wonder if this sour taste in her mouth is a little bit of worsened esophagitis and reflux.  -Continue budesonide twice daily  -Increase omeprazole from 20 to 40 mg daily         Relevant Medications    omeprazole (PRILOSEC) 40 MG DR capsule       Other    Insomnia     Insomnia is slightly better, predominantly does struggle with sleep latency.  We are going to work on trying off of Xanax just to avoid long-term benzodiazepine use.  -Try trazodone 50 mg at bedtime PRN at least 2 nights a week  -Other nights will continue Xanax 0.25 mg at bedtime PRN, not same night as trazodone  -F/u in 8 weeks          Memory changes     Patient continues to note issues with short-term memory.  Despite better sleep she is still struggling.  -Neuropsych referral was placed for cognitive testing.         Relevant Orders    Adult Neuropsychology Referral      Prescription drug management  No LOS data to display   Time spent by me doing chart review, history and exam, documentation and further activities per the note     FUTURE APPOINTMENTS:       - Follow-up visit in 8 weeks    Manasa Watson MD  [facility]   Katty is a 68 year old, presenting for the following health issues:  RECHECK (insomnia)        3/29/2023     8:50 AM   Additional Questions   Roomed by Cherri RODGERS CMA   Accompanied by N/A     History of Present Illness       Reason for visit:  Insomnia, Sense of taste/smell    She eats 4 or more servings of fruits and vegetables daily.She consumes 0 sweetened  "beverage(s) daily.She exercises with enough effort to increase her heart rate 20 to 29 minutes per day.  She exercises with enough effort to increase her heart rate 6 days per week.   She is taking medications regularly.      Insomnia: Sleep is better. Has not tried trazodone. Sleeping more longer periods, 5 hour stretches. Still a little groggy in the morning but also improving. 0.25 mg at bedtime, sometimes even a tab but   - Breathing techniques helping get back to sleep.     EOE / thrush: Still sour taste in her mouth. Gray on tongue got better with nystatin.     Memory Issues: Still struggling with some tasks and is needing her 's help to organize things.  She has started to try to focus on 1 thing at a time but still is having issues even with better sleep as above.  Wondering if it is time to get some cognitive testing done.    Grandson's first birthday is this weekend.    Review of Systems   Constitutional, HEENT, cardiovascular, pulmonary, gi and gu systems are negative, except as otherwise noted.      Objective    Vitals - Patient Reported  Weight (Patient Reported): 49.9 kg (110 lb)  Height (Patient Reported): 171.5 cm (5' 7.5\")  BMI (Based on Pt Reported Ht/Wt): 16.97    Physical Exam   GENERAL: Healthy, alert and no distress  EYES: Eyes grossly normal to inspection.  No discharge or erythema, or obvious scleral/conjunctival abnormalities.  RESP: No audible wheeze, cough, or visible cyanosis.  No visible retractions or increased work of breathing.    SKIN: Visible skin clear. No significant rash, abnormal pigmentation or lesions.  NEURO: Cranial nerves grossly intact.  Mentation and speech appropriate for age.  PSYCH: Mentation appears normal, affect normal/bright, judgement and insight intact, normal speech and appearance well-groomed.      Video-Visit Details    Type of service:  Video Visit   Video Start Time: 5:28  Video End Time:5:45    Originating Location (pt. Location): Home  Distant " Location (provider location):  On-site  Platform used for Video Visit: John

## 2023-05-17 DIAGNOSIS — L63.0 ALOPECIA TOTALIS: Primary | ICD-10-CM

## 2023-05-17 DIAGNOSIS — L64.8 OTHER ANDROGENIC ALOPECIA: ICD-10-CM

## 2023-05-17 DIAGNOSIS — L85.3 ASTEATOSIS CUTIS: ICD-10-CM

## 2023-05-17 DIAGNOSIS — R20.2 PARESTHESIA: ICD-10-CM

## 2023-05-22 ENCOUNTER — MYC MEDICAL ADVICE (OUTPATIENT)
Dept: INTERNAL MEDICINE | Facility: CLINIC | Age: 68
End: 2023-05-22
Payer: MEDICARE

## 2023-05-22 DIAGNOSIS — F51.01 PRIMARY INSOMNIA: ICD-10-CM

## 2023-05-22 RX ORDER — ALPRAZOLAM 0.25 MG
0.25 TABLET ORAL
Qty: 30 TABLET | Refills: 3 | Status: SHIPPED | OUTPATIENT
Start: 2023-05-22 | End: 2023-10-17

## 2023-05-30 ENCOUNTER — LAB (OUTPATIENT)
Dept: LAB | Facility: CLINIC | Age: 68
End: 2023-05-30
Payer: MEDICARE

## 2023-05-30 DIAGNOSIS — L63.0 ALOPECIA TOTALIS: ICD-10-CM

## 2023-05-30 LAB
DEPRECATED CALCIDIOL+CALCIFEROL SERPL-MC: 39 UG/L (ref 20–75)
ERYTHROCYTE [DISTWIDTH] IN BLOOD BY AUTOMATED COUNT: 11.9 % (ref 10–15)
FERRITIN SERPL-MCNC: 28 NG/ML (ref 11–328)
HCT VFR BLD AUTO: 39.6 % (ref 35–47)
HGB BLD-MCNC: 13.7 G/DL (ref 11.7–15.7)
MCH RBC QN AUTO: 30.6 PG (ref 26.5–33)
MCHC RBC AUTO-ENTMCNC: 34.6 G/DL (ref 31.5–36.5)
MCV RBC AUTO: 88 FL (ref 78–100)
PLATELET # BLD AUTO: 247 10E3/UL (ref 150–450)
RBC # BLD AUTO: 4.48 10E6/UL (ref 3.8–5.2)
TSH SERPL DL<=0.005 MIU/L-ACNC: 1.34 UIU/ML (ref 0.3–4.2)
WBC # BLD AUTO: 3.6 10E3/UL (ref 4–11)

## 2023-05-30 PROCEDURE — 36415 COLL VENOUS BLD VENIPUNCTURE: CPT | Mod: GA

## 2023-05-30 PROCEDURE — 84630 ASSAY OF ZINC: CPT | Mod: 90

## 2023-05-30 PROCEDURE — 84443 ASSAY THYROID STIM HORMONE: CPT

## 2023-05-30 PROCEDURE — 85027 COMPLETE CBC AUTOMATED: CPT

## 2023-05-30 PROCEDURE — 99000 SPECIMEN HANDLING OFFICE-LAB: CPT | Mod: GA

## 2023-05-30 PROCEDURE — 82728 ASSAY OF FERRITIN: CPT

## 2023-05-30 PROCEDURE — 82306 VITAMIN D 25 HYDROXY: CPT | Mod: GZ

## 2023-06-01 LAB — ZINC SERPL-MCNC: 72.2 UG/DL

## 2023-06-04 ENCOUNTER — HEALTH MAINTENANCE LETTER (OUTPATIENT)
Age: 68
End: 2023-06-04

## 2023-07-11 ENCOUNTER — VIRTUAL VISIT (OUTPATIENT)
Dept: INTERNAL MEDICINE | Facility: CLINIC | Age: 68
End: 2023-07-11
Payer: MEDICARE

## 2023-07-11 DIAGNOSIS — F51.01 PRIMARY INSOMNIA: Primary | ICD-10-CM

## 2023-07-11 DIAGNOSIS — R41.3 MEMORY CHANGES: ICD-10-CM

## 2023-07-11 DIAGNOSIS — K20.0 EOSINOPHILIC ESOPHAGITIS: ICD-10-CM

## 2023-07-11 PROBLEM — G45.9 TRANSIENT ISCHEMIC ATTACK: Status: RESOLVED | Noted: 2023-02-07 | Resolved: 2023-07-11

## 2023-07-11 PROCEDURE — 99214 OFFICE O/P EST MOD 30 MIN: CPT | Mod: VID | Performed by: INTERNAL MEDICINE

## 2023-07-11 NOTE — PATIENT INSTRUCTIONS
Increase prilosec to 20 mg twice a day.     Can use flonase nasal spray at night time.     Only use xanax for sleep, no temazepam or trazodone.     You will get a call to schedule the sleep appointment.

## 2023-07-11 NOTE — PROGRESS NOTES
Katty is a 68 year old who is being evaluated via a billable video visit.      How would you like to obtain your AVS? MyChart  If the video visit is dropped, the invitation should be resent by: Text to cell phone: [phone]  Will anyone else be joining your video visit? No    Assessment & Plan   Problem List Items Addressed This Visit        Digestive    Eosinophilic esophagitis     Continues to have sour taste in her mouth, did not increase omeprazole after our last visit. I do wonder if this is worsening of esophagitis or gastritis.   - Increase omeprazole to 20 mg BID  - Continue budesonide 1.5 mg BID  - F/u in person in 3-4 weeks  - If no improvement with increased omeprazole, likely send back to [facility]            Other    Insomnia - Primary     Insomnia slightly better, only using medications 2-4 nights weekly, which is better than before.   - Okay to continue Xanax 0.25 mg at bedtime PRN  - Sleep referral given poor sleep even with medications         Relevant Orders    Adult Sleep Eval & Management  Referral    Memory changes     Continues to struggle with short-term memory.   - Neuropsych testing is scheduled 9/20/23           Prescription drug management  I spent a total of 35 minutes on the day of the visit.   Time spent by me doing chart review, history and exam, documentation and further activities per the note     FUTURE APPOINTMENTS:       - Follow-up visit in 3-4 weeks    Manasa Watson MD  [facility]   Katty is a 68 year old, presenting for the following health issues:  Follow Up (Insomnia follow up )        7/11/2023     5:14 PM   Additional Questions   Roomed by Sera     History of Present Illness     Reason for visit:  Follow-up insomnia and memory changes. Loss of taste.  Ears plugged.    Insomnia: Says she thinks this is moderately better, sleeping a little more but not really feeling more rested.   - Using temazepam or xanax  "2-4 nights in a row and then nothing the other nights  - She can't say she notices a distinct pattern with medication vs without but is able to sleep through the night better and can get back to sleep more easily with medications  - Trazodone made her feel too groggy, even in the middle of the night and weird dreams   - Using the bathroom 1-2 times or not. Will sleep 3-4 hours before using the bathroom and then 2-4 hours after.   - Nap in afternoon sometimes     EOE: Still have sour salty taste in her mouth and increases when she eats. Right now taking omeprazole 20 mg once daily.     Memory changes: Neuropsych appt scheduled for 9/20/23. Thinks these changes are about the same.     She eats 4 or more servings of fruits and vegetables daily.She consumes 0 sweetened beverage(s) daily.She exercises with enough effort to increase her heart rate 20 to 29 minutes per day.  She exercises with enough effort to increase her heart rate 6 days per week.   She is taking medications regularly.      Review of Systems   Constitutional, HEENT, cardiovascular, pulmonary, gi and gu systems are negative, except as otherwise noted.      Objective    Vitals - Patient Reported  Weight (Patient Reported): 49.9 kg (110 lb)  Height (Patient Reported): 170.2 cm (5' 7\")  BMI (Based on Pt Reported Ht/Wt): 17.23        Physical Exam   GENERAL: Healthy, alert and no distress  EYES: Eyes grossly normal to inspection.  No discharge or erythema, or obvious scleral/conjunctival abnormalities.  RESP: No audible wheeze, cough, or visible cyanosis.  No visible retractions or increased work of breathing.    SKIN: Visible skin clear. No significant rash, abnormal pigmentation or lesions.  NEURO: Cranial nerves grossly intact.  Mentation and speech appropriate for age.  PSYCH: Mentation appears normal, affect normal/bright, judgement and insight intact, normal speech and appearance well-groomed.          Video-Visit Details    Type of service:  Video " Visit   Video Start Time: 5:34  Video End Time:5:57    Originating Location (pt. Location): Home  Distant Location (provider location):  On-site  Platform used for Video Visit: John    Answers for HPI/ROS submitted by the patient on 7/9/2023  What is the reason for your visit today? : Followup insomnia and memory changes. Loss of taste.  Ears plugged.  How many servings of fruits and vegetables do you eat daily?: 4 or more  On average, how many sweetened beverages do you drink each day (Examples: soda, juice, sweet tea, etc.  Do NOT count diet or artificially sweetened beverages)?: 0  How many minutes a day do you exercise enough to make your heart beat faster?: 20 to 29  How many days a week do you exercise enough to make your heart beat faster?: 6  How many days per week do you miss taking your medication?: 0

## 2023-07-12 NOTE — ASSESSMENT & PLAN NOTE
Insomnia slightly better, only using medications 2-4 nights weekly, which is better than before.   - Okay to continue Xanax 0.25 mg at bedtime PRN  - Sleep referral given poor sleep even with medications

## 2023-07-12 NOTE — ASSESSMENT & PLAN NOTE
Continues to have sour taste in her mouth, did not increase omeprazole after our last visit. I do wonder if this is worsening of esophagitis or gastritis.   - Increase omeprazole to 20 mg BID  - Continue budesonide 1.5 mg BID  - F/u in person in 3-4 weeks  - If no improvement with increased omeprazole, likely send back to [facility]

## 2023-08-08 ENCOUNTER — OFFICE VISIT (OUTPATIENT)
Dept: INTERNAL MEDICINE | Facility: CLINIC | Age: 68
End: 2023-08-08
Payer: MEDICARE

## 2023-08-08 VITALS
WEIGHT: 108.8 LBS | HEIGHT: 66 IN | DIASTOLIC BLOOD PRESSURE: 84 MMHG | OXYGEN SATURATION: 94 % | BODY MASS INDEX: 17.49 KG/M2 | RESPIRATION RATE: 19 BRPM | TEMPERATURE: 97.9 F | SYSTOLIC BLOOD PRESSURE: 130 MMHG | HEART RATE: 61 BPM

## 2023-08-08 DIAGNOSIS — R41.3 MEMORY CHANGES: ICD-10-CM

## 2023-08-08 DIAGNOSIS — N95.2 POSTMENOPAUSAL ATROPHIC VAGINITIS: ICD-10-CM

## 2023-08-08 DIAGNOSIS — H61.22 IMPACTED CERUMEN OF LEFT EAR: ICD-10-CM

## 2023-08-08 DIAGNOSIS — K20.0 EOSINOPHILIC ESOPHAGITIS: ICD-10-CM

## 2023-08-08 DIAGNOSIS — I73.00 RAYNAUD'S SYNDROME WITHOUT GANGRENE: ICD-10-CM

## 2023-08-08 DIAGNOSIS — F51.01 PRIMARY INSOMNIA: ICD-10-CM

## 2023-08-08 DIAGNOSIS — R43.2 ABNORMAL TASTE IN MOUTH: Primary | ICD-10-CM

## 2023-08-08 PROCEDURE — 99214 OFFICE O/P EST MOD 30 MIN: CPT | Performed by: INTERNAL MEDICINE

## 2023-08-08 ASSESSMENT — PAIN SCALES - GENERAL: PAINLEVEL: NO PAIN (0)

## 2023-08-08 NOTE — ASSESSMENT & PLAN NOTE
Continues to struggle with some memory issues and lapses.  -Neuropsych evaluation is scheduled for 9/20/2023

## 2023-08-08 NOTE — PROGRESS NOTES
Assessment & Plan   Problem List Items Addressed This Visit          Nervous and Auditory    Abnormal taste in mouth - Primary     Patient has been struggling with abnormal sour/salty taste in her mouth since February or March of this year.  This has not improved with increasing her PPI.  We will have her see GI again to discuss if repeat upper endoscopy is warranted given her history of EOE.  We will also refer to ENT for further evaluation.         Relevant Orders    Adult ENT  Referral    Impacted cerumen of left ear     Cerumen was impacted in the left ear.  Removed with ear lavage by MA.            Digestive    Eosinophilic esophagitis     Continues to have sour taste in her mouth as noted elsewhere that did not improve with increasing omeprazole to BID.    - We are going to have her call [facility] for further evaluation and possible repeat upper endoscopy.   - Continue omeprazole 20 mg (can decrease to daily)   - Continue budesonide 1.5 mg BID            Circulatory    Raynaud's syndrome without gangrene     Not as much of an issue during the summer.  We will use amlodipine 10 mg daily as needed in the wintertime.            Urinary    Postmenopausal atrophic vaginitis     Discussed with the patient that she needs to consistently take her Estrace cream at least 2 or 3 times weekly.  She will make this change, and let me know if she is still having symptoms.            Other    Insomnia     Sleep is getting better right now with only using Xanax 0.25 mg at bedtime as needed.  Only needing this 2-4 nights a week.  -We will continue above regimen, she is going to see sleep medicine in November to discuss further  -Additionally, she will continue to work on sleep hygiene, she notices that sleep is better on nights where she works on relaxation prior to bedtime         Memory changes     Continues to struggle with some memory issues and lapses.  -Neuropsych evaluation is scheduled for 9/20/2023              Prescription drug management  I spent a total of 35 minutes on the day of the visit.   Time spent by me doing chart review, history and exam, documentation and further activities per the note     FUTURE APPOINTMENTS:       - Follow-up visit in 2 months     Manasa Watson MD  [facility] Alaniz is a 68 year old, presenting for the following health issues:  Follow Up (Follow up for some throat issues and sleep ), Ear Problem (Pt states she's having some discomfort in the left ear. Pt also states she is having trouble hearing. ), Medication (Pt would like to discuss estrace. ), and Bladder emptying         8/8/2023     9:50 AM   Additional Questions   Roomed by JUAN Powers   Accompanied by N/A     History of Present Illness     Reason for visit:  Change in taste/ tongue, tinnitus,  follow up on sleep and cognitive changes    EOE / Sour taste: We increased omeprazole to 20 mg BID 7/11/23, she doesn't think this made much of a difference. Still has a sour/salty taste in her mouth, which makes her uninterested in eating.  She tells me that she does not really remember when her taste was last normal, probably sometime between February and March of this year.    Insomnia: Going okay. Taking xanax PRN. Sleep appt scheduled for November, she is on cancellation list.     Memory changes: Neuropsych eval 9/20/23. still noticing some issues.     Raynauds: Amlodipine 10 but only in the winter.     L ear: Feels full and maybe plugged     Bladder: For years, feeling of incomplete emptying.  Denies any incontinence.    Atrophic vaginitis: Is only taking her Estrace cream as needed, is noticing some dryness, irritation and dyspareunia on this regimen.    She eats 4 or more servings of fruits and vegetables daily.She consumes 0 sweetened beverage(s) daily.She exercises with enough effort to increase her heart rate 20 to 29 minutes per day.  She exercises with enough effort to  "increase her heart rate 6 days per week.   She is taking medications regularly.    Review of Systems   Constitutional, HEENT, cardiovascular, pulmonary, gi and gu systems are negative, except as otherwise noted.      Objective    /84 (BP Location: Right arm, Patient Position: Sitting, Cuff Size: Adult Regular)   Pulse 61   Temp 97.9  F (36.6  C) (Oral)   Resp 19   Ht 1.676 m (5' 6\")   Wt 49.4 kg (108 lb 12.8 oz)   LMP  (LMP Unknown)   SpO2 94%   BMI 17.56 kg/m    Body mass index is 17.56 kg/m .  Physical Exam   GENERAL: healthy, thin, alert and no distress  EYES: Eyes grossly normal to inspection, PERRL and conjunctivae and sclerae normal  HENT: normal cephalic/atraumatic, right ear: normal: no effusions, no erythema, normal landmarks, left ear: occluded with wax, nose and mouth without ulcers or lesions, oropharynx clear, and oral mucous membranes moist. No abnormal film on tongue.   NECK: no adenopathy, no asymmetry, masses, or scars and thyroid normal to palpation  RESP: lungs clear to auscultation - no rales, rhonchi or wheezes  CV: regular rate and rhythm, normal S1 S2, no S3 or S4, no murmur, click or rub, no peripheral edema and peripheral pulses strong  ABDOMEN: soft, nontender, no hepatosplenomegaly, no masses and bowel sounds normal  MS: no gross musculoskeletal defects noted, no edema  SKIN: no suspicious lesions or rashes on exposed skin   NEURO: Normal strength and tone, mentation intact and speech normal  PSYCH: mentation appears normal, affect normal/bright                Answers submitted by the patient for this visit:  General Questionnaire (Submitted on 8/6/2023)  Chief Complaint: Chronic problems general questions HPI Form  What is the reason for your visit today? : change in taste/ tongue, tinnitus,  follow up on sleep and cognitive changes  How many servings of fruits and vegetables do you eat daily?: 4 or more  On average, how many sweetened beverages do you drink each day " (Examples: soda, juice, sweet tea, etc.  Do NOT count diet or artificially sweetened beverages)?: 0  How many minutes a day do you exercise enough to make your heart beat faster?: 20 to 29  How many days a week do you exercise enough to make your heart beat faster?: 6  How many days per week do you miss taking your medication?: 0

## 2023-08-08 NOTE — PATIENT INSTRUCTIONS
Call [facility] to get another appointment with Dr. Cisneros. We will also refer you to ENT to discuss.     Okay to decrease omeprazole to once daily.

## 2023-08-08 NOTE — ASSESSMENT & PLAN NOTE
Continues to have sour taste in her mouth as noted elsewhere that did not improve with increasing omeprazole to BID.    - We are going to have her call [facility] for further evaluation and possible repeat upper endoscopy.   - Continue omeprazole 20 mg (can decrease to daily)   - Continue budesonide 1.5 mg BID

## 2023-08-08 NOTE — ASSESSMENT & PLAN NOTE
Discussed with the patient that she needs to consistently take her Estrace cream at least 2 or 3 times weekly.  She will make this change, and let me know if she is still having symptoms.

## 2023-08-08 NOTE — ASSESSMENT & PLAN NOTE
Sleep is getting better right now with only using Xanax 0.25 mg at bedtime as needed.  Only needing this 2-4 nights a week.  -We will continue above regimen, she is going to see sleep medicine in November to discuss further  -Additionally, she will continue to work on sleep hygiene, she notices that sleep is better on nights where she works on relaxation prior to bedtime

## 2023-08-08 NOTE — ASSESSMENT & PLAN NOTE
Not as much of an issue during the summer.  We will use amlodipine 10 mg daily as needed in the wintertime.

## 2023-08-08 NOTE — ASSESSMENT & PLAN NOTE
Patient has been struggling with abnormal sour/salty taste in her mouth since February or March of this year.  This has not improved with increasing her PPI.  We will have her see GI again to discuss if repeat upper endoscopy is warranted given her history of EOE.  We will also refer to ENT for further evaluation.

## 2023-08-17 ENCOUNTER — TRANSFERRED RECORDS (OUTPATIENT)
Dept: HEALTH INFORMATION MANAGEMENT | Facility: CLINIC | Age: 68
End: 2023-08-17
Payer: MEDICARE

## 2023-09-11 ENCOUNTER — MYC MEDICAL ADVICE (OUTPATIENT)
Dept: INTERNAL MEDICINE | Facility: CLINIC | Age: 68
End: 2023-09-11
Payer: MEDICARE

## 2023-09-11 ENCOUNTER — TELEPHONE (OUTPATIENT)
Dept: INTERNAL MEDICINE | Facility: CLINIC | Age: 68
End: 2023-09-11
Payer: MEDICARE

## 2023-09-11 NOTE — TELEPHONE ENCOUNTER
FYI - Status Update    Who is Calling: patient    Update: patient has following appointments scheduled:    09/20/23 Neuropsychology Testing with Dr. Tita Mendez  11/29/23 Sleep  with Dr. Baptiste    Patient feels that Sleep appointment and results are needed for Neuropsychology. Would like to know if Dr. Watson agrees that Neuropsychology will need Sleep study results to address memory concerns as her sleep does contribute to memory issues.    Does caller want a call/response back: Yes     Could we send this information to you in [employer] or would you prefer to receive a phone call?:   Patient would like to be contacted via [employer] or phone call what ever is easiest

## 2023-09-20 ENCOUNTER — OFFICE VISIT (OUTPATIENT)
Dept: NEUROPSYCHOLOGY | Facility: CLINIC | Age: 68
End: 2023-09-20
Payer: MEDICARE

## 2023-09-20 DIAGNOSIS — F32.A DEPRESSION, UNSPECIFIED DEPRESSION TYPE: ICD-10-CM

## 2023-09-20 DIAGNOSIS — R41.3 MEMORY CHANGES: ICD-10-CM

## 2023-09-20 DIAGNOSIS — G31.84 MILD NEUROCOGNITIVE DISORDER: Primary | ICD-10-CM

## 2023-09-20 DIAGNOSIS — F41.9 ANXIETY DISORDER, UNSPECIFIED TYPE: ICD-10-CM

## 2023-09-20 PROCEDURE — 96138 PSYCL/NRPSYC TECH 1ST: CPT | Performed by: CLINICAL NEUROPSYCHOLOGIST

## 2023-09-20 PROCEDURE — 96139 PSYCL/NRPSYC TST TECH EA: CPT | Performed by: CLINICAL NEUROPSYCHOLOGIST

## 2023-09-20 PROCEDURE — 96133 NRPSYC TST EVAL PHYS/QHP EA: CPT | Performed by: CLINICAL NEUROPSYCHOLOGIST

## 2023-09-20 PROCEDURE — 96116 NUBHVL XM PHYS/QHP 1ST HR: CPT | Performed by: CLINICAL NEUROPSYCHOLOGIST

## 2023-09-20 PROCEDURE — 96132 NRPSYC TST EVAL PHYS/QHP 1ST: CPT | Performed by: CLINICAL NEUROPSYCHOLOGIST

## 2023-09-20 NOTE — LETTER
9/20/2023       RE: Katty aPtton  [address]  [facility] [id number]     Dear Colleague,    Thank you for referring your patient, Katty Patton, to the [facility] NEUROPSYCHOLOGY [facility]. Please see a copy of my visit note below.    NEUROPSYCHOLOGY CONSULT  [facility] Neuropsychology [facility]    NAME: Katty Patton    YOB: 1955   AGE: 68  EDU: 16  DATE OF EVALUATION: 9/20/2023    REASON FOR REFERRAL:  Ms. Patton is a 68 year old, right-handed, White female presenting with concerns about cognitive functioning in the context of Eosinophilic esophagitis, glaucoma and insomnia. She was referred for a neurocognitive evaluation by her primary care provider, Dr. Watson, to assist with differential diagnosis and care planning.     DIAGNOSTIC SUMMARY:  Results of testing indicate that Ms. Patton is a woman of estimated average to high average premorbid intellectual functioning whose performance is notable for borderline impaired complex attention, and inhibition, as well as visuospatial planning and organization. Performance on all other executive measures including complex inhibition switching, working memory, phonemic fluency, semantic set shifting as well as planning and problem solving was assessed within normal limits. Subtly below expectation performance (low average) was evident on a measure of rote verbal memory. These limited and subtle weaknesses were evident in the context of otherwise intact performance on measures of basic attention, cognitive efficiency, verbal learning, prose memory, language (sight word reading, abstraction, fund of knowledge, semantic fluency confrontation naming), visuospatial reasoning skills and the executive functioning measures described above. Finally, on self-report questionnaires, she endorsed mild symptoms of depression and moderate  symptoms of anxiety.     I was able to share the above results along with my impressions and recommendations (see below) with Ms. Patton and her  Alexis  on the day of testing. I provided the opportunity for them to ask questions about the evaluation and results. At the end of our meeting, they indicated that they understood the results and that I had answered all of their questions. They were encouraged to reach out to me (contact information included in the AVS) should any further questions or concerns arise in the future. (Ms. Patton provided verbal consent for me to talk to Alexis if he should call with questions.) I explained that I would send the impressions and recommendations from the report as part of the After Visit Summary (which will be directed to clinic staff to print and send by mail) and that Dr. Watson would be receiving the full report as the consulting provider and PCP.     Summary for Providers  ASSESSMENT:  Borderline impaired complex attention, and inhibition, as well as visuospatial planning and organization  Performance on all other executive measures was assessed within normal limits  Low average rote verbal memory, but average prose memory  Otherwise intact performance on measures of basic attention, cognitive efficiency, verbal learning, prose memory, language, visuospatial reasoning skills and most measures of executive functioning  Overall, there is no compelling or consistent evidence of acquired cognitive dysfunction but rather subtle variability (within the executive domain)  This subtle variability on testing as well as cognitive inefficiencies in daily life is best explained by nonrestorative sleep together with ongoing symptoms of anxiety and depression  If sleep and mood symptoms are better controlled she will very likely experience improvement in day to day cognitive functioning  Diagnosis: No cognitive diagnosis, normal aging, cognitive inefficiencies secondary to  nonrestorative sleep and mood symptoms    PLAN:  Strongly recommend moving forward with evaluation in sleep medicine to assess for possible reversible causes of sleep disturbance  Recommend referral for psychotherapy to assist with managing ongoing anxiety and depression associated with cognitive lapses and insomnia. As she was open to this so referral has been placed  Continue to take care of herself physically (medication adherence, regular exercise, healthy diet)  Continued use of compensatory strategies to support cognition in daily life  Reevaluation in 1 to 2 years should cognitive symptoms persist after insomnia and mood symptoms have improved.  However, if cognition improves, no specific follow-up is needed and current results can be used as a baseline should concerns arise in the future.    FEEDBACK:  Ms. Patton received the results of this evaluation on the day of testing.     Thank you for allowing me to participate in Ms. Patton's care.  Please contact me with any questions regarding the content of this report.      Summary for Patients  DIAGNOSTIC IMPRESSIONS (from 9/20/2023 Neuropsychology Consult):    Results  Borderline impaired performance on some problem solving measures but solidly intact performance on most other problem solving tasks  Otherwise intact performance on measures of basic attention, speed of thinking verbal learning, story memory, language, spatial skills and most measures of problem solving    Diagnosis  No Cognitive Diagnosis: Normal Aging  Unspecified depressive disorder   unspecified anxiety disorder    RECOMMENDATIONS:  Driving and Activities of Daily Living  Ms. Patton should be reassured that her mental faculties are largely well preserved for her age and that the variability and fluctuations in her thinking skills are most likely explained by nonrestorative sleep and mood factors such that if those issues are addressed, her everyday cognitive functioning should also  improve  There are no concerns from a cognitive perspective with Ms. Patton s current ability to continue to live independently, drive, or manage her finances or personal affairs.  Physical and Emotional Health  Given her history of sleep disturbance, Ms. Patton is strongly encouraged to move forward with plans for formal evaluation with Sleep Medicine.  In the meantime, she may benefit from evaluating her current sleep hygiene behaviors and if need be, make changes to help facilitate sleep. Such changes might include avoiding watching television or reading in bed before attempting to fall asleep (instead, she should perform these activities outside of the bedroom), avoid eating, consuming caffeine, or exercising several hours before trying to fall asleep, avoid napping during the day, and establishing a regular bedtime and wake-up time.  Relaxation exercises (e.g., listening to soothing music, deep breathing, progressive muscle relaxation) right before going to bed might also help. If she tends to have difficulty returning to sleep in the night or in falling asleep due to worrying, cognitive strategies, which could be discussed with a therapist may also be useful.   Given her ongoing symptoms of depression and anxiety, it is recommended tthat Ms. Patton consider engaging in psychotherapy services to address her mood symptoms. As she was open to this, a referral has been placed and she should be receiving a call within the next few days. She may also call the [facility] access line to schedule anytime at [phone].  In addition, all three of the providers below offer psychotherapeutic services, if Ms. Patton is having difficulty obtaining an appointment with [facility] in a timely manner:  [employer]  -[email or web address]  -[phone](all locations)  [facility]  -[employer]  -Multiple locations with individual phone numbers that can be found online  [facility]  -[email or web address]  -Multiple locations with individual phone numbers that can be found online  In the meantime, behavioral activation techniques such as regular exercise (under the guidance of her physician), recreational activities and regular social interaction would likely be effective in helping Ms. Patton to manage her mood.   It is important that Ms. Patton continue to adhere to her medication treatment regimen and follow a healthy diet so as to maintain her physical health, as this can have a significant impact on her physical, emotional, and cognitive functioning.   Memory and Organization  Ms. Patton is encouraged to continue to engage in stimulating activities, (i.e., reading, card games, puzzles) to keep her cognitively active.   Ms. Patton did not demonstrate memory impairments on testing; however, such difficulties are more likely to appear when she is especially tired/ fatigued, in pain or struggling with mood symptoms. In those situations, she will benefit from the following strategies.  In her daily life, Ms. aPtton will continue to benefit from the use of compensation techniques. That is, she may find it helpful to post reminder notes around the house, make lists, and carry a small calendar so that she can feel more comfortable and confident in her ability to remember information. A daily planner could also be used as a memory book where important information is recorded and organized for future reference.   Ms. Patton should also create a system to establish set locations for certain items (i.e., keys) such that she always knows where to put them upon entering the house and where to look when she needs them. If she would like to keep certain items out of sight (i.e., a wallet), she could set up a specific hidden place to keep items and use that same place so as to ensure she can find the required item when needed.   When required to learn new information, Ms. Patton does best when  "information is placed within a meaningful context.  Learning will be facilitated if it is made personally meaningful for her and is not presented in a rote fashion (i.e., a list of unrelated information or unconnected tasks to accomplish).   Follow-up  Re-evaluation in 1-2 years is recommended should cognitive concerns persist after mood and sleep are better controlled.  However if cognition improves, no specific follow-up with Neuropsychology is recommended.  Should cognitive concerns arise in the future, re-evaluation is recommended and current results can be used as a baseline for future comparison.    --------------------------------EXTENDED REPORT--------------------------------  Verbal consent for neuropsychological testing was received following the provision of information about the nature of the evaluation, and the opportunity to ask questions.     HISTORY OF PRESENTING PROBLEM:    Current Interview  Ms. Patton was accompanied by her  Alexis  and was an adequate historian. Together they provided the following information.     Ms. Patton reports an approximate 6 month history of changes in her thinking.  Specifically, she describes how she \"forgets too many things.\"  She described forgetfulness for names, tasks she has completed, tasks she intends to complete, as well as misplacing items and forgetting details of conversations.  She also described increased difficulties with maintaining focus and articulating her thoughts, as well as slowed speed of thinking.  She was able to describe the ongoing COVID pandemic and appropriate timeline.  She shared that she has had longstanding sleep issues but they have worsened over the last 6 months such that she estimates she only obtains about 4 to 6 hours each night.  She is scheduled to meet with sleep medicine in November.     Her  Alexis generally agreed with these descriptions and timeline and added that she often repeats herself and \"gets lost in the " "story line,\" noting that she struggles to get her thoughts together.  He noted that she is a little more emotional and becomes upset if he corrects her, either with something she is doing unusually or something she is not recalling.  He added that she is also not quite as engaged socially as she used to be.  He shared that if she is especially sleep deprived, all of the symptoms are much worse.    With regard to the activities of daily living, Ms. Patton reported that she currently lives independently with her .  They are in the same home where they have lived for 34 years.  She noted that previously she had done all the cooking but since COVID her  has started doing more of the cooking as he was interested in doing so.  They share responsibilities now and she has not experienced any significant problems.  Her  has always managed the family finances.  She manages her own medications and until 6 months ago she indicated she never had problems but now she is often questioning if she for example took her eyedrops or not.  She thinks that for the most part \"it is okay,\" but she is now questioning more.  She stated that she continues to drive with no recent tickets, accidents or incidents of becoming lost.  Her  did share that he has become a little more concerned as her driving is a little more erratic at night and she indicated that this is true as she cannot see as well.    MEDICAL HISTORY:  Ms. Patton's medical history is significant for   Past Medical History:   Diagnosis Date    Bradycardia 8/14/2015    Asymptomatic Possibly related to vagal tone    Esophageal rupture     Glaucoma (increased eye pressure)     Hypertension     new no meds    Nuclear sclerosis of both eyes     Pre-syncope 8/14/2015    Occasional episodes in her teens and as an adult     Ms. Patton's current problem list includes   Patient Active Problem List   Diagnosis    Primary open angle glaucoma of both eyes, " "moderate stage    Nuclear sclerosis of both eyes    Benign neoplasm of colon    Cervicalgia    Bradycardia    Diverticular disease of colon    Dysphagia    Eosinophilic esophagitis    Glaucoma    Insomnia    Postmenopausal atrophic vaginitis    Premature atrial complexes    Symptomatic menopausal or female climacteric states    Encounter for Medicare annual wellness exam    Raynaud's syndrome without gangrene    Oral thrush    Memory changes    Abnormal taste in mouth    Impacted cerumen of left ear     Ms. Patton denied any history of stroke, seizure or head injury with loss of consciousness. She denied having tested positive for COVID or experiencing an illness concerning for COVID.  Her  agreed.    With regard to exercise Ms. Patton indicated that she used to be more active but she has had some difficulties with her knees which has limited her biking and running.  She indicated that now she power walks approximately 4 to 5 days a week for 25 to 30 minutes.  Her  estimated that she goes for a couple miles each time.    Ms. Patton indicated that she has glaucoma and often experiences dry eyes which often makes it hard for her to read and do needlework the way she used to do so she is not doing them as often.  She also indicated that she has had reduced taste and wonders if this may be related to her medication she takes for Eosinophilic esophagitis (budesonide).  She feels that her sense of smell has also declined and this has been more of a long-term issue and not as noticeable as her taste.  She described her appetite as \"not good,\" indicating that she has not been eating as much and has lost weight, albeit slowly over time.  Due to her Eosinophilic esophagitis she does have to cut up food into small pieces and be thoughtful about her eating but she does feel like this has been well managed in recent years.    Ms. Patton described a 30 to 40-year history of sleep disturbance but noted that it " has been worse in the last few years and much worse in the last 6 months.  She feels its been getting a little bit better as she has been experimenting with different medications including Xanax, temazepam and trazodone.  She was at 1 point taking Xanax 3-4 times a week but she did not like the way she felt in the morning.  In the last couple weeks she has tried Unisom a couple times a week and feels like this is helping.  Even though her sleep is somewhat improved she is only sleeping 4 to 6 hours each night and never feels rested.  They were unaware of any unusual sleep behaviors but her  indicated that she does snore but not every night.  She is scheduled to be evaluated in sleep medicine in November.    Diagnostic studies:  I could not locate any recent (within the last 10 years) neuroimaging studies in the [facility] chart or in Care Everywhere.    Past Surgical History:   Procedure Laterality Date    GLAUCOMA SURGERY      [facility] LIGATE FALLOPIAN TUBE      Description: Tubal Ligation;  Recorded: 09/18/2009;     Current medications include (per medical record):   Current Outpatient Medications:     ALPRAZolam (XANAX) 0.25 MG tablet, Take 1 tablet (0.25 mg) by mouth nightly as needed for anxiety, Disp: 30 tablet, Rfl: 3    amLODIPine (NORVASC) 10 MG tablet, Take 1 tablet (10 mg) by mouth daily, Disp: 90 tablet, Rfl: 3    estradiol (ESTRACE VAGINAL) 0.1 MG/GM vaginal cream, Half to one applicator three times weekly, Disp: 42.5 g, Rfl: 3    omeprazole (PRILOSEC) 40 MG DR capsule, Take 1 capsule (40 mg) by mouth daily, Disp: 90 capsule, Rfl: 1    timolol, PF, (TIMOPTIC OCUDOSE) 0.5 % ophthalmic solution, INSTILL 1 DROP INTO BOTH EYES TWICE A DAY, Disp: , Rfl: .    RELEVANT FAMILY MEDICAL HISTORY:   They were unaware of any neurologic or neurodegenerative conditions in the family.    Other family medical history is positive for the following:  Family History   Problem Relation Age of Onset    Diabetes Mother      "Hypertension Mother     Hypertension Father     Cancer No family hx of     Glaucoma No family hx of     Macular Degeneration No family hx of     Eye Surgery No family hx of     Breast Cancer Maternal Aunt 62.00    Dementia Maternal Aunt     Coronary Artery Disease Mother 61.00    Diabetes Type 2  Mother     Sudden Death Father     No Known Problems Sister     Hypertension Brother     No Known Problems Brother     No Known Problems Brother     No Known Problems Brother     No Known Problems Brother      PSYCHIATRIC AND SUBSTANCE USE HISTORY:  With regard to her psychiatric history, Ms. Patton acknowledges that she has always been somewhat of a worrier, but never excessive and she denied a history of past psychiatric conditions or mental health treatment.  Alexis agreed but did add that she has had some periods of depression around significant events such as a miscarriage.  Outside of such events he denied significant depressive or anxious symptoms.  They  both agree that as she has gotten older, she is more prone to frustration and Ms. Patton herself acknowledges that she tends to back off and pull away rather than dealing with challenges head-on. Alexis shared that she is more recently more withdrawn in social situations and he has to take on the coordination roles that she had done in the past.  Further, she is not as talkative and Ms. Patton shared that she worries that she will get stuck in the middle of a sentence and not be able to express herself.      At the current time, Ms. Patton described her mood as \"not great.\"  She indicated that she is frustrated and scared with the changes she has been experiencing and \"worried about the endpoint.\"  She did become somewhat tearful when talking about this.. She denied any suicidal ideation, plan or intent or hallucinations or delusions.     With regard to substance use, Ms. Patton denied any history of problematic alcohol use and her  agreed.  She estimates " "that she might have half a glass of wine several nights a week at most and her  agreed.  She denies any history of tobacco or recreational drug use.    SOCIAL HISTORY:  Ms. Patton shared that she was born in [place] but moved to [place] at age 1. She was unaware of any complications in her mother's pregnancy with her or in her birth, or delays in reaching developmental milestones. She denied a history of early learning or attention difficulties, individualized instruction, or grade repetition. She described herself as a good student, earning mostly A's and B's in high school.  She went on to the [employer] and earned a bachelor's in education.  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  When her son was born she focused on raising him and never returned to work.    Ms. Patton was able to correctly report that she has been  for 41 years and has 1 son and a 16-year-old grandson.    BEHAVIORAL OBSERVATIONS:   Ms. Patton arrived on time and accompanied by her  Alexis  to today's appointment. She was appropriately dressed and groomed. She was alert and engaged during the interview. Gait was unremarkable. Her mood was described as \"not great\" and she appeared depressed. She became tearful at times when discussing the uncertainty and frustration with cognitive changes. Rapport was easily established and eye contact was unremarkable. She was pleasant and cooperative. Rate, prosody, and content of speech were grossly normal. No significant word finding difficulties or paraphasic errors were evident. There was no evidence of a jaime thought disorder; no hallucinations or delusions were apparent. Judgment and insight appeared fair.       Ms. Patton appeared adequately motivated and engaged easily in the testing component of the evaluation. Her performance was fully intact on embedded measures of " objective effort. She attempted all tasks presented to her but worked at a slow pace. She appeared anxious and often second guessed herself, taking a lengthy amount of time to think about and provide final answers.  She did not appear overly frustrated by difficult or challenging tasks (but rather was questioning herself on all tasks), but responded appropriately to encouragement from the examiner.  Otherwise, no significant barriers to testing were observed and the following is judged to be a reasonable representation of Ms. Patton's current cognitive strengths and weaknesses though likely impacted to some extent by her anxiety during testing.    TESTS ADMINISTERED:   [place] Naming Test (BNT), [place] Verbal Learning Test - 3 Standard (CVLT-3), Clock Drawing, DKEFS (Color Word Interference, [place] Test, Verbal Fluency), Generalized Anxiety Disorder-7 (AIDEN-7), Geriatric Depression Scale 30 (GDS), Nahid-Osterrieth Complex Figure Test, copy only (RCFT), Trail Making Test (TMT), WAIS-IV (Similarities, Information, Block Design, Matrix Reasoning, Digit Span), WMS-IV Logical Memory, WRAT-5 Word Reading (blue), and WMS-III Information and Orientation.    MOANS norms were used for BNT, TMT  (Raw scores in parentheses)  PPE was not worn by the examiner or the patient    DESCRIPTIVE PERFORMANCE KEY:    Labels for tests with Normal Distributions  Score Label Standard Score %ile Rank   Exceptionally high score  > 130 > 98   Above average score 120-129 91-97   High average score 110-119 75-90   Average score  25-74   Low average score 80-89 9-24   Below average score 70-79 2-8   Exceptionally low score < 70 < 2     Labels for tests with Non-Normal Distributions  Score Label %ile Rank   Within normal expectations/ limits score (WNL) > 24   Low average score 9-24   Below average score 2-8   Exceptionally low score < 2     The following test results utilize score labels as adapted from Murphy Ram, Lamberto Jauregui,  "Oralia De León E. Mark Mahone, Malia Schultz, Aden Rosado, Sesar Soto & Conference Participants (2020): American Academy of Clinical Neuropsychology consensus conference statement on uniform labeling of performance test scores, The Clinical Neuropsychologist, DOI: 10.1080/41980977.2020.9108538    All scores contain some measure of error; scores are reported here as they are obtained by the individual (without reference to the range of error). These are meant as labels and not interpretation of performance. While other relevant comments regarding task performance are provided below, please see the Assessment, Impressions and Diagnostic Summary sections of this report for interpretation of the scores and the cognitive profile as a whole, including what does and does not constitute impairment.    OPTIMAL PREMORBID INTELLECT:  Optimal premorbid intellectual abilities were estimated as falling in the average to high average range based on Ms. Patton's educational and occupational histories and performance on tasks least likely to be affected by acquired brain dysfunction (i.e.,  hold tests ).    SUMMARY OF TEST RESULTS:     Orientation, Attention and Processing Speed  Mental status exam was measured as a low average score for her age (13). She was oriented to person, place, time, and date and was able to correctly name the current president. She reported that Obama was the most recent president (actually [place]).     Performance on a measure of basic attention and working memory was assessed as a low average score (20). This reflected an average score for basic attention skills (LDF = 6) and working memory scores that ranged from a low average score (LDS = 5) to an average score (LDB = 5).    A simple sequencing task (36\"), a speeded color naming task (35\") and a speeded word reading task (24\") were all assessed as an average score.     Language  Sight word reading skills (68) were assessed as " an above average score. Verbal abstraction skills (23) were assessed as an average score. Fund of general knowledge (15) was assessed as an average score. Her performance on a measure of semantic fluency was measured as an average score (38). Confrontation naming was measured as a within normal limits score (58).    Visuospatial Skills  Performance on a block construction task (24) resulted in an average score. Performance on a pattern completion task (17) was measured as a high average score.    Learning and Memory  On a list learning task, overall learning for a 16 item word list was measured as an average score (7,8,10,10,12). After a brief delay, she was able to retain 5 words (a low average score) for immediate recall performance. After a longer 20 minute delay, she was able to retain 7 words (a low average score) for delayed free recall performance.  Limited  benefit was obtained from recognition cues as recognition memory was assessed as a below average score. She correctly identified 8 words, and made 2 false positive errors. Forced choice performance was perfect.    Immediate memory for two short stories was measured as an average score (31). Delayed recall of these stories resulted in an average score (16, 84% retention). Recognition memory was measured as an average score (18/23).     Executive Functioning  Working memory skills ranged from a low average score (LDS = 5) to an average score (LDB = 5). A complex sequencing and set shifting task was measured as a below average to low average score (132 ). This task was notable for 2 errors. Her performance on a measure of phonemic fluency resulted in a high average score (41). A semantic set shifting task was assessed as an average score (Category Switching Accuracy = 11). On a measure of planning and problem-solving, overall performance was assessed as a high average score in terms of total achievement (18). Her performance was characterized by an extremely  "slow (but accurate) response style (exceptionally low score for Time per move ratio = 9.4, Move accuracy ratio = 1.1). Her ability to inhibit an over-learned response was assessed as a low average score (92\"). Her performance on this task was without error (high average score). On the more challenging set shifting portion of the task in which she had to alternate between providing and inhibiting an over-learned response, she earned an average score (80\"). This latter task was completed without error (high average score). Her copy of a complex figure was performed as a below average to low average score for her age (28.5) and notable for mild inattention to detail and mild difficulty integrating detail. Her drawing of a clock was unremarkable.     Mood  On the Geriatric Depression Scale-30 (GDS), a self report measure of depressive symptomatology, she obtained a score of 14, placing her in the range of mild symptoms of depression.     On the Generalized Anxiety Disorder-7, a self-report measure of anxiety, she obtained a score of 11, placing her in the range of moderate anxiety.     EVALUATION SERVICES AND TIME:   A clinical interview/neurobehavioral status examination was conducted with the patient and documented. I thoroughly reviewed the medical record, selected the neuropsychological test battery, provided supervision to the individual who administered and scored the neuropsychological test battery, interpreted/integrated patient data and test results, engaged in clinical decision making, treatment planning, report writing/preparation, and provided and documented interactive feedback of test results on the day of testing . A trained examiner/technician directly administered and scored 2+ neuropsychological tests. Please see below for a breakdown of time spent and the associated codes billed for these services.     Services   Time Spent  CPT Codes   Neurobehavioral Status Exam:  (e.g., face-to-face, " interpretation, report) 55 minutes 1 x 96116   Neuropsychological Evaluation Services:   (e.g., integration, interpretation, treatment planning, clinical decision making, feedback)   155 minutes   1 x 96132  2 x 96133        Neuropsychological Testing by Trained Examiner/Technician:  (e.g., test administration, scoring, 2+ tests administered)   155 minutes   1 x 96138  4 x 96139     Diagnosis:  No Cognitive Diagnosis: Normal Aging  Unspecified depressive disorder   unspecified anxiety disorder    For diagnostic and coding purposes, Ms. Patton has a history of Eosinophilic esophagitis, glaucoma and insomnia and was referred for an evaluation of Memory Changes and Mild Neurocognitive Disorder. Feedback of results was provided on the day of testing.       Tita Mendez, PhD, LP, [facility]  Clinical Neuropsychologist, [id number]  Board Certified in Clinical Neuropsychology    [facility], [address]  [place] [address]  Phone:  [phone]    The patient was seen for a neuropsychological evaluation for the purposes of diagnostic clarification and treatment planning. 130 minutes of face-to-face testing were provided by this writer. An additional 25 minutes were spent scoring and compiling test results. The patient was cooperative with testing. No concerns were brought to my attention. Please see Dr. Mendez's report for a detailed description of the charges and interpretation and integration of the findings.

## 2023-09-20 NOTE — PROGRESS NOTES
NEUROPSYCHOLOGY CONSULT  [facility]    NAME: Katty Patton    YOB: 1955   AGE: 68  EDU: 16  DATE OF EVALUATION: 9/20/2023    REASON FOR REFERRAL:  Ms. Patton is a 68 year old, right-handed, White female presenting with concerns about cognitive functioning in the context of Eosinophilic esophagitis, glaucoma and insomnia. She was referred for a neurocognitive evaluation by her primary care provider, Dr. Watson, to assist with differential diagnosis and care planning.     DIAGNOSTIC SUMMARY:  Results of testing indicate that Ms. Patton is a woman of estimated average to high average premorbid intellectual functioning whose performance is notable for borderline impaired complex attention, and inhibition, as well as visuospatial planning and organization. Performance on all other executive measures including complex inhibition switching, working memory, phonemic fluency, semantic set shifting as well as planning and problem solving was assessed within normal limits. Subtly below expectation performance (low average) was evident on a measure of rote verbal memory. These limited and subtle weaknesses were evident in the context of otherwise intact performance on measures of basic attention, cognitive efficiency, verbal learning, prose memory, language (sight word reading, abstraction, fund of knowledge, semantic fluency confrontation naming), visuospatial reasoning skills and the executive functioning measures described above. Finally, on self-report questionnaires, she endorsed mild symptoms of depression and moderate symptoms of anxiety.     I was able to share the above results along with my impressions and recommendations (see below) with Ms. Patton and her  Alexis  on the day of testing. I provided the opportunity for them to ask questions about the evaluation and results. At the end of our meeting, they indicated that they understood the results  and that I had answered all of their questions. They were encouraged to reach out to me (contact information included in the AVS) should any further questions or concerns arise in the future. (Ms. Patton provided verbal consent for me to talk to Alexis if he should call with questions.) I explained that I would send the impressions and recommendations from the report as part of the After Visit Summary (which will be directed to clinic staff to print and send by mail) and that Dr. Watson would be receiving the full report as the consulting provider and PCP.     Summary for Providers  ASSESSMENT:  Borderline impaired complex attention, and inhibition, as well as visuospatial planning and organization  Performance on all other executive measures was assessed within normal limits  Low average rote verbal memory, but average prose memory  Otherwise intact performance on measures of basic attention, cognitive efficiency, verbal learning, prose memory, language, visuospatial reasoning skills and most measures of executive functioning  Overall, there is no compelling or consistent evidence of acquired cognitive dysfunction but rather subtle variability (within the executive domain)  This subtle variability on testing as well as cognitive inefficiencies in daily life is best explained by nonrestorative sleep together with ongoing symptoms of anxiety and depression  If sleep and mood symptoms are better controlled she will very likely experience improvement in day to day cognitive functioning  Diagnosis: No cognitive diagnosis, normal aging, cognitive inefficiencies secondary to nonrestorative sleep and mood symptoms    PLAN:  Strongly recommend moving forward with evaluation in sleep medicine to assess for possible reversible causes of sleep disturbance  Recommend referral for psychotherapy to assist with managing ongoing anxiety and depression associated with cognitive lapses and insomnia. As she was open to this so  referral has been placed  Continue to take care of herself physically (medication adherence, regular exercise, healthy diet)  Continued use of compensatory strategies to support cognition in daily life  Reevaluation in 1 to 2 years should cognitive symptoms persist after insomnia and mood symptoms have improved.  However, if cognition improves, no specific follow-up is needed and current results can be used as a baseline should concerns arise in the future.    FEEDBACK:  Ms. Patton received the results of this evaluation on the day of testing.     Thank you for allowing me to participate in Ms. Patton's care.  Please contact me with any questions regarding the content of this report.      Summary for Patients  DIAGNOSTIC IMPRESSIONS (from 9/20/2023 Neuropsychology Consult):    Results  Borderline impaired performance on some problem solving measures but solidly intact performance on most other problem solving tasks  Otherwise intact performance on measures of basic attention, speed of thinking verbal learning, story memory, language, spatial skills and most measures of problem solving    Diagnosis  No Cognitive Diagnosis: Normal Aging  Unspecified depressive disorder   unspecified anxiety disorder    RECOMMENDATIONS:  Driving and Activities of Daily Living  Ms. Patton should be reassured that her mental faculties are largely well preserved for her age and that the variability and fluctuations in her thinking skills are most likely explained by nonrestorative sleep and mood factors such that if those issues are addressed, her everyday cognitive functioning should also improve  There are no concerns from a cognitive perspective with Ms. Patton s current ability to continue to live independently, drive, or manage her finances or personal affairs.  Physical and Emotional Health  Given her history of sleep disturbance, Ms. Patton is strongly encouraged to move forward with plans for formal evaluation with Sleep  Medicine.  In the meantime, she may benefit from evaluating her current sleep hygiene behaviors and if need be, make changes to help facilitate sleep. Such changes might include avoiding watching television or reading in bed before attempting to fall asleep (instead, she should perform these activities outside of the bedroom), avoid eating, consuming caffeine, or exercising several hours before trying to fall asleep, avoid napping during the day, and establishing a regular bedtime and wake-up time.  Relaxation exercises (e.g., listening to soothing music, deep breathing, progressive muscle relaxation) right before going to bed might also help. If she tends to have difficulty returning to sleep in the night or in falling asleep due to worrying, cognitive strategies, which could be discussed with a therapist may also be useful.   Given her ongoing symptoms of depression and anxiety, it is recommended tthat Ms. Patton consider engaging in psychotherapy services to address her mood symptoms. As she was open to this, a referral has been placed and she should be receiving a call within the next few days. She may also call the [facility] access line to schedule anytime at [phone].  In addition, all three of the providers below offer psychotherapeutic services, if Ms. Patton is having difficulty obtaining an appointment with [facility] in a timely manner:  [facility]  -[email or web address]  -[phone](all locations)  [facility]  -[facility]  -Multiple locations with individual phone numbers that can be found online  [facility]  -[email or web address]  -Multiple locations with individual phone numbers that can be found online  In the meantime, behavioral activation techniques such as regular exercise (under the guidance of her physician), recreational activities and regular social interaction would likely be effective in helping Ms. Patton to manage her mood.   It is  important that Ms. Patton continue to adhere to her medication treatment regimen and follow a healthy diet so as to maintain her physical health, as this can have a significant impact on her physical, emotional, and cognitive functioning.   Memory and Organization  Ms. Patton is encouraged to continue to engage in stimulating activities, (i.e., reading, card games, puzzles) to keep her cognitively active.   Ms. Patton did not demonstrate memory impairments on testing; however, such difficulties are more likely to appear when she is especially tired/ fatigued, in pain or struggling with mood symptoms. In those situations, she will benefit from the following strategies.  In her daily life, Ms. Patton will continue to benefit from the use of compensation techniques. That is, she may find it helpful to post reminder notes around the house, make lists, and carry a small calendar so that she can feel more comfortable and confident in her ability to remember information. A daily planner could also be used as a memory book where important information is recorded and organized for future reference.   Ms. Patton should also create a system to establish set locations for certain items (i.e., keys) such that she always knows where to put them upon entering the house and where to look when she needs them. If she would like to keep certain items out of sight (i.e., a wallet), she could set up a specific hidden place to keep items and use that same place so as to ensure she can find the required item when needed.   When required to learn new information, Ms. Patton does best when information is placed within a meaningful context.  Learning will be facilitated if it is made personally meaningful for her and is not presented in a rote fashion (i.e., a list of unrelated information or unconnected tasks to accomplish).   Follow-up  Re-evaluation in 1-2 years is recommended should cognitive concerns persist after mood and sleep  "are better controlled.  However if cognition improves, no specific follow-up with Neuropsychology is recommended.  Should cognitive concerns arise in the future, re-evaluation is recommended and current results can be used as a baseline for future comparison.    --------------------------------EXTENDED REPORT--------------------------------  Verbal consent for neuropsychological testing was received following the provision of information about the nature of the evaluation, and the opportunity to ask questions.     HISTORY OF PRESENTING PROBLEM:    Current Interview  Ms. Patton was accompanied by her  Alexis  and was an adequate historian. Together they provided the following information.     Ms. Patton reports an approximate 6 month history of changes in her thinking.  Specifically, she describes how she \"forgets too many things.\"  She described forgetfulness for names, tasks she has completed, tasks she intends to complete, as well as misplacing items and forgetting details of conversations.  She also described increased difficulties with maintaining focus and articulating her thoughts, as well as slowed speed of thinking.  She was able to describe the ongoing COVID pandemic and appropriate timeline.  She shared that she has had longstanding sleep issues but they have worsened over the last 6 months such that she estimates she only obtains about 4 to 6 hours each night.  She is scheduled to meet with sleep medicine in November.     Her  Alexis generally agreed with these descriptions and timeline and added that she often repeats herself and \"gets lost in the story line,\" noting that she struggles to get her thoughts together.  He noted that she is a little more emotional and becomes upset if he corrects her, either with something she is doing unusually or something she is not recalling.  He added that she is also not quite as engaged socially as she used to be.  He shared that if she is especially sleep " "deprived, all of the symptoms are much worse.    With regard to the activities of daily living, Ms. Patton reported that she currently lives independently with her .  They are in the same home where they have lived for 34 years.  She noted that previously she had done all the cooking but since COVID her  has started doing more of the cooking as he was interested in doing so.  They share responsibilities now and she has not experienced any significant problems.  Her  has always managed the family finances.  She manages her own medications and until 6 months ago she indicated she never had problems but now she is often questioning if she for example took her eyedrops or not.  She thinks that for the most part \"it is okay,\" but she is now questioning more.  She stated that she continues to drive with no recent tickets, accidents or incidents of becoming lost.  Her  did share that he has become a little more concerned as her driving is a little more erratic at night and she indicated that this is true as she cannot see as well.    MEDICAL HISTORY:  Ms. Patton's medical history is significant for   Past Medical History:   Diagnosis Date    Bradycardia 8/14/2015    Asymptomatic Possibly related to vagal tone    Esophageal rupture     Glaucoma (increased eye pressure)     Hypertension     new no meds    Nuclear sclerosis of both eyes     Pre-syncope 8/14/2015    Occasional episodes in her teens and as an adult     Ms. Patton's current problem list includes   Patient Active Problem List   Diagnosis    Primary open angle glaucoma of both eyes, moderate stage    Nuclear sclerosis of both eyes    Benign neoplasm of colon    Cervicalgia    Bradycardia    Diverticular disease of colon    Dysphagia    Eosinophilic esophagitis    Glaucoma    Insomnia    Postmenopausal atrophic vaginitis    Premature atrial complexes    Symptomatic menopausal or female climacteric states    Encounter for Medicare " "annual wellness exam    Raynaud's syndrome without gangrene    Oral thrush    Memory changes    Abnormal taste in mouth    Impacted cerumen of left ear     Ms. Patton denied any history of stroke, seizure or head injury with loss of consciousness. She denied having tested positive for COVID or experiencing an illness concerning for COVID.  Her  agreed.    With regard to exercise Ms. Patton indicated that she used to be more active but she has had some difficulties with her knees which has limited her biking and running.  She indicated that now she power walks approximately 4 to 5 days a week for 25 to 30 minutes.  Her  estimated that she goes for a couple miles each time.    Ms. Patton indicated that she has glaucoma and often experiences dry eyes which often makes it hard for her to read and do needlework the way she used to do so she is not doing them as often.  She also indicated that she has had reduced taste and wonders if this may be related to her medication she takes for Eosinophilic esophagitis (budesonide).  She feels that her sense of smell has also declined and this has been more of a long-term issue and not as noticeable as her taste.  She described her appetite as \"not good,\" indicating that she has not been eating as much and has lost weight, albeit slowly over time.  Due to her Eosinophilic esophagitis she does have to cut up food into small pieces and be thoughtful about her eating but she does feel like this has been well managed in recent years.    Ms. Patton described a 30 to 40-year history of sleep disturbance but noted that it has been worse in the last few years and much worse in the last 6 months.  She feels its been getting a little bit better as she has been experimenting with different medications including Xanax, temazepam and trazodone.  She was at 1 point taking Xanax 3-4 times a week but she did not like the way she felt in the morning.  In the last couple weeks she " has tried Unisom a couple times a week and feels like this is helping.  Even though her sleep is somewhat improved she is only sleeping 4 to 6 hours each night and never feels rested.  They were unaware of any unusual sleep behaviors but her  indicated that she does snore but not every night.  She is scheduled to be evaluated in sleep medicine in November.    Diagnostic studies:  I could not locate any recent (within the last 10 years) neuroimaging studies in the [facility] chart or in Care Everywhere.    Past Surgical History:   Procedure Laterality Date    GLAUCOMA SURGERY      [facility] LIGATE FALLOPIAN TUBE      Description: Tubal Ligation;  Recorded: 09/18/2009;     Current medications include (per medical record):   Current Outpatient Medications:     ALPRAZolam (XANAX) 0.25 MG tablet, Take 1 tablet (0.25 mg) by mouth nightly as needed for anxiety, Disp: 30 tablet, Rfl: 3    amLODIPine (NORVASC) 10 MG tablet, Take 1 tablet (10 mg) by mouth daily, Disp: 90 tablet, Rfl: 3    estradiol (ESTRACE VAGINAL) 0.1 MG/GM vaginal cream, Half to one applicator three times weekly, Disp: 42.5 g, Rfl: 3    omeprazole (PRILOSEC) 40 MG DR capsule, Take 1 capsule (40 mg) by mouth daily, Disp: 90 capsule, Rfl: 1    timolol, PF, (TIMOPTIC OCUDOSE) 0.5 % ophthalmic solution, INSTILL 1 DROP INTO BOTH EYES TWICE A DAY, Disp: , Rfl: .    RELEVANT FAMILY MEDICAL HISTORY:   They were unaware of any neurologic or neurodegenerative conditions in the family.    Other family medical history is positive for the following:  Family History   Problem Relation Age of Onset    Diabetes Mother     Hypertension Mother     Hypertension Father     Cancer No family hx of     Glaucoma No family hx of     Macular Degeneration No family hx of     Eye Surgery No family hx of     Breast Cancer Maternal Aunt 62.00    Dementia Maternal Aunt     Coronary Artery Disease Mother 61.00    Diabetes Type 2  Mother     Sudden Death Father     No Known Problems Sister      "Hypertension Brother     No Known Problems Brother     No Known Problems Brother     No Known Problems Brother     No Known Problems Brother      PSYCHIATRIC AND SUBSTANCE USE HISTORY:  With regard to her psychiatric history, Ms. Patton acknowledges that she has always been somewhat of a worrier, but never excessive and she denied a history of past psychiatric conditions or mental health treatment.  Alexis agreed but did add that she has had some periods of depression around significant events such as a miscarriage.  Outside of such events he denied significant depressive or anxious symptoms.  They  both agree that as she has gotten older, she is more prone to frustration and Ms. Patton herself acknowledges that she tends to back off and pull away rather than dealing with challenges head-on. Alexis shared that she is more recently more withdrawn in social situations and he has to take on the coordination roles that she had done in the past.  Further, she is not as talkative and Ms. Ptaton shared that she worries that she will get stuck in the middle of a sentence and not be able to express herself.      At the current time, Ms. Patton described her mood as \"not great.\"  She indicated that she is frustrated and scared with the changes she has been experiencing and \"worried about the endpoint.\"  She did become somewhat tearful when talking about this.. She denied any suicidal ideation, plan or intent or hallucinations or delusions.     With regard to substance use, Ms. Patton denied any history of problematic alcohol use and her  agreed.  She estimates that she might have half a glass of wine several nights a week at most and her  agreed.  She denies any history of tobacco or recreational drug use.    SOCIAL HISTORY:  Ms. Patton shared that she was born in [place] but moved to [place] at age 1. She was unaware of any complications in her mother's pregnancy with her or in her birth, or delays in " "reaching developmental milestones. She denied a history of early learning or attention difficulties, individualized instruction, or grade repetition. She described herself as a good student, earning mostly A's and B's in high school.  She went on to the [employer] and earned a bachelor's in education.  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  When her son was born she focused on raising him and never returned to work.    Ms. Patton was able to correctly report that she has been  for 41 years and has 1 son and a 16-year-old grandson.    BEHAVIORAL OBSERVATIONS:   Ms. Patton arrived on time and accompanied by her  Alexis  to today's appointment. She was appropriately dressed and groomed. She was alert and engaged during the interview. Gait was unremarkable. Her mood was described as \"not great\" and she appeared depressed. She became tearful at times when discussing the uncertainty and frustration with cognitive changes. Rapport was easily established and eye contact was unremarkable. She was pleasant and cooperative. Rate, prosody, and content of speech were grossly normal. No significant word finding difficulties or paraphasic errors were evident. There was no evidence of a jaime thought disorder; no hallucinations or delusions were apparent. Judgment and insight appeared fair.       Ms. Patton appeared adequately motivated and engaged easily in the testing component of the evaluation. Her performance was fully intact on embedded measures of objective effort. She attempted all tasks presented to her but worked at a slow pace. She appeared anxious and often second guessed herself, taking a lengthy amount of time to think about and provide final answers.  She did not appear overly frustrated by difficult or challenging tasks (but rather was questioning herself on all tasks), but responded appropriately " to encouragement from the examiner.  Otherwise, no significant barriers to testing were observed and the following is judged to be a reasonable representation of Ms. Patton's current cognitive strengths and weaknesses though likely impacted to some extent by her anxiety during testing.    TESTS ADMINISTERED:   [place] Naming Test (BNT), [place] Verbal Learning Test - 3 Standard (CVLT-3), Clock Drawing, DKEFS (Color Word Interference, Farson Test, Verbal Fluency), Generalized Anxiety Disorder-7 (AIDEN-7), Geriatric Depression Scale 30 (GDS), Nahid-Osterrieth Complex Figure Test, copy only (RCFT), Trail Making Test (TMT), WAIS-IV (Similarities, Information, Block Design, Matrix Reasoning, Digit Span), WMS-IV Logical Memory, WRAT-5 Word Reading (blue), and WMS-III Information and Orientation.    MOANS norms were used for BNT, TMT  (Raw scores in parentheses)  PPE was not worn by the examiner or the patient    DESCRIPTIVE PERFORMANCE KEY:    Labels for tests with Normal Distributions  Score Label Standard Score %ile Rank   Exceptionally high score  > 130 > 98   Above average score 120-129 91-97   High average score 110-119 75-90   Average score  25-74   Low average score 80-89 9-24   Below average score 70-79 2-8   Exceptionally low score < 70 < 2     Labels for tests with Non-Normal Distributions  Score Label %ile Rank   Within normal expectations/ limits score (WNL) > 24   Low average score 9-24   Below average score 2-8   Exceptionally low score < 2     The following test results utilize score labels as adapted from Murphy Ram, Lamberto Jauregui, Arabella Castro, EULA Spence, Malia Schultz, Sesar Sellers & Conference Participants (2020): American Academy of Clinical Neuropsychology consensus conference statement on uniform labeling of performance test scores, The Clinical Neuropsychologist, DOI: 10.1080/[email or web address]    All scores contain some measure of error;  "scores are reported here as they are obtained by the individual (without reference to the range of error). These are meant as labels and not interpretation of performance. While other relevant comments regarding task performance are provided below, please see the Assessment, Impressions and Diagnostic Summary sections of this report for interpretation of the scores and the cognitive profile as a whole, including what does and does not constitute impairment.    OPTIMAL PREMORBID INTELLECT:  Optimal premorbid intellectual abilities were estimated as falling in the average to high average range based on Ms. Patton's educational and occupational histories and performance on tasks least likely to be affected by acquired brain dysfunction (i.e.,  hold tests ).    SUMMARY OF TEST RESULTS:     Orientation, Attention and Processing Speed  Mental status exam was measured as a low average score for her age (13). She was oriented to person, place, time, and date and was able to correctly name the current president. She reported that Gayla was the most recent president (actually Arie).     Performance on a measure of basic attention and working memory was assessed as a low average score (20). This reflected an average score for basic attention skills (LDF = 6) and working memory scores that ranged from a low average score (LDS = 5) to an average score (LDB = 5).    A simple sequencing task (36\"), a speeded color naming task (35\") and a speeded word reading task (24\") were all assessed as an average score.     Language  Sight word reading skills (68) were assessed as an above average score. Verbal abstraction skills (23) were assessed as an average score. Fund of general knowledge (15) was assessed as an average score. Her performance on a measure of semantic fluency was measured as an average score (38). Confrontation naming was measured as a within normal limits score (58).    Visuospatial Skills  Performance on a block " "construction task (24) resulted in an average score. Performance on a pattern completion task (17) was measured as a high average score.    Learning and Memory  On a list learning task, overall learning for a 16 item word list was measured as an average score (7,8,10,10,12). After a brief delay, she was able to retain 5 words (a low average score) for immediate recall performance. After a longer 20 minute delay, she was able to retain 7 words (a low average score) for delayed free recall performance.  Limited  benefit was obtained from recognition cues as recognition memory was assessed as a below average score. She correctly identified 8 words, and made 2 false positive errors. Forced choice performance was perfect.    Immediate memory for two short stories was measured as an average score (31). Delayed recall of these stories resulted in an average score (16, 84% retention). Recognition memory was measured as an average score (18/23).     Executive Functioning  Working memory skills ranged from a low average score (LDS = 5) to an average score (LDB = 5). A complex sequencing and set shifting task was measured as a below average to low average score (132 ). This task was notable for 2 errors. Her performance on a measure of phonemic fluency resulted in a high average score (41). A semantic set shifting task was assessed as an average score (Category Switching Accuracy = 11). On a measure of planning and problem-solving, overall performance was assessed as a high average score in terms of total achievement (18). Her performance was characterized by an extremely slow (but accurate) response style (exceptionally low score for Time per move ratio = 9.4, Move accuracy ratio = 1.1). Her ability to inhibit an over-learned response was assessed as a low average score (92\"). Her performance on this task was without error (high average score). On the more challenging set shifting portion of the task in which she had to " "alternate between providing and inhibiting an over-learned response, she earned an average score (80\"). This latter task was completed without error (high average score). Her copy of a complex figure was performed as a below average to low average score for her age (28.5) and notable for mild inattention to detail and mild difficulty integrating detail. Her drawing of a clock was unremarkable.     Mood  On the Geriatric Depression Scale-30 (GDS), a self report measure of depressive symptomatology, she obtained a score of 14, placing her in the range of mild symptoms of depression.     On the Generalized Anxiety Disorder-7, a self-report measure of anxiety, she obtained a score of 11, placing her in the range of moderate anxiety.     EVALUATION SERVICES AND TIME:   A clinical interview/neurobehavioral status examination was conducted with the patient and documented. I thoroughly reviewed the medical record, selected the neuropsychological test battery, provided supervision to the individual who administered and scored the neuropsychological test battery, interpreted/integrated patient data and test results, engaged in clinical decision making, treatment planning, report writing/preparation, and provided and documented interactive feedback of test results on the day of testing . A trained examiner/technician directly administered and scored 2+ neuropsychological tests. Please see below for a breakdown of time spent and the associated codes billed for these services.     Services   Time Spent  CPT Codes   Neurobehavioral Status Exam:  (e.g., face-to-face, interpretation, report) 55 minutes 1 x 96116   Neuropsychological Evaluation Services:   (e.g., integration, interpretation, treatment planning, clinical decision making, feedback)   155 minutes   1 x 96132  2 x 96133        Neuropsychological Testing by Trained Examiner/Technician:  (e.g., test administration, scoring, 2+ tests administered)   155 minutes   1 x " 65536  4 x 96139     Diagnosis:  No Cognitive Diagnosis: Normal Aging  Unspecified depressive disorder   unspecified anxiety disorder    For diagnostic and coding purposes, Ms. Patton has a history of Eosinophilic esophagitis, glaucoma and insomnia and was referred for an evaluation of Memory Changes and Mild Neurocognitive Disorder. Feedback of results was provided on the day of testing.       Tita Mendez, PhD, LP, ABPP  Clinical Neuropsychologist, LP#[id number]  Board Certified in Clinical Neuropsychology    [facility] [address], [address]  [place] [address]  Phone:  [phone]

## 2023-09-20 NOTE — PROGRESS NOTES
The patient was seen for a neuropsychological evaluation for the purposes of diagnostic clarification and treatment planning. 130 minutes of face-to-face testing were provided by this writer. An additional 25 minutes were spent scoring and compiling test results. The patient was cooperative with testing. No concerns were brought to my attention. Please see Dr. Mendez's report for a detailed description of the charges and interpretation and integration of the findings.

## 2023-09-28 NOTE — PATIENT INSTRUCTIONS
DIAGNOSTIC IMPRESSIONS (from 9/20/2023 Neuropsychology Consult):    Results  Borderline impaired performance on some problem solving measures but solidly intact performance on most other problem solving tasks  Otherwise intact performance on measures of basic attention, speed of thinking verbal learning, story memory, language, spatial skills and most measures of problem solving    Diagnosis  No Cognitive Diagnosis: Normal Aging  Unspecified depressive disorder   unspecified anxiety disorder    RECOMMENDATIONS:  Driving and Activities of Daily Living  Ms. Patton should be reassured that her mental faculties are largely well preserved for her age and that the variability and fluctuations in her thinking skills are most likely explained by nonrestorative sleep and mood factors such that if those issues are addressed, her everyday cognitive functioning should also improve  There are no concerns from a cognitive perspective with Ms. Patton s current ability to continue to live independently, drive, or manage her finances or personal affairs.  Physical and Emotional Health  Given her history of sleep disturbance, Ms. Patton is strongly encouraged to move forward with plans for formal evaluation with Sleep Medicine.  In the meantime, she may benefit from evaluating her current sleep hygiene behaviors and if need be, make changes to help facilitate sleep. Such changes might include avoiding watching television or reading in bed before attempting to fall asleep (instead, she should perform these activities outside of the bedroom), avoid eating, consuming caffeine, or exercising several hours before trying to fall asleep, avoid napping during the day, and establishing a regular bedtime and wake-up time.  Relaxation exercises (e.g., listening to soothing music, deep breathing, progressive muscle relaxation) right before going to bed might also help. If she tends to have difficulty returning to sleep in the night or in  falling asleep due to worrying, cognitive strategies, which could be discussed with a therapist may also be useful.   Given her ongoing symptoms of depression and anxiety, it is recommended tthat Ms. Patton consider engaging in psychotherapy services to address her mood symptoms. As she was open to this, a referral has been placed and she should be receiving a call within the next few days. She may also call the [facility] Mental [facility] access line to schedule anytime at [phone].  In addition, all three of the providers below offer psychotherapeutic services, if Ms. Patton is having difficulty obtaining an appointment with [facility] in a timely manner:  [facility]  -[email or web address]  -[phone](all locations)  [facility]  -[facility]  -Multiple locations with individual phone numbers that can be found online  [facility]  -[email or web address]  -Multiple locations with individual phone numbers that can be found online  In the meantime, behavioral activation techniques such as regular exercise (under the guidance of her physician), recreational activities and regular social interaction would likely be effective in helping Ms. Patton to manage her mood.   It is important that Ms. Patton continue to adhere to her medication treatment regimen and follow a healthy diet so as to maintain her physical health, as this can have a significant impact on her physical, emotional, and cognitive functioning.   Memory and Organization  Ms. Patton is encouraged to continue to engage in stimulating activities, (i.e., reading, card games, puzzles) to keep her cognitively active.   Ms. Patton did not demonstrate memory impairments on testing; however, such difficulties are more likely to appear when she is especially tired/ fatigued, in pain or struggling with mood symptoms. In those situations, she will benefit from the following strategies.  In her daily life, Ms. Patton will continue to  benefit from the use of compensation techniques. That is, she may find it helpful to post reminder notes around the house, make lists, and carry a small calendar so that she can feel more comfortable and confident in her ability to remember information. A daily planner could also be used as a memory book where important information is recorded and organized for future reference.   Ms. Patton should also create a system to establish set locations for certain items (i.e., keys) such that she always knows where to put them upon entering the house and where to look when she needs them. If she would like to keep certain items out of sight (i.e., a wallet), she could set up a specific hidden place to keep items and use that same place so as to ensure she can find the required item when needed.   When required to learn new information, Ms. Patton does best when information is placed within a meaningful context.  Learning will be facilitated if it is made personally meaningful for her and is not presented in a rote fashion (i.e., a list of unrelated information or unconnected tasks to accomplish).   Follow-up  Re-evaluation in 1-2 years is recommended should cognitive concerns persist after mood and sleep are better controlled.  However if cognition improves, no specific follow-up with Neuropsychology is recommended.  Should cognitive concerns arise in the future, re-evaluation is recommended and current results can be used as a baseline for future comparison.

## 2023-10-16 NOTE — COMMUNITY RESOURCES LIST (ENGLISH)
10/16/2023   [facility]  N/A  For questions about this resource list or additional care needs, please contact your primary care clinic or care manager.  Phone: [phone]   Email: N/A   Address: [address] [place] [address]   Hours: N/A        Financial Stability       Utility payment assistance  1  [facility] [employer] ([facility]) - [place] Office - Supportive Housing Assistance Program (SHAP) - Utility payment assistance Distance: 9.48 miles      Phone/Virtual   [address] [place] [address]  Language: English, Hmong, Kay, Jordanian  Hours: Mon - Fri 8:30 AM - 5:00 PM  Fees: Free   Phone: (679) 443-2922 Email: mumtaz@ong.org Website: http://www.[employer].org/[facility]-impact-areas/     2  [facility] Distance: 10.28 miles      Phone/Virtual   [address] [address]  Language: English, Bruneian  Hours: Mon - Thu 8:00 AM - 4:00 PM , Fri 8:00 AM - 12:00 PM  Fees: Free   Phone: (245) 213-5072 Email: ecc@[employer].org Website: http://energySomaLogic.org/          Important Numbers & Websites       Emergency Services   911  City Services   311  Poison Control   (293) 630-4405  Suicide Prevention Lifeline   (879) 653-4564 (TALK)  Child Abuse Hotline   (134) 101-6974 (4-A-Child)  Sexual Assault Hotline   (308) 745-7811 (HOPE)  National Runaway Safeline   (130) 342-9439 (RUNAWAY)  All-Options Talkline   (159) 826-9755  Substance Abuse Referral   (859) 564-1053 (HELP)

## 2023-10-17 ENCOUNTER — OFFICE VISIT (OUTPATIENT)
Dept: INTERNAL MEDICINE | Facility: CLINIC | Age: 68
End: 2023-10-17
Payer: MEDICARE

## 2023-10-17 VITALS
OXYGEN SATURATION: 98 % | HEART RATE: 61 BPM | BODY MASS INDEX: 17.74 KG/M2 | SYSTOLIC BLOOD PRESSURE: 118 MMHG | TEMPERATURE: 97.8 F | HEIGHT: 66 IN | RESPIRATION RATE: 14 BRPM | WEIGHT: 110.4 LBS | DIASTOLIC BLOOD PRESSURE: 88 MMHG

## 2023-10-17 DIAGNOSIS — R41.3 MEMORY CHANGES: ICD-10-CM

## 2023-10-17 DIAGNOSIS — R43.2 ABNORMAL TASTE IN MOUTH: ICD-10-CM

## 2023-10-17 DIAGNOSIS — F51.01 PRIMARY INSOMNIA: ICD-10-CM

## 2023-10-17 DIAGNOSIS — N95.2 POSTMENOPAUSAL ATROPHIC VAGINITIS: Primary | ICD-10-CM

## 2023-10-17 DIAGNOSIS — K20.0 EOSINOPHILIC ESOPHAGITIS: ICD-10-CM

## 2023-10-17 PROCEDURE — 99214 OFFICE O/P EST MOD 30 MIN: CPT | Performed by: INTERNAL MEDICINE

## 2023-10-17 RX ORDER — TAFLUPROST OPTHALMIC 0 MG/.3ML
SOLUTION/ DROPS OPHTHALMIC
COMMUNITY
Start: 2015-08-01

## 2023-10-17 NOTE — COMMUNITY RESOURCES LIST (ENGLISH)
10/17/2023   [facility] - Outpatient Clinics  N/A  For additional resource needs, please contact your health insurance member services or your primary care team.  Phone: [phone]   Email: N/A   Address: [facility] [place] [address]   Hours: N/A        Financial Stability       Utility payment assistance  1  [place] [facility] - [facility] and Util[facility] Distance: 11.11 miles      In-Person, Phone/Virtual   [address] [place] [address]  Language: English  Hours: Mon - Fri 8:30 AM - 4:30 PM  Fees: Free   Phone: (923) 752-1073 Website: https://[place].gov/[facility]/energy/consumer-assistance/energy-assistance-program/     2  [place] [employer] [facility] [place]'s Telephone Assistance Plan (TAP) and [facility] Lifeline and Affordable Connectivity Program (ACP) Distance: 11.18 miles      Phone/Virtual   [address] [place] [address]  Language: English  Fees: Free   Phone: (148) 213-7378 Email: roberto.beatriz@[facility].mn. Website: https://[place]/puc/consumers/telephone/          Important Numbers & Websites       [facility] [place] [facility]   211 211unitedway.org  Poison Control   (135) 633-9514 [place]poison.[place]  Suicide and Crisis Lifeline   [address] [place], [place] [address]line.org  [facility] [place] Child Abuse Hotline   915.982.7641 Childhelphotline.org  National Sexual Assault Hotline   (486) 620-9052 (HOPE) Rainn.org  National Runaway Safeline   (828) 401-8181 (RUNAWAY) 1800runaway.org  Pregnancy & Postpartum Support [place]   Call/text 270-687-7527 Ppsupportmn.org  Substance Abuse National Helpline ([facility]   546-542-HELP (0284) Findtreatment.gov  Emergency Services   911

## 2023-10-17 NOTE — PATIENT INSTRUCTIONS
[phone] - phone number for our ENT.     Start drinking glass of water first thing in the morning.     1000 international units of vitamin D3.     1200 mg of calcium daily (combined in diet and supplements).     Try adding daily protein drink or supplements.     OB/GYN referral was placed today.     Get in contact with GI to ask about possible scope - wonder if bad taste is due to reflux from uncontrolled EOE.

## 2023-10-17 NOTE — PROGRESS NOTES
Assessment & Plan   Problem List Items Addressed This Visit          Nervous and Auditory    Abnormal taste in mouth     She continues to struggle with abnormal sour/salty taste in her mouth, this started ~2/2023. Did not improve with increasing PPI. That being said, given h/o EOE I am suspicious that reflux is still the culprit.   - Instructed her to call GI to get f/up appt, see if they have other management ideas or would do repeat EGD to evaluate EOE contorl            Digestive    Eosinophilic esophagitis     Continues to have sour taste in her mouth as noted elsewhere that did not improve with increasing omeprazole to BID.    - Instructed her to call GI for further evaluation and possible repeat upper endoscopy.   - Continue omeprazole 20 mg (can decrease to daily)   - Continue budesonide 1.5 mg BID            Urinary    Postmenopausal atrophic vaginitis - Primary     Continues to have irritation and burning despite more consistent estrace cream use.   - Again stressed importance of consistent use  - GYN referral for any further thoughts or recommendations         Relevant Orders    Ob/Gyn  Referral       Other    Insomnia     Since our last visit has totally tapered off Xanax. Now using Unisom 2-3 nights a week with at least stable sleep.   - Stay off Xanax or trazodone  - Continue to work on sleep hygiene  - Okay to continue PRN unisom  - Sleep appt scheduled November          Memory changes     Neuropsych evaluation did not show any significant cognitive impairment, she was very relieved with these results. Thought lapses are likely in s/o insomnia/sleep issues and anxiety.   - Sleep medicine appt scheduled for November              Prescription drug management     FUTURE APPOINTMENTS:       - Follow-up visit in 3 months     Manasa Watson MD  [facility] Alaniz is a 68 year old, presenting for the following health issues:  Follow Up         "10/17/2023     1:43 PM   Additional Questions   Roomed by Cherri RODGERS CMA   Accompanied by N/A       History of Present Illness     Reason for visit:  Follow-up    Saw ENT 8/18/23 - noted L hearing loss, thought reflux is cause of sour taste    Neuropsych testing was unremarkable, recommended sleep medicine and therapy. Sleep appt is scheduled in November.     Insomnia: Now using unisom two to three times a week. No longer using xanax or trazodone. Able to fall asleep and stay asleep a little better. Not as groggy in the morning.     Atrophic vaginitis: Has been consistently using estrace cream but still having symptoms. Wonders about discussing with GYN.     EOE: Continues on omeprazole 20 mg BID and budesonide 1.5 mg BID    She eats 2-3 servings of fruits and vegetables daily.She consumes 0 sweetened beverage(s) daily.She exercises with enough effort to increase her heart rate 20 to 29 minutes per day.  She exercises with enough effort to increase her heart rate 5 days per week.   She is taking medications regularly.    The 10-year ASCVD risk score (Liberty COBB, et al., 2019) is: 6%    Values used to calculate the score:      Age: 68 years      Sex: Female      Is Non- : No      Diabetic: No      Tobacco smoker: No      Systolic Blood Pressure: 118 mmHg      Is BP treated: No      HDL Cholesterol: 93 mg/dL      Total Cholesterol: 236 mg/dL       Review of Systems   Constitutional, HEENT, cardiovascular, pulmonary, gi and gu systems are negative, except as otherwise noted.      Objective    /88 (BP Location: Left arm, Patient Position: Sitting, Cuff Size: Adult Regular)   Pulse 61   Temp 97.8  F (36.6  C) (Oral)   Resp 14   Ht 1.676 m (5' 6\")   Wt 50.1 kg (110 lb 6.4 oz)   LMP  (LMP Unknown)   SpO2 98%   BMI 17.82 kg/m    Body mass index is 17.82 kg/m .  Physical Exam   GENERAL: healthy, alert and no distress  EYES: Eyes grossly normal to inspection and conjunctivae and sclerae " normal  HENT: nose and mouth without ulcers or lesions  RESP: breathing comfortably and speaking in full sentences on room air with no respiratory distress or coughing  CV: warm and well perfused  SKIN: no suspicious lesions or rashes on exposed skin  NEURO: No focal deficits, mentation intact and speech normal  PSYCH: mentation appears normal, affect normal/bright

## 2023-10-19 NOTE — ASSESSMENT & PLAN NOTE
Since our last visit has totally tapered off Xanax. Now using Unisom 2-3 nights a week with at least stable sleep.   - Stay off Xanax or trazodone  - Continue to work on sleep hygiene  - Okay to continue PRN unisom  - Sleep appt scheduled November

## 2023-10-19 NOTE — ASSESSMENT & PLAN NOTE
Continues to have sour taste in her mouth as noted elsewhere that did not improve with increasing omeprazole to BID.    - Instructed her to call GI for further evaluation and possible repeat upper endoscopy.   - Continue omeprazole 20 mg (can decrease to daily)   - Continue budesonide 1.5 mg BID

## 2023-10-19 NOTE — ASSESSMENT & PLAN NOTE
Neuropsych evaluation did not show any significant cognitive impairment, she was very relieved with these results. Thought lapses are likely in s/o insomnia/sleep issues and anxiety.   - Sleep medicine appt scheduled for November

## 2023-10-19 NOTE — ASSESSMENT & PLAN NOTE
Continues to have irritation and burning despite more consistent estrace cream use.   - Again stressed importance of consistent use  - GYN referral for any further thoughts or recommendations

## 2023-11-27 ASSESSMENT — SLEEP AND FATIGUE QUESTIONNAIRES
HOW LIKELY ARE YOU TO NOD OFF OR FALL ASLEEP WHILE WATCHING TV: MODERATE CHANCE OF DOZING
HOW LIKELY ARE YOU TO NOD OFF OR FALL ASLEEP WHEN YOU ARE A PASSENGER IN A CAR FOR AN HOUR WITHOUT A BREAK: HIGH CHANCE OF DOZING
HOW LIKELY ARE YOU TO NOD OFF OR FALL ASLEEP IN A CAR, WHILE STOPPED FOR A FEW MINUTES IN TRAFFIC: WOULD NEVER DOZE
HOW LIKELY ARE YOU TO NOD OFF OR FALL ASLEEP WHILE SITTING INACTIVE IN A PUBLIC PLACE: MODERATE CHANCE OF DOZING
HOW LIKELY ARE YOU TO NOD OFF OR FALL ASLEEP WHILE SITTING AND READING: MODERATE CHANCE OF DOZING
HOW LIKELY ARE YOU TO NOD OFF OR FALL ASLEEP WHILE SITTING AND TALKING TO SOMEONE: SLIGHT CHANCE OF DOZING
HOW LIKELY ARE YOU TO NOD OFF OR FALL ASLEEP WHILE LYING DOWN TO REST IN THE AFTERNOON WHEN CIRCUMSTANCES PERMIT: HIGH CHANCE OF DOZING
HOW LIKELY ARE YOU TO NOD OFF OR FALL ASLEEP WHILE SITTING QUIETLY AFTER LUNCH WITHOUT ALCOHOL: MODERATE CHANCE OF DOZING

## 2023-11-29 ENCOUNTER — OFFICE VISIT (OUTPATIENT)
Dept: SLEEP MEDICINE | Facility: CLINIC | Age: 68
End: 2023-11-29
Attending: INTERNAL MEDICINE
Payer: MEDICARE

## 2023-11-29 VITALS
BODY MASS INDEX: 16.67 KG/M2 | HEIGHT: 68 IN | WEIGHT: 110 LBS | HEART RATE: 56 BPM | SYSTOLIC BLOOD PRESSURE: 124 MMHG | RESPIRATION RATE: 20 BRPM | OXYGEN SATURATION: 100 % | DIASTOLIC BLOOD PRESSURE: 78 MMHG

## 2023-11-29 DIAGNOSIS — R53.82 CHRONIC FATIGUE: ICD-10-CM

## 2023-11-29 DIAGNOSIS — R06.83 SNORING: ICD-10-CM

## 2023-11-29 DIAGNOSIS — F51.01 PRIMARY INSOMNIA: Primary | ICD-10-CM

## 2023-11-29 DIAGNOSIS — R41.3 MEMORY CHANGES: ICD-10-CM

## 2023-11-29 PROCEDURE — 99204 OFFICE O/P NEW MOD 45 MIN: CPT | Performed by: INTERNAL MEDICINE

## 2023-11-29 NOTE — PROGRESS NOTES
Additional 15 minutes on the date of service was spent performing the following:    -Preparing to see the patient  -Obtaining and/or reviewing separately obtained history   -Ordering medications, tests, or procedures   -Documenting clinical information in the electronic or other health record     Assessment and Plan:    In summary Katty Patton is a 68 year old year old female here for sleep disturbance.  1.  Primary insomnia/Chronic Fatigue/Snoring/Memory Changes   Katty Patton has high risk for obstructive sleep apnea based on the history of chronic fatigue, snoring and a crowded airway. I educated the patient on the underlying pathophysiology of obstructive sleep apnea. We reviewed the risks associated with sleep apnea, including increased cardiovascular risk and overall death. We talked about treatments briefly. I recommend getting an split-night nocturnal polysomnography. The patient should return to the clinic to discuss results and treatment option in a patient-centered approach.    History of present illness:    She is a 68 year old female who comes to the clinic with a chief complaint of sleep maintenance insomnia that has been going on for more than a year.  While the patient's  denies any episodes of witnessed apnea he has seen his wife snoring intermittently.  Even if she gets an adequate amount of sleep, she would complain of fatigue upon awakening.  They were concerned about her memories worsening and feel that poor sleep quality may be an exacerbating factor.     Sleep-Wake Cycle:    TIME IN BED:    1) Work/School Days:    Do you work or go to school? No   What time do you usually get into bed? 10:30   About how long does it take you to fall asleep? 10 MINUTES   How often do you have trouble falling asleep? 3-4 nights   How often do you wake up during the night? 2x   Do you work days/evenings/nights/rotating shifts?    What wakes you up at night? Use the bathroom    Anxiety   How  often do you have trouble falling back to sleep? 4 times   About how long does it take to fall back to sleep? 10 - 30+   What do you usually do if you have trouble getting back to sleep? keep trying   What time do you usually get out of bed to start your day? 7 - 7:45   Do you use an alarm? No   2) Weekends/Non-work Days/All Other Days    What time do you usually get into bed? 10 -10:45   About how long does it take you to fall asleep? 10 -30 minutes   What time do you usually get out of bed to start your day? 7 - 7;45   Do you use an alarm? No   SLEEP NEED    On average, about how much sleep do you think you get? 5 - 6 hours   About how much sleep do you think you need? 7 - 8   SLEEP POSITION    Which sleep positions do you prefer? Back    Side   Do you do any of the following activities in bed?    How often do you take a nap on purpose? 4 - 6   About how long are your naps? 15 - 30 minutes   Do you feel better after naps? Yes   How often do you doze off unintentionally? 0   Have you ever had a driving accident or near-miss due to sleepiness/drowsiness? No     Stop Bang Questionnaire    Question 11/27/2023  8:58 AM CST - Filed by Patient   Do you SNORE loudly (louder than talking or loud enough to be heard through closed doors)? No   Do you often feel TIRED, fatigued, or sleepy during daytime? Yes   Has anyone OBSERVED you stop breathing during your sleep? No   Do you have or are you being treated for high blood PRESSURE? No   BMI more than 35kg/m2? No   AGE over 50 years old? Yes   NECK circumference > 16 inches (40cm)? No   GENDER: Male? No   STOP-BANG Total Score (range: 0 - 8) 3 (High risk of SAGAR) Critical        EFRA:  EFRA Total Score: 18  Total score - [place]: 15 (11/27/2023  8:56 AM)    Patient told to return in one week after the sleep study is interpreted.    Patient Active Problem List   Diagnosis    Primary open angle glaucoma of both eyes, moderate stage    Nuclear sclerosis of both eyes    Benign  neoplasm of colon    Cervicalgia    Bradycardia    Diverticular disease of colon    Dysphagia    Eosinophilic esophagitis    Glaucoma    Insomnia    Postmenopausal atrophic vaginitis    Premature atrial complexes    Symptomatic menopausal or female climacteric states    Encounter for Medicare annual wellness exam    Raynaud's syndrome without gangrene    Oral thrush    Memory changes    Abnormal taste in mouth    Impacted cerumen of left ear       Past Medical History  Past Medical History:   Diagnosis Date    Bradycardia 8/14/2015    Asymptomatic Possibly related to vagal tone    Esophageal rupture     Glaucoma (increased eye pressure)     Hypertension     new no meds    Nuclear sclerosis of both eyes     Pre-syncope 8/14/2015    Occasional episodes in her teens and as an adult        Past Surgical History  Past Surgical History:   Procedure Laterality Date    GLAUCOMA SURGERY      ZZC LIGATE FALLOPIAN TUBE      Description: Tubal Ligation;  Recorded: 09/18/2009;        Meds  Current Outpatient Medications   Medication Sig Dispense Refill    estradiol (ESTRACE VAGINAL) 0.1 MG/GM vaginal cream Half to one applicator three times weekly 42.5 g 3    omeprazole (PRILOSEC) 40 MG DR capsule Take 1 capsule (40 mg) by mouth daily 90 capsule 1    tafluprost (ZIOPTAN) 0.0015 % SOLN ophthalmic solution       timolol, PF, (TIMOPTIC OCUDOSE) 0.5 % ophthalmic solution INSTILL 1 DROP INTO BOTH EYES TWICE A DAY      amLODIPine (NORVASC) 10 MG tablet Take 1 tablet (10 mg) by mouth daily (Patient not taking: Reported on 10/17/2023) 90 tablet 3        Allergies  Alphagan [brimonidine], Dorzolamide, Latanoprost, and Peanuts [nuts]     Social History  Social History     Socioeconomic History    Marital status:      Spouse name: Not on file    Number of children: Not on file    Years of education: Not on file    Highest education level: Not on file   Occupational History    Not on file   Tobacco Use    Smoking status: Never     Smokeless tobacco: Never   Substance and Sexual Activity    Alcohol use: Yes     Comment: Alcoholic Drinks/day: occ    Drug use: No    Sexual activity: Yes     Partners: Male   Other Topics Concern    Not on file   Social History Narrative    Not on file     Social Determinants of Health     Financial Resource Strain: High Risk (10/16/2023)    Financial Resource Strain     Within the past 12 months, have you or your family members you live with been unable to get utilities (heat, electricity) when it was really needed?: Yes   Food Insecurity: Low Risk  (10/16/2023)    Food Insecurity     Within the past 12 months, did you worry that your food would run out before you got money to buy more?: No     Within the past 12 months, did the food you bought just not last and you didn t have money to get more?: No   Transportation Needs: Low Risk  (10/16/2023)    Transportation Needs     Within the past 12 months, has lack of transportation kept you from medical appointments, getting your medicines, non-medical meetings or appointments, work, or from getting things that you need?: No   Physical Activity: Not on file   Stress: Not on file   Social Connections: Not on file   Interpersonal Safety: Low Risk  (10/17/2023)    Interpersonal Safety     Do you feel physically and emotionally safe where you currently live?: Yes     Within the past 12 months, have you been hit, slapped, kicked or otherwise physically hurt by someone?: No     Within the past 12 months, have you been humiliated or emotionally abused in other ways by your partner or ex-partner?: No   Housing Stability: Low Risk  (10/16/2023)    Housing Stability     Do you have housing? : Yes     Are you worried about losing your housing?: No        Family History  Family History   Problem Relation Age of Onset    Diabetes Mother     Hypertension Mother     Coronary Artery Disease Mother 61    Diabetes Type 2  Mother     Hypertension Father     Sudden Death Father      "Snoring Father     No Known Problems Sister     Hypertension Brother     No Known Problems Brother     No Known Problems Brother     No Known Problems Brother     No Known Problems Brother     Breast Cancer Maternal Aunt 62    Dementia Maternal Aunt     Cancer No family hx of     Glaucoma No family hx of     Macular Degeneration No family hx of     Eye Surgery No family hx of        Review of Systems:  Constitutional: Negative except as noted in HPI.   Eyes: Negative except as noted in HPI.   ENT: Negative except as noted in HPI.   Cardiovascular: Negative except as noted in HPI.   Respiratory: Negative except as noted in HPI.   Gastrointestinal: Negative except as noted in HPI.   Genitourinary: Negative except as noted in HPI.   Musculoskeletal: Negative except as noted in HPI.   Integumentary: Negative except as noted in HPI.   Neurological: Negative except as noted in HPI.   Psychiatric: Negative except as noted in HPI.   Endocrine: Negative except as noted in HPI.   Hematologic/Lymphatic: Negative except as noted in HPI.      Physical Exam:  /78   Pulse 56   Resp 20   Ht 1.715 m (5' 7.5\")   Wt 49.9 kg (110 lb)   LMP  (LMP Unknown)   SpO2 100%   BMI 16.97 kg/m    BMI:Body mass index is 16.97 kg/m .   GEN: NAD, appropriate for age  Head: Normocephalic.  EYES: PERRLA, EOMI  ENT: Oropharynx is clear, Esposito class 4+ airway.   Nasal mucosa is moist without erythema  Neck : Thyroid is within normal limits.   CV: Regular rate and rhythm, S1 & S2 positive.  LUNGS: Bilateral breathsounds heard.   ABDOMEN: Positive bowel sounds in all quadrants, soft, no rebound or guarding  MUSCULOSKELETAL: No leg swelling  SKIN: warm, dry, no rashes  Neurological: Alert, Gait is normal. Strength 5/5 in all extremities.  Psych: normal mood, normal affect     Labs/Studies:     No results found for: \"PH\", \"PHARTERIAL\", \"PO2\", \"RX9MPVBMEFM\", \"SAT\", \"PCO2\", \"HCO3\", \"BASEEXCESS\", \"SOSA\", \"BEB\"  Lab Results   Component Value Date " "   TSH 1.34 05/30/2023    TSH 0.82 09/10/2021     Lab Results   Component Value Date    GLC 99 02/07/2023    GLC 89 09/10/2021     Lab Results   Component Value Date    HGB 13.7 05/30/2023    HGB 13.3 02/07/2023     Lab Results   Component Value Date    BUN 7.4 (L) 02/07/2023    BUN 9 09/10/2021    CR 0.72 02/07/2023    CR 0.69 09/10/2021     Lab Results   Component Value Date    AST 27 02/07/2023    AST 24 09/10/2021    ALT 20 02/07/2023    ALT 18 09/10/2021    ALKPHOS 48 02/07/2023    ALKPHOS 41 (L) 09/10/2021    BILITOTAL 0.6 02/07/2023    BILITOTAL 0.7 09/10/2021     No results found for: \"UAMP\", \"UBARB\", \"BENZODIAZEUR\", \"UCANN\", \"UCOC\", \"OPIT\", \"UPCP\"      Patient verbalized understanding of these issues, agrees with the plan and all questions were answered today. Patient was given an opportuntity to voice any other symptoms or concerns not listed above. Patient did not have any other symptoms or concerns.         Sterling Baptiste DO,   Board Certified in Internal Medicine and Sleep Medicine    (Note created with Dragon voice recognition and unintended spelling errors and word substitutions may occur)     "

## 2023-11-29 NOTE — PATIENT INSTRUCTIONS
Patient education: What is a sleep study?     What is a sleep study? -- A sleep study is a test that measures how well you sleep and checks for sleep problems. For some sleep studies, you stay overnight in a sleep lab at a hospital or sleep center.     What happens during a sleep study? -- Before you go to sleep, a technician attaches small, sticky patches called  electrodes  to your head, chest, and legs. He or she will also place a small tube beneath your nose and might wrap 1 or 2 belts around your chest.   Each of these items has wires that connect to monitors. The monitors record your movement, brain activity, breathing, and other body functions while you sleep.  If you have a history of trouble falling asleep, your doctor might prescribe a medicine to help you fall asleep in the lab. If you have never taken the medicine before, your doctor might ask you take it on a night before your sleep study to see how it affects you.   Why might my doctor order a sleep study? -- Your doctor will order a sleep study if he or she thinks you have sleep apnea or a different condition that makes you:   ?Have sudden jerking leg movements while you sleep, called  periodic limb movements.    ?Feel very sleepy during the day and fall asleep all of a sudden, called  narcolepsy.    ?Have trouble falling asleep or staying asleep over a long period of time, called  chronic insomnia.    ?Do odd things while you sleep, such as walking.  How should I prepare for a sleep study? -- On the day of your sleep study, you should:   ?Avoid alcohol   ?Avoid drinking coffee, tea, sodas, and other drinks that have caffeine in the afternoon and evening   ?Take all of your regular medicines     The cost of care estimate line is [phone]. They are able to give the patient an estimate of the charges and also an estimate of their insurance coverage/patient responsibility.   After your sleep study is performed, please call us at [phone] or  [phone]  to schedule for a follow up to review the results of the sleep study.    Please bring one tab of low dose melatonin 3 mg or less to the night of the study.    Melatonin intake is completely voluntary.    You may take own melatonin after arrival to sleep center. Do not drive or operate machinery after intake of melatonin.     Please make every effort you can to sleep on your back with one pillow behind your head.    Please also call your insurance company next week to see if your sleep study is approved and find out your co-pay.

## 2023-11-29 NOTE — NURSING NOTE
"Chief Complaint   Patient presents with    Consult For     Primary insomnia       Initial /78   Pulse 56   Resp 20   Ht 1.715 m (5' 7.5\")   Wt 49.9 kg (110 lb)   LMP  (LMP Unknown)   SpO2 100%   BMI 16.97 kg/m   Estimated body mass index is 16.97 kg/m  as calculated from the following:    Height as of this encounter: 1.715 m (5' 7.5\").    Weight as of this encounter: 49.9 kg (110 lb).    Medication Reconciliation: complete    Neck circumference:  inches / 32 centimeters.    DME:       Dorcas Olguin MA  [facility]    "

## 2023-11-29 NOTE — NURSING NOTE
Sleep study and return visit has been schedule. AVS printed and Sleep Study  Packet/letter given to patient.      Dorcas Olguin MA  [facility]

## 2023-12-11 ENCOUNTER — TRANSFERRED RECORDS (OUTPATIENT)
Dept: HEALTH INFORMATION MANAGEMENT | Facility: CLINIC | Age: 68
End: 2023-12-11

## 2023-12-11 ENCOUNTER — LAB REQUISITION (OUTPATIENT)
Dept: LAB | Facility: CLINIC | Age: 68
End: 2023-12-11
Payer: MEDICARE

## 2023-12-11 DIAGNOSIS — Z12.4 ENCOUNTER FOR SCREENING FOR MALIGNANT NEOPLASM OF CERVIX: ICD-10-CM

## 2023-12-11 PROCEDURE — G0145 SCR C/V CYTO,THINLAYER,RESCR: HCPCS | Mod: ORL | Performed by: OBSTETRICS & GYNECOLOGY

## 2023-12-11 PROCEDURE — 87624 HPV HI-RISK TYP POOLED RSLT: CPT | Mod: ORL | Performed by: OBSTETRICS & GYNECOLOGY

## 2023-12-12 ENCOUNTER — TRANSFERRED RECORDS (OUTPATIENT)
Dept: HEALTH INFORMATION MANAGEMENT | Facility: CLINIC | Age: 68
End: 2023-12-12
Payer: MEDICARE

## 2023-12-13 ENCOUNTER — PATIENT OUTREACH (OUTPATIENT)
Dept: GASTROENTEROLOGY | Facility: CLINIC | Age: 68
End: 2023-12-13
Payer: MEDICARE

## 2023-12-13 LAB
BKR LAB AP GYN ADEQUACY: NORMAL
BKR LAB AP GYN INTERPRETATION: NORMAL
BKR LAB AP HPV REFLEX: NORMAL
BKR LAB AP LMP: NORMAL
BKR LAB AP PREVIOUS ABNL DX: NORMAL
BKR LAB AP PREVIOUS ABNORMAL: NORMAL
PATH REPORT.COMMENTS IMP SPEC: NORMAL
PATH REPORT.COMMENTS IMP SPEC: NORMAL
PATH REPORT.RELEVANT HX SPEC: NORMAL

## 2023-12-14 LAB
HUMAN PAPILLOMA VIRUS 16 DNA: NEGATIVE
HUMAN PAPILLOMA VIRUS 18 DNA: NEGATIVE
HUMAN PAPILLOMA VIRUS FINAL DIAGNOSIS: NORMAL
HUMAN PAPILLOMA VIRUS OTHER HR: NEGATIVE

## 2023-12-25 ENCOUNTER — MYC MEDICAL ADVICE (OUTPATIENT)
Dept: INTERNAL MEDICINE | Facility: CLINIC | Age: 68
End: 2023-12-25
Payer: MEDICARE

## 2023-12-25 DIAGNOSIS — K20.0 EOSINOPHILIC ESOPHAGITIS: Primary | ICD-10-CM

## 2024-01-03 ENCOUNTER — MYC MEDICAL ADVICE (OUTPATIENT)
Dept: INTERNAL MEDICINE | Facility: CLINIC | Age: 69
End: 2024-01-03
Payer: MEDICARE

## 2024-01-03 DIAGNOSIS — I73.00 RAYNAUD'S SYNDROME WITHOUT GANGRENE: ICD-10-CM

## 2024-01-03 RX ORDER — AMLODIPINE BESYLATE 10 MG/1
10 TABLET ORAL DAILY
Qty: 90 TABLET | Refills: 3 | Status: SHIPPED | OUTPATIENT
Start: 2024-01-03

## 2024-01-11 ENCOUNTER — TRANSFERRED RECORDS (OUTPATIENT)
Dept: HEALTH INFORMATION MANAGEMENT | Facility: CLINIC | Age: 69
End: 2024-01-11
Payer: MEDICARE

## 2024-01-24 ENCOUNTER — OFFICE VISIT (OUTPATIENT)
Dept: INTERNAL MEDICINE | Facility: CLINIC | Age: 69
End: 2024-01-24
Payer: MEDICARE

## 2024-01-24 ENCOUNTER — TELEPHONE (OUTPATIENT)
Dept: INTERNAL MEDICINE | Facility: CLINIC | Age: 69
End: 2024-01-24

## 2024-01-24 VITALS
RESPIRATION RATE: 16 BRPM | HEIGHT: 66 IN | DIASTOLIC BLOOD PRESSURE: 64 MMHG | SYSTOLIC BLOOD PRESSURE: 128 MMHG | WEIGHT: 111.3 LBS | BODY MASS INDEX: 17.89 KG/M2

## 2024-01-24 DIAGNOSIS — L65.9 HAIR THINNING: Primary | ICD-10-CM

## 2024-01-24 DIAGNOSIS — K20.0 EOSINOPHILIC ESOPHAGITIS: ICD-10-CM

## 2024-01-24 DIAGNOSIS — I73.00 RAYNAUD'S SYNDROME WITHOUT GANGRENE: ICD-10-CM

## 2024-01-24 DIAGNOSIS — N95.2 POSTMENOPAUSAL ATROPHIC VAGINITIS: ICD-10-CM

## 2024-01-24 DIAGNOSIS — Z13.220 SCREENING FOR HYPERLIPIDEMIA: ICD-10-CM

## 2024-01-24 DIAGNOSIS — R43.2 ABNORMAL TASTE IN MOUTH: ICD-10-CM

## 2024-01-24 PROCEDURE — 99213 OFFICE O/P EST LOW 20 MIN: CPT | Performed by: INTERNAL MEDICINE

## 2024-01-24 ASSESSMENT — ENCOUNTER SYMPTOMS
NAUSEA: 1
HEARTBURN: 0
ABDOMINAL PAIN: 0
ARTHRALGIAS: 0
SHORTNESS OF BREATH: 1
DYSURIA: 0
DIZZINESS: 1
CHILLS: 0
HEMATOCHEZIA: 0
MYALGIAS: 0
EYE PAIN: 0
CONSTIPATION: 0
HEMATURIA: 0
COUGH: 0
PARESTHESIAS: 0
DIARRHEA: 0
WEAKNESS: 0
FEVER: 0
SORE THROAT: 0
FREQUENCY: 0
PALPITATIONS: 0
HEADACHES: 0
NERVOUS/ANXIOUS: 0
BREAST MASS: 0
JOINT SWELLING: 0

## 2024-01-24 ASSESSMENT — ACTIVITIES OF DAILY LIVING (ADL): CURRENT_FUNCTION: NO ASSISTANCE NEEDED

## 2024-01-24 NOTE — ASSESSMENT & PLAN NOTE
Continues to struggle with this.  Has been present for about a year at this point.  Did see [facility] 1/11/2024, Dr. Pugh.   - Agree with trial of omeprazole to BID  - F/up at next visit

## 2024-01-24 NOTE — PROGRESS NOTES
Assessment & Plan   Problem List Items Addressed This Visit          Nervous and Auditory    Abnormal taste in mouth     Continues to struggle with this.  Has been present for about a year at this point.  Did see [facility] 1/11/2024, Dr. Pugh.   - Agree with trial of omeprazole to BID  - F/up at next visit          Relevant Orders    Comprehensive metabolic panel    CBC with platelets       Digestive    Eosinophilic esophagitis     Longstanding and stable.    - Can continue budesonide 1.5 mg BID.   - Will see what AM cortisol is when check by GI as well             Circulatory    Raynaud's syndrome without gangrene     Now that is the winter, okay to continue amlodipine 10 mg daily.            Musculoskeletal and Integumentary    Hair thinning - Primary     Patient has noticed hair thinning more over the last year.  Has been using a biotin supplement, not super helpful yet.  -Will check TSH and ferritin  -Can try nutrafol or viviscal          Relevant Orders    TSH with free T4 reflex    Ferritin       Urinary    Postmenopausal atrophic vaginitis     Estrace cream was recommended again by GYN.  This has been a little bit helpful.          Other Visit Diagnoses       Screening for hyperlipidemia        Relevant Orders    Lipid panel reflex to direct LDL Fasting           Ordering of each unique test  I spent a total of 25 minutes on the day of the visit.   Time spent by me doing chart review, history and exam, documentation and further activities per the note     FUTURE APPOINTMENTS:       - Follow-up visit in 1-2 months for AWV      Subjective   Katty is a 68 year old, presenting for the following health issues:  Imm/Inj        1/24/2024     2:00 PM   Additional Questions   Roomed by Sera DELEON     Katty was originally here for wellness visit, unfortunately too soon as her last one was in February.  We instead discussed update since the last time we have seen her.    Did see OB GYN 12/12/23, started  "estrace cream, they did Pap.     Sour taste: Saw [facility] 1/11/24 recommend to inc omeprazole to BID. Also with weight loss, wanted to check AM cortisol (given long standing budesonide use), also gave zofran for nausea.  She does forget to take the second dose of omeprazole sometimes.  Still having the same sour taste.    Insomnia: Saw , sleep 11/29/23, recommended split-night PSG. This is scheduled for 5/8/24.   - Unisom 1-2 times a week, most times just half a tab, sleeping better     Raynauds: Now that it has been colder, has been using her amlodipine.  This does help some, not when it is extremely cold though.    Hair thinning more in last 6-12 months. Gradually more and more and now thin.  Wondering about biotin supplements.    Weight has been stable.   Wt Readings from Last 4 Encounters:   01/24/24 50.5 kg (111 lb 4.8 oz)   11/29/23 49.9 kg (110 lb)   10/17/23 50.1 kg (110 lb 6.4 oz)   08/08/23 49.4 kg (108 lb 12.8 oz)         Review of Systems  Constitutional, neuro, ENT, endocrine, pulmonary, cardiac, gastrointestinal, genitourinary, musculoskeletal, integument and psychiatric systems are negative, except as otherwise noted.      Objective    /64   Resp 16   Ht 1.676 m (5' 6\")   Wt 50.5 kg (111 lb 4.8 oz)   LMP  (LMP Unknown)   BMI 17.96 kg/m    Body mass index is 17.96 kg/m .  Physical Exam   GENERAL: healthy, alert and no distress  EYES: Eyes grossly normal to inspection and conjunctivae and sclerae normal  HENT: nose and mouth without ulcers or lesions  RESP: breathing comfortably and speaking in full sentences on room air with no respiratory distress or coughing  CV: warm and well perfused  ABDOMEN: soft, nontender  SKIN: no suspicious lesions or rashes on exposed skin  NEURO: No focal deficits, mentation intact and speech normal  PSYCH: mentation appears normal, affect normal/bright          Signed Electronically by: Manasa Watson MD      Answers submitted by the patient for " this visit:  Annual Preventive Visit (Submitted on 1/24/2024)  Chief Complaint: Annual Exam:  abdominal pain: No  Blood in stool: No  Blood in urine: No  chest pain: No  chills: No  congestion: No  constipation: No  cough: No  diarrhea: No  dizziness: Yes  ear pain: No  eye pain: No  nervous/anxious: No  fever: No  frequency: No  genital sores: No  headaches: No  hearing loss: No  heartburn: No  arthralgias: No  joint swelling: No  peripheral edema: No  mood changes: No  myalgias: No  nausea: Yes  dysuria: No  palpitations: No  Skin sensation changes: No  sore throat: No  urgency: No  rash: No  shortness of breath: Yes  visual disturbance: No  weakness: No  pelvic pain: No  vaginal bleeding: No  vaginal discharge: No  tenderness: No  breast mass: No  breast discharge: No

## 2024-01-24 NOTE — ASSESSMENT & PLAN NOTE
Longstanding and stable.    - Can continue budesonide 1.5 mg BID.   - Will see what AM cortisol is when check by GI as well

## 2024-01-24 NOTE — PROGRESS NOTES
Preventive Care Visit  [facility] Sultana Watson MD, Internal Medicine  Jan 24, 2024  {Provider  Link to SmartSet :099903}    SUBJECTIVE:   Katty is a 68 year old, presenting for the following:  Annual Visit    {(!) Visit Details have not yet been documented.  Please enter Visit Details and then use this list to pull in documentation. (Optional):094417}  Are you in the first 12 months of your Medicare coverage?  { :068521}    HPI    Today's PHQ-2 Score:       1/24/2024    11:57 AM   PHQ-2 ( 1999 [employer])   Q1: Little interest or pleasure in doing things 1   Q2: Feeling down, depressed or hopeless 1   PHQ-2 Score 2   Q1: Little interest or pleasure in doing things Several days   Q2: Feeling down, depressed or hopeless Several days   PHQ-2 Score 2           Have you ever done Advance Care Planning? (For example, a Health Directive, POLST, or a discussion with a medical provider or your loved ones about your wishes): { :331569}    {Hearing Test Done (Optional):662785}   Fall risk  { :036281}  {If any of the above assessments are answered yes, consider ordering appropriate referrals (Optional):100133}  Cognitive Screening { :872805}    {Do you have sleep apnea, excessive snoring or daytime drowsiness? (Optional):517692}    Reviewed and updated as needed this visit by clinical staff                  Reviewed and updated as needed this visit by Provider                  Social History     Tobacco Use    Smoking status: Never    Smokeless tobacco: Never   Substance Use Topics    Alcohol use: Yes     Comment: Alcoholic Drinks/day: occ     {Rooming staff  Click this link to complete the Prescreen if response below is not for today's visit  Alcohol Use Prescreen >3 drinks/day or > 7 drinks/week.  If the prescreen question answer is YES, complete the full AUDIT  :767065}        1/20/2024     4:57 PM   Alcohol Use   Prescreen: >3 drinks/day or >7 drinks/week? No   {add AUDIT responses  (Optional) (A score of 7 for adult men is an indication of hazardous drinking; a score of 8 or more is an indication of an alcohol use disorder.  A score of 7 or more for adult women is an indication of hazardous drinking or an alchohol use disorder):067160}  Do you have a current opioid prescription? { :484046}  Do you use any other controlled substances or medications that are not prescribed by a provider? {Substance Use :821196}  {Provider  If there are gaps in the social history shown above, please follow the link and refresh the note Link to Social and Substance History :421471}    {Outside tests to abstract? (Optional):287907}    {additional problems to add (Optional):790516}    Current providers sharing in care for this patient include: {Rooming staff:  Please update Care Team from storyboard, refresh this note and then delete this statement}  Patient Care Team:  Manasa Watson MD as PCP - General  [facility], Isacc Carpio MD as MD [place], Kitty Cobb MD as MD (Ophthalmology)  Manasa Watson MD as Assigned PCP  Tita Mendez, PhD LP as Assigned Behavioral Health Provider  Sterling Baptiste DO as Assigned Sleep Provider    The following health maintenance items are reviewed in Epic and correct as of today:  Health Maintenance   Topic Date Due    ANNUAL REVIEW OF HM ORDERS  Never done    DTAP/TDAP/TD IMMUNIZATION (1 - Tdap) 11/01/2016    MEDICARE ANNUAL WELLNESS VISIT  01/28/2021    FALL RISK ASSESSMENT  01/24/2025    MAMMO SCREENING  03/28/2025    COLORECTAL CANCER SCREENING  07/14/2025    LIPID  02/07/2028    ADVANCE CARE PLANNING  02/07/2028    DEXA  03/28/2038    HEPATITIS C SCREENING  Completed    PHQ-2 (once per calendar year)  Completed    INFLUENZA VACCINE  Completed    Pneumococcal Vaccine: 65+ Years  Completed    ZOSTER IMMUNIZATION  Completed    RSV VACCINE (Pregnancy & 60+)  Completed    COVID-19 Vaccine  Completed    IPV IMMUNIZATION  Aged Out    HPV IMMUNIZATION  Aged  "Out    MENINGITIS IMMUNIZATION  Aged Out    RSV MONOCLONAL ANTIBODY  Aged Out     {Chronicprobdata (optional):085545}  {Decision Support (Optional):600517}    FHS-7:       3/28/2023     8:13 AM   Breast CA Risk Assessment (FHS-7)   Did any of your first-degree relatives have breast or ovarian cancer? No   Did any of your relatives have bilateral breast cancer? No   Did any man in your family have breast cancer? No   Did any woman in your family have breast and ovarian cancer? No   Did any woman in your family have breast cancer before age 50 y? No   Do you have 2 or more relatives with breast and/or ovarian cancer? No   Do you have 2 or more relatives with breast and/or bowel cancer? No     {If any of the questions to the BCRA (FHS-7) are answered yes, consider ordering referral for genetic counseling (Optional) :705989}  {AMB Mammogram Decision Support (Optional) :644981}  Pertinent mammograms are reviewed under the imaging tab.  Review of Systems   {ROS Picklists (Optional):120906}    OBJECTIVE:   LMP  (LMP Unknown)    Estimated body mass index is 16.97 kg/m  as calculated from the following:    Height as of 23: 1.715 m (5' 7.5\").    Weight as of 23: 49.9 kg (110 lb).  Physical Exam  {Exam List (Optional):925859}    {Diagnostic Test Results (Optional):973949}    ASSESSMENT / PLAN:   {Diag Picklist:769826}    {Patient advised of split billing (Optional):569769}      Counseling  {Medicare Counselin}        She reports that she has never smoked. She has never used smokeless tobacco.      Appropriate preventive services were discussed with this patient, including applicable screening as appropriate for fall prevention, nutrition, physical activity, Tobacco-use cessation, weight loss and cognition.  Checklist reviewing preventive services available has been given to the patient.    Reviewed patients plan of care and provided an AVS. The {CarePlan:614193} for Katty meets the Care Plan requirement. " This Care Plan has been established and reviewed with the {PATIENT, FAMILY MEMBER, CAREGIVER:052313}.    {Counseling Resources  [place] Preventive Services Task Force  Cholesterol Screening  Health diet/nutrition  Pooled Cohorts Equation Calculator  [employer] MyPlate  ASA Prophylaxis  Lung CA Screening  Osteoporosis prevention/bone health :217113}  {Breast Cancer Risk Calculator  BRCA-Related Cancer Risk Assessment FHS-7 Tool :909731}    Signed Electronically by: Manasa Watson MD    Identified Health Risks  {Medicare required documentation of substance and opioid use disorders screening :608025}  Answers submitted by the patient for this visit:  Annual Preventive Visit (Submitted on 1/20/2024)  Chief Complaint: Annual Exam:  Blood in stool: No  heartburn: No  peripheral edema: No  mood changes: No  Skin sensation changes: No  tenderness: No  breast mass: No  breast discharge: No

## 2024-01-24 NOTE — TELEPHONE ENCOUNTER
Reason for Call:  Appointment Request    Patient requesting this type of appt:  ASAP    Requested provider: Manasa Watson    Reason patient unable to be scheduled: Not within requested timeframe    When does patient want to be seen/preferred time: After PCP is back from vacation    Comment: PCP gave the okay for pt to be fit into the schedule when they are back from vacation. Must be done by clinic staff.    Could we send this information to you in [employer] or would you prefer to receive a phone call?:   Patient would prefer a phone call   Okay to leave a detailed message?: Yes at Cell number on file:    Telephone Information:   Mobile [phone]       Call taken on 1/24/2024 at 3:22 PM by Emmy Grigsby

## 2024-01-24 NOTE — ASSESSMENT & PLAN NOTE
Patient has noticed hair thinning more over the last year.  Has been using a biotin supplement, not super helpful yet.  -Will check TSH and ferritin  -Can try nutrafol or viviscal

## 2024-01-26 NOTE — TELEPHONE ENCOUNTER
Pt calling, stating that she can't make the 02/23/2024 AWV, she doesn't want to wait until April for it. Any slots we are able to use for pt?

## 2024-01-30 ENCOUNTER — LAB (OUTPATIENT)
Dept: LAB | Facility: CLINIC | Age: 69
End: 2024-01-30
Payer: MEDICARE

## 2024-01-30 DIAGNOSIS — L65.9 HAIR THINNING: ICD-10-CM

## 2024-01-30 DIAGNOSIS — R11.0 NAUSEA: Primary | ICD-10-CM

## 2024-01-30 DIAGNOSIS — Z13.220 SCREENING FOR HYPERLIPIDEMIA: ICD-10-CM

## 2024-01-30 DIAGNOSIS — R43.2 ABNORMAL TASTE IN MOUTH: ICD-10-CM

## 2024-01-30 LAB
ERYTHROCYTE [DISTWIDTH] IN BLOOD BY AUTOMATED COUNT: 12.1 % (ref 10–15)
HCT VFR BLD AUTO: 38.6 % (ref 35–47)
HGB BLD-MCNC: 13.3 G/DL (ref 11.7–15.7)
MCH RBC QN AUTO: 30.3 PG (ref 26.5–33)
MCHC RBC AUTO-ENTMCNC: 34.5 G/DL (ref 31.5–36.5)
MCV RBC AUTO: 88 FL (ref 78–100)
PLATELET # BLD AUTO: 234 10E3/UL (ref 150–450)
RBC # BLD AUTO: 4.39 10E6/UL (ref 3.8–5.2)
WBC # BLD AUTO: 3.9 10E3/UL (ref 4–11)

## 2024-01-30 PROCEDURE — 82728 ASSAY OF FERRITIN: CPT

## 2024-01-30 PROCEDURE — 85027 COMPLETE CBC AUTOMATED: CPT

## 2024-01-30 PROCEDURE — 82533 TOTAL CORTISOL: CPT

## 2024-01-30 PROCEDURE — 84443 ASSAY THYROID STIM HORMONE: CPT

## 2024-01-30 PROCEDURE — 80053 COMPREHEN METABOLIC PANEL: CPT

## 2024-01-30 PROCEDURE — 36415 COLL VENOUS BLD VENIPUNCTURE: CPT

## 2024-01-30 PROCEDURE — 80061 LIPID PANEL: CPT

## 2024-01-31 LAB
ALBUMIN SERPL BCG-MCNC: 4.5 G/DL (ref 3.5–5.2)
ALP SERPL-CCNC: 50 U/L (ref 40–150)
ALT SERPL W P-5'-P-CCNC: 26 U/L (ref 0–50)
ANION GAP SERPL CALCULATED.3IONS-SCNC: 11 MMOL/L (ref 7–15)
AST SERPL W P-5'-P-CCNC: 30 U/L (ref 0–45)
BILIRUB SERPL-MCNC: 0.4 MG/DL
BUN SERPL-MCNC: 9.7 MG/DL (ref 8–23)
CALCIUM SERPL-MCNC: 9.2 MG/DL (ref 8.8–10.2)
CHLORIDE SERPL-SCNC: 103 MMOL/L (ref 98–107)
CHOLEST SERPL-MCNC: 228 MG/DL
CORTIS SERPL-MCNC: 13.9 UG/DL
CREAT SERPL-MCNC: 0.76 MG/DL (ref 0.51–0.95)
DEPRECATED HCO3 PLAS-SCNC: 26 MMOL/L (ref 22–29)
EGFRCR SERPLBLD CKD-EPI 2021: 85 ML/MIN/1.73M2
FASTING STATUS PATIENT QL REPORTED: YES
FERRITIN SERPL-MCNC: 37 NG/ML (ref 11–328)
GLUCOSE SERPL-MCNC: 99 MG/DL (ref 70–99)
HDLC SERPL-MCNC: 96 MG/DL
LDLC SERPL CALC-MCNC: 117 MG/DL
NONHDLC SERPL-MCNC: 132 MG/DL
POTASSIUM SERPL-SCNC: 4 MMOL/L (ref 3.4–5.3)
PROT SERPL-MCNC: 7.3 G/DL (ref 6.4–8.3)
SODIUM SERPL-SCNC: 140 MMOL/L (ref 135–145)
TRIGL SERPL-MCNC: 75 MG/DL
TSH SERPL DL<=0.005 MIU/L-ACNC: 1.2 UIU/ML (ref 0.3–4.2)

## 2024-04-18 ENCOUNTER — TELEPHONE (OUTPATIENT)
Dept: SLEEP MEDICINE | Facility: CLINIC | Age: 69
End: 2024-04-18
Payer: MEDICARE

## 2024-05-05 ENCOUNTER — HEALTH MAINTENANCE LETTER (OUTPATIENT)
Age: 69
End: 2024-05-05

## 2024-05-10 SDOH — HEALTH STABILITY: PHYSICAL HEALTH: ON AVERAGE, HOW MANY MINUTES DO YOU ENGAGE IN EXERCISE AT THIS LEVEL?: 30 MIN

## 2024-05-10 SDOH — HEALTH STABILITY: PHYSICAL HEALTH: ON AVERAGE, HOW MANY DAYS PER WEEK DO YOU ENGAGE IN MODERATE TO STRENUOUS EXERCISE (LIKE A BRISK WALK)?: 5 DAYS

## 2024-05-10 ASSESSMENT — SOCIAL DETERMINANTS OF HEALTH (SDOH): HOW OFTEN DO YOU GET TOGETHER WITH FRIENDS OR RELATIVES?: ONCE A WEEK

## 2024-05-13 ENCOUNTER — OFFICE VISIT (OUTPATIENT)
Dept: INTERNAL MEDICINE | Facility: CLINIC | Age: 69
End: 2024-05-13
Payer: MEDICARE

## 2024-05-13 VITALS
HEIGHT: 67 IN | RESPIRATION RATE: 16 BRPM | BODY MASS INDEX: 17.44 KG/M2 | OXYGEN SATURATION: 99 % | DIASTOLIC BLOOD PRESSURE: 62 MMHG | HEART RATE: 78 BPM | WEIGHT: 111.1 LBS | SYSTOLIC BLOOD PRESSURE: 124 MMHG | TEMPERATURE: 98.1 F

## 2024-05-13 DIAGNOSIS — R41.3 MEMORY CHANGES: ICD-10-CM

## 2024-05-13 DIAGNOSIS — K20.0 EOSINOPHILIC ESOPHAGITIS: ICD-10-CM

## 2024-05-13 DIAGNOSIS — R43.2 ABNORMAL TASTE IN MOUTH: ICD-10-CM

## 2024-05-13 DIAGNOSIS — Z00.00 ENCOUNTER FOR MEDICARE ANNUAL WELLNESS EXAM: Primary | ICD-10-CM

## 2024-05-13 DIAGNOSIS — F51.01 PRIMARY INSOMNIA: ICD-10-CM

## 2024-05-13 DIAGNOSIS — H40.9 GLAUCOMA OF BOTH EYES, UNSPECIFIED GLAUCOMA TYPE: ICD-10-CM

## 2024-05-13 DIAGNOSIS — I73.00 RAYNAUD'S SYNDROME WITHOUT GANGRENE: ICD-10-CM

## 2024-05-13 DIAGNOSIS — Z12.31 ENCOUNTER FOR SCREENING MAMMOGRAM FOR BREAST CANCER: ICD-10-CM

## 2024-05-13 DIAGNOSIS — N95.2 POSTMENOPAUSAL ATROPHIC VAGINITIS: ICD-10-CM

## 2024-05-13 DIAGNOSIS — R06.09 DOE (DYSPNEA ON EXERTION): ICD-10-CM

## 2024-05-13 PROBLEM — N95.1 SYMPTOMATIC MENOPAUSAL OR FEMALE CLIMACTERIC STATES: Status: RESOLVED | Noted: 2023-02-07 | Resolved: 2024-05-13

## 2024-05-13 PROBLEM — H61.22 IMPACTED CERUMEN OF LEFT EAR: Status: RESOLVED | Noted: 2023-08-08 | Resolved: 2024-05-13

## 2024-05-13 PROBLEM — B37.0 ORAL THRUSH: Status: RESOLVED | Noted: 2023-03-29 | Resolved: 2024-05-13

## 2024-05-13 PROCEDURE — 99214 OFFICE O/P EST MOD 30 MIN: CPT | Mod: 25 | Performed by: INTERNAL MEDICINE

## 2024-05-13 PROCEDURE — G0439 PPPS, SUBSEQ VISIT: HCPCS | Performed by: INTERNAL MEDICINE

## 2024-05-13 RX ORDER — BUDESONIDE 3 MG/1
1.5 CAPSULE, COATED PELLETS ORAL EVERY MORNING
COMMUNITY

## 2024-05-13 NOTE — ASSESSMENT & PLAN NOTE
Neuropsych evaluation did not show any significant cognitive impairment, she was very relieved with these results. Thought lapses are likely in s/o insomnia/sleep issues and anxiety.   - She will continue to monitor  - Sleep medicine f/up scheduled for November   - If ongoing issues, can repeat cognitive testing [id number]

## 2024-05-13 NOTE — ASSESSMENT & PLAN NOTE
She has been having some dyspnea on exertion, chest with hills.  Is able to continue to walk daily.  Did have a negative stress test 9/2021.  -Given her otherwise excellent health, not significantly worried about cardiac nature  -She will watch closely and let me know if this worsens or is associated with any palpitations, dizziness, lightheadedness  -If any of the above occur, would repeat stress testing

## 2024-05-13 NOTE — ASSESSMENT & PLAN NOTE
We discussed healthy lifestyle, nutrition, cardiovascular risk reduction, self care, safety, sunscreen, and timing of cancer screening.  Health maintenance screening and immunizations reviewed with the patient.    - Labs updated and normal 1/2024  - Mammogram due and ordered  - Colonoscopy due 7/2025  - UTD on vaccines, will get Tdap 2026  - Follow up yearly for the annual physical.

## 2024-05-13 NOTE — ASSESSMENT & PLAN NOTE
Sleep is better recently, being more mindful of her anxiety seems to be helping.  Not taking any sleep aids anymore.  - Continue to work on sleep hygiene   - Does have sleep study scheduled 11/2024

## 2024-05-13 NOTE — PATIENT INSTRUCTIONS
"Preventive Care Advice   This is general advice we often give to help people stay healthy. Your care team may have specific advice just for you. Please talk to your care team about your own preventive care needs.  Lifestyle  Exercise at least 150 minutes each week (30 minutes a day, 5 days a week).  Do muscle strengthening activities 2 days a week. These help control your weight and prevent disease.  No smoking.  Wear sunscreen to prevent skin cancer.  Have your home tested for radon every 2 to 5 years. Radon is a colorless, odorless gas that can harm your lungs. To learn more, go to www.health.[facility].mn. and search for \"Radon in Homes.\"  Keep guns unloaded and locked up in a safe place like a safe or gun vault, or, use a gun lock and hide the keys. Always lock away bullets separately. To learn more, visit [place].gov and search for \"safe gun storage.\"  Nutrition  Eat 5 or more servings of fruits and vegetables each day.  Try wheat bread, brown rice and whole grain pasta (instead of white bread, rice, and pasta).  Get enough calcium and vitamin D. Check the label on foods and aim for 100% of the RDA (recommended daily allowance).  Regular exams  Have a dental exam and cleaning every 6 months.  See your health care team every year to talk about:  Any changes in your health.  Any medicines your care team has prescribed.  Preventive care, family planning, and ways to prevent chronic diseases.  Shots (vaccines)   HPV shots (up to age 26), if you've never had them before.  Hepatitis B shots (up to age 59), if you've never had them before.  COVID-19 shot: Get this shot when it's due.  Flu shot: Get a flu shot every year.  Tetanus shot: Get a tetanus shot every 10 years.  Pneumococcal, hepatitis A, and RSV shots: Ask your care team if you need these based on your risk.  Shingles shot (for age 50 and up).  General health tests  Diabetes screening:  Starting at age 35, Get screened for diabetes at least every 3 years.  If " you are younger than age 35, ask your care team if you should be screened for diabetes.  Cholesterol test: At age 39, start having a cholesterol test every 5 years, or more often if advised.  Bone density scan (DEXA): At age 50, ask your care team if you should have this scan for osteoporosis (brittle bones).  Hepatitis C: Get tested at least once in your life.  Abdominal aortic aneurysm screening: Talk to your doctor about having this screening if you:  Have ever smoked; and  Are biologically male; and  Are between the ages of 65 and 75.  STIs (sexually transmitted infections)  Before age 24: Ask your care team if you should be screened for STIs.  After age 24: Get screened for STIs if you're at risk. You are at risk for STIs (including HIV) if:  You are sexually active with more than one person.  You don't use condoms every time.  You or a partner was diagnosed with a sexually transmitted infection.  If you are at risk for HIV, ask about PrEP medicine to prevent HIV.  Get tested for HIV at least once in your life, whether you are at risk for HIV or not.  Cancer screening tests  Cervical cancer screening: If you have a cervix, begin getting regular cervical cancer screening tests at age 21. Most people who have regular screenings with normal results can stop after age 65. Talk about this with your provider.  Breast cancer scan (mammogram): If you've ever had breasts, begin having regular mammograms starting at age 40. This is a scan to check for breast cancer.  Colon cancer screening: It is important to start screening for colon cancer at age 45.  Have a colonoscopy test every 10 years (or more often if you're at risk) Or, ask your provider about stool tests like a FIT test every year or Cologuard test every 3 years.  To learn more about your testing options, visit: [email or web address].pdf.  For help making a decision, visit: [email or web address].  Prostate cancer screening test: If you have a prostate and are age 55  to 69, ask your provider if you would benefit from a yearly prostate cancer screening test.  Lung cancer screening: If you are a current or former smoker age 50 to 80, ask your care team if ongoing lung cancer screenings are right for you.  For informational purposes only. Not to replace the advice of your health care provider. Copyright   2023 [employer]. All rights reserved. Clinically reviewed by the [facility]. Versus 381095 - REV 04/24.    Hearing Loss: Care Instructions  Overview     Hearing loss is a sudden or slow decrease in how well you hear. It can range from slight to profound. Permanent hearing loss can occur with aging. It also can happen when you are exposed long-term to loud noise. Examples include listening to loud music, riding motorcycles, or being around other loud machines.  Hearing loss can affect your work and home life. It can make you feel lonely or depressed. You may feel that you have lost your independence. But hearing aids and other devices can help you hear better and feel connected to others.  Follow-up care is a key part of your treatment and safety. Be sure to make and go to all appointments, and call your doctor if you are having problems. It's also a good idea to know your test results and keep a list of the medicines you take.  How can you care for yourself at home?  Avoid loud noises whenever possible. This helps keep your hearing from getting worse.  Always wear hearing protection around loud noises.  Wear a hearing aid as directed.  A professional can help you pick a hearing aid that will work best for you.  You can also get hearing aids over the counter for mild to moderate hearing loss.  Have hearing tests as your doctor suggests. They can show whether your hearing has changed. Your hearing aid may need to be adjusted.  Use other devices as needed. These may include:  Telephone amplifiers and hearing aids that can connect to a  "television, stereo, radio, or microphone.  Devices that use lights or vibrations. These alert you to the doorbell, a ringing telephone, or a baby monitor.  Television closed-captioning. This shows the words at the bottom of the screen. Most new TVs can do this.  TTY (text telephone). This lets you type messages back and forth on the telephone instead of talking or listening. These devices are also called TDD. When messages are typed on the keyboard, they are sent over the phone line to a receiving TTY. The message is shown on a monitor.  Use text messaging, social media, and email if it is hard for you to communicate by telephone.  Try to learn a listening technique called speechreading. It is not lipreading. You pay attention to people's gestures, expressions, posture, and tone of voice. These clues can help you understand what a person is saying. Face the person you are talking to, and have them face you. Make sure the lighting is good. You need to see the other person's face clearly.  Think about counseling if you need help to adjust to your hearing loss.  When should you call for help?  Watch closely for changes in your health, and be sure to contact your doctor if:    You think your hearing is getting worse.     You have new symptoms, such as dizziness or nausea.   Where can you learn more?  Go to https://www.[employer].net/patiented  Enter R798 in the search box to learn more about \"Hearing Loss: Care Instructions.\"  Current as of: September 27, 2023               Content Version: 14.0    8817-8259 [employer].   Care instructions adapted under license by your healthcare professional. If you have questions about a medical condition or this instruction, always ask your healthcare professional. Healthwise, [employer] disclaims any warranty or liability for your use of this information.      Learning About Stress  What is stress?     Stress is your body's response to a hard situation. Your body can " have a physical, emotional, or mental response. Stress is a fact of life for most people, and it affects everyone differently. What causes stress for you may not be stressful for someone else.  A lot of things can cause stress. You may feel stress when you go on a job interview, take a test, or run a race. This kind of short-term stress is normal and even useful. It can help you if you need to work hard or react quickly. For example, stress can help you finish an important job on time.  Long-term stress is caused by ongoing stressful situations or events. Examples of long-term stress include long-term health problems, ongoing problems at work, or conflicts in your family. Long-term stress can harm your health.  How does stress affect your health?  When you are stressed, your body responds as though you are in danger. It makes hormones that speed up your heart, make you breathe faster, and give you a burst of energy. This is called the fight-or-flight stress response. If the stress is over quickly, your body goes back to normal and no harm is done.  But if stress happens too often or lasts too long, it can have bad effects. Long-term stress can make you more likely to get sick, and it can make symptoms of some diseases worse. If you tense up when you are stressed, you may develop neck, shoulder, or low back pain. Stress is linked to high blood pressure and heart disease.  Stress also harms your emotional health. It can make you arrington, tense, or depressed. Your relationships may suffer, and you may not do well at work or school.  What can you do to manage stress?  You can try these things to help manage stress:   Do something active. Exercise or activity can help reduce stress. Walking is a great way to get started. Even everyday activities such as housecleaning or yard work can help.  Try yoga or jack chi. These techniques combine exercise and meditation. You may need some training at first to learn them.  Do  "something you enjoy. For example, listen to music or go to a movie. Practice your hobby or do volunteer work.  Meditate. This can help you relax, because you are not worrying about what happened before or what may happen in the future.  Do guided imagery. Imagine yourself in any setting that helps you feel calm. You can use online videos, books, or a teacher to guide you.  Do breathing exercises. For example:  From a standing position, bend forward from the waist with your knees slightly bent. Let your arms dangle close to the floor.  Breathe in slowly and deeply as you return to a standing position. Roll up slowly and lift your head last.  Hold your breath for just a few seconds in the standing position.  Breathe out slowly and bend forward from the waist.  Let your feelings out. Talk, laugh, cry, and express anger when you need to. Talking with supportive friends or family, a counselor, or a mack leader about your feelings is a healthy way to relieve stress. Avoid discussing your feelings with people who make you feel worse.  Write. It may help to write about things that are bothering you. This helps you find out how much stress you feel and what is causing it. When you know this, you can find better ways to cope.  What can you do to prevent stress?  You might try some of these things to help prevent stress:  Manage your time. This helps you find time to do the things you want and need to do.  Get enough sleep. Your body recovers from the stresses of the day while you are sleeping.  Get support. Your family, friends, and community can make a difference in how you experience stress.  Limit your news feed. Avoid or limit time on social media or news that may make you feel stressed.  Do something active. Exercise or activity can help reduce stress. Walking is a great way to get started.  Where can you learn more?  Go to https://www.healthwise.net/patiented  Enter N032 in the search box to learn more about \"Learning " "About Stress.\"  Current as of: October 24, 2023               Content Version: 14.0    1092-3095 [employer].   Care instructions adapted under license by your healthcare professional. If you have questions about a medical condition or this instruction, always ask your healthcare professional. [employer] disclaims any warranty or liability for your use of this information.      Learning About Sleeping Well  What does sleeping well mean?     Sleeping well means getting enough sleep to feel good and stay healthy. How much sleep is enough varies among people.  The number of hours you sleep and how you feel when you wake up are both important. If you do not feel refreshed, you probably need more sleep. Another sign of not getting enough sleep is feeling tired during the day.  Experts recommend that adults get at least 7 or more hours of sleep per day. Children and older adults need more sleep.  Why is getting enough sleep important?  Getting enough quality sleep is a basic part of good health. When your sleep suffers, your physical health, mood, and your thoughts can suffer too. You may find yourself feeling more grumpy or stressed. Not getting enough sleep also can lead to serious problems, including injury, accidents, anxiety, and depression.  What might cause poor sleeping?  Many things can cause sleep problems, including:  Changes to your sleep schedule.  Stress. Stress can be caused by fear about a single event, such as giving a speech. Or you may have ongoing stress, such as worry about work or school.  Depression, anxiety, and other mental or emotional conditions.  Changes in your sleep habits or surroundings. This includes changes that happen where you sleep, such as noise, light, or sleeping in a different bed. It also includes changes in your sleep pattern, such as having jet lag or working a late shift.  Health problems, such as pain, breathing problems, and restless legs " "syndrome.  Lack of regular exercise.  Using alcohol, nicotine, or caffeine before bed.  How can you help yourself?  Here are some tips that may help you sleep more soundly and wake up feeling more refreshed.  Your sleeping area   Use your bedroom only for sleeping and sex. A bit of light reading may help you fall asleep. But if it doesn't, do your reading elsewhere in the house. Try not to use your TV, computer, smartphone, or tablet while you are in bed.  Be sure your bed is big enough to stretch out comfortably, especially if you have a sleep partner.  Keep your bedroom quiet, dark, and cool. Use curtains, blinds, or a sleep mask to block out light. To block out noise, use earplugs, soothing music, or a \"white noise\" machine.  Your evening and bedtime routine   Create a relaxing bedtime routine. You might want to take a warm shower or bath, or listen to soothing music.  Go to bed at the same time every night. And get up at the same time every morning, even if you feel tired.  What to avoid   Limit caffeine (coffee, tea, caffeinated sodas) during the day, and don't have any for at least 6 hours before bedtime.  Avoid drinking alcohol before bedtime. Alcohol can cause you to wake up more often during the night.  Try not to smoke or use tobacco, especially in the evening. Nicotine can keep you awake.  Limit naps during the day, especially close to bedtime.  Avoid lying in bed awake for too long. If you can't fall asleep or if you wake up in the middle of the night and can't get back to sleep within about 20 minutes, get out of bed and go to another room until you feel sleepy.  Avoid taking medicine right before bed that may keep you awake or make you feel hyper or energized. Your doctor can tell you if your medicine may do this and if you can take it earlier in the day.  If you can't sleep   Imagine yourself in a peaceful, pleasant scene. Focus on the details and feelings of being in a place that is relaxing.  Get up " "and do a quiet or boring activity until you feel sleepy.  Avoid drinking any liquids before going to bed to help prevent waking up often to use the bathroom.  Where can you learn more?  Go to https://www.[employer].net/patiented  Enter J942 in the search box to learn more about \"Learning About Sleeping Well.\"  Current as of: July 10, 2023               Content Version: 14.0    0966-0565 [email or web address].   Care instructions adapted under license by your healthcare professional. If you have questions about a medical condition or this instruction, always ask your healthcare professional. Healthwise, [employer] disclaims any warranty or liability for your use of this information.      "

## 2024-05-13 NOTE — PROGRESS NOTES
Preventive Care Visit  [facility] Sultana Watson MD, Internal Medicine  May 13, 2024      Assessment & Plan   Problem List Items Addressed This Visit          Nervous and Auditory    Abnormal taste in mouth     Actually much better recently.  -Continue omeprazole 20 mg once daily and follow-up with GI            Respiratory    WEBBER (dyspnea on exertion)     She has been having some dyspnea on exertion, chest with hills.  Is able to continue to walk daily.  Did have a negative stress test 9/2021.  -Given her otherwise excellent health, not significantly worried about cardiac nature  -She will watch closely and let me know if this worsens or is associated with any palpitations, dizziness, lightheadedness  -If any of the above occur, would repeat stress testing            Digestive    Eosinophilic esophagitis     Chronic and stable.  Follows with GI.  - Continue budesonide daily          Relevant Medications    omeprazole (PRILOSEC) 20 MG DR capsule       Circulatory    Raynaud's syndrome without gangrene     Well controlled with amlodipine 10 mg PRN.             Urinary    Postmenopausal atrophic vaginitis     Estrace cream prescribed GYN.  This has been a little bit helpful.  - Continue estrace cream 2-3 times weekly             Other    Glaucoma     Follows with ophthalmology.  Does have tafluprost and timolol eyedrops.         Insomnia     Sleep is better recently, being more mindful of her anxiety seems to be helping.  Not taking any sleep aids anymore.  - Continue to work on sleep hygiene   - Does have sleep study scheduled 11/2024         Encounter for Medicare annual wellness exam - Primary     We discussed healthy lifestyle, nutrition, cardiovascular risk reduction, self care, safety, sunscreen, and timing of cancer screening.  Health maintenance screening and immunizations reviewed with the patient.    - Labs updated and normal 1/2024  - Mammogram due and ordered  -  Colonoscopy due 7/2025  - UTD on vaccines, will get Tdap 2026  - Follow up yearly for the annual physical.         Memory changes     Neuropsych evaluation did not show any significant cognitive impairment, she was very relieved with these results. Thought lapses are likely in s/o insomnia/sleep issues and anxiety.   - She will continue to monitor  - Sleep medicine f/up scheduled for November   - If ongoing issues, can repeat cognitive testing [id number]          Other Visit Diagnoses       Encounter for screening mammogram for breast cancer        Relevant Orders    MA Screen Bilateral w/Kaleb             Patient has been advised of split billing requirements and indicates understanding: Yes      Counseling  Appropriate preventive services were discussed with this patient, including applicable screening as appropriate for fall prevention, nutrition, physical activity, Tobacco-use cessation, weight loss and cognition.  Checklist reviewing preventive services available has been given to the patient.  Reviewed patient's diet, addressing concerns and/or questions.   Discussed possible causes of fatigue. The patient was provided with written information regarding signs of hearing loss.     FUTURE APPOINTMENTS:       - Follow-up for annual visit or as needed      Leonel Alaniz is a 69 year old, presenting for the following:  Annual Visit        5/13/2024     8:16 AM   Additional Questions   Roomed by [facility] Care Directive  Patient does not have a Health Care Directive or Living Will: Discussed advance care planning with patient; information given to patient to review.    PANCHO    Katty is here for AWV today. Doing well, has a few questions related to her chronic conditions below that we reviewed.     Sour taste: Omeprazole 20 mg increased to BID with GI 1/2024. Doesn't think going to twice daily made much of a difference, so went back to once daily. However, not nearly as bad as it was. Chocolate  tasting better.     EOE: Budesonide 1.5 mg daily. Follows with [facility].     Insomnia: Tells me this is going better today. Routine, awareness seem to be helping. Only waking up once at night and is able to fall back asleep. Sleep study is scheduled for 11/5/24.     Memory: Still an issue. Will forget things after asking.  is very helpful. Last neuropsych testing 9/2023, recommended sleep study, working on anxiety and repeat testing in 1-2 years if issues progress.     HLD: Lipids 1/2024 Tchol 228, TG 75, DL 96,   The 10-year ASCVD risk score (Liberty COBB, et al., 2019) is: 7.4%    Values used to calculate the score:      Age: 69 years      Sex: Female      Is Non- : No      Diabetic: No      Tobacco smoker: No      Systolic Blood Pressure: 124 mmHg      Is BP treated: No      HDL Cholesterol: 96 mg/dL      Total Cholesterol: 228 mg/dL    Raynauds: Amlodipine 10 mg daily PRN. Started back again recently and is helping.     GUSM: Estrace cream. Mildly helpful. Follows with GYN.     Hair thinning: ferritin and thyroid normal 1/2024. Slightly better recently.     Osteopenia: Last DEXA 3/2023. Is taking vitamin D and calcium. Walking most days.     HCM: Pap NILM with neg HIV 12/2023. Last mammo BIRADS1 3/2023. Colonoscopy normal 7/2015, repeat due 10 years.         5/10/2024   General Health   How would you rate your overall physical health? Good   Feel stress (tense, anxious, or unable to sleep) To some extent         5/10/2024   Nutrition   Diet: Regular (no restrictions)         5/10/2024   Exercise   Days per week of moderate/strenous exercise 5 days   Average minutes spent exercising at this level 30 min         5/10/2024   Social Factors   Frequency of gathering with friends or relatives Once a week   Worry food won't last until get money to buy more No   Food not last or not have enough money for food? No   Do you have housing?  Yes   Are you worried about losing your housing? No    Lack of transportation? No   Unable to get utilities (heat,electricity)? No         5/10/2024   Fall Risk   Fallen 2 or more times in the past year? No   Trouble with walking or balance? No          5/10/2024   Activities of Daily Living- Home Safety   Needs help with the following daily activites None of the above   Safety concerns in the home None of the above         5/10/2024   Dental   Dentist two times every year? Yes         5/10/2024   Hearing Screening   Hearing concerns? (!) IT'S HARD TO FOLLOW A CONVERSATION IN A NOISY RESTAURANT OR CROWDED ROOM.         5/10/2024   Driving Risk Screening   Patient/family members have concerns about driving No         5/10/2024   General Alertness/Fatigue Screening   Have you been more tired than usual lately? (!) YES         5/10/2024   Urinary Incontinence Screening   Bothered by leaking urine in past 6 months No         5/10/2024   TB Screening   Were you born outside of the [place]? No         Today's PHQ-2 Score:       5/13/2024     8:09 AM   PHQ-2 ( 1999 [employer])   Q1: Little interest or pleasure in doing things 0   Q2: Feeling down, depressed or hopeless 0   PHQ-2 Score 0   Q1: Little interest or pleasure in doing things Not at all   Q2: Feeling down, depressed or hopeless Not at all   PHQ-2 Score 0           5/10/2024   Substance Use   Alcohol more than 3/day or more than 7/wk No   Do you have a current opioid prescription? No   How severe/bad is pain from 1 to 10? 0/10 (No Pain)   Do you use any other substances recreationally? No     Social History     Tobacco Use    Smoking status: Never    Smokeless tobacco: Never   Substance Use Topics    Alcohol use: Yes     Comment: Alcoholic Drinks/day: occ    Drug use: No         3/28/2023   LAST FHS-7 RESULTS   1st degree relative breast or ovarian cancer No   Any relative bilateral breast cancer No   Any male have breast cancer No   Any ONE woman have BOTH breast AND ovarian cancer No   Any woman with breast cancer before  50yrs No   2 or more relatives with breast AND/OR ovarian cancer No   2 or more relatives with breast AND/OR bowel cancer No     Mammogram Screening - Mammogram every 1-2 years updated in Health Maintenance based on mutual decision making    ASCVD Risk   The 10-year ASCVD risk score (Liberty COBB, et al., 2019) is: 7.4%    Values used to calculate the score:      Age: 69 years      Sex: Female      Is Non- : No      Diabetic: No      Tobacco smoker: No      Systolic Blood Pressure: 124 mmHg      Is BP treated: No      HDL Cholesterol: 96 mg/dL      Total Cholesterol: 228 mg/dL    Reviewed and updated as needed this visit by Provider   Tobacco  Allergies  Meds  Problems  Med Hx  Surg Hx  Fam Hx     Sexual Activity          Past Medical History:   Diagnosis Date    Bradycardia 08/14/2015    Asymptomatic Possibly related to vagal tone    Esophageal rupture     Glaucoma (increased eye pressure)     Hypertension     new no meds    Nuclear sclerosis of both eyes     Oral thrush 03/29/2023    Pre-syncope 08/14/2015    Occasional episodes in her teens and as an adult     Past Surgical History:   Procedure Laterality Date    GLAUCOMA SURGERY      ZZC LIGATE FALLOPIAN TUBE      Description: Tubal Ligation;  Recorded: 09/18/2009;     Current providers sharing in care for this patient include:  Patient Care Team:  Mansaa Watson MD as PCP - General  Isacc Gill MD as MD [place], Kitty Cobb MD as MD (Ophthalmology)  Manasa Watson MD as Assigned PCP  Tita Mendez, PhD LP as Assigned Behavioral Health Provider  Sterling Baptiste DO as Assigned Sleep Provider    The following health maintenance items are reviewed in Epic and correct as of today:  Health Maintenance   Topic Date Due    ANNUAL REVIEW OF HM ORDERS  Never done    DTAP/TDAP/TD IMMUNIZATION (1 - Tdap) 11/01/2016    MAMMO SCREENING  03/28/2025    MEDICARE ANNUAL WELLNESS VISIT  05/13/2025     "FALL RISK ASSESSMENT  05/13/2025    COLORECTAL CANCER SCREENING  07/14/2025    GLUCOSE  01/30/2027    LIPID  01/30/2029    ADVANCE CARE PLANNING  05/13/2029    DEXA  03/28/2038    HEPATITIS C SCREENING  Completed    PHQ-2 (once per calendar year)  Completed    INFLUENZA VACCINE  Completed    Pneumococcal Vaccine: 65+ Years  Completed    ZOSTER IMMUNIZATION  Completed    RSV VACCINE (Pregnancy & 60+)  Completed    COVID-19 Vaccine  Completed    IPV IMMUNIZATION  Aged Out    HPV IMMUNIZATION  Aged Out    MENINGITIS IMMUNIZATION  Aged Out    RSV MONOCLONAL ANTIBODY  Aged Out     Review of Systems  Constitutional, neuro, ENT, endocrine, pulmonary, cardiac, gastrointestinal, genitourinary, musculoskeletal, integument and psychiatric systems are negative, except as otherwise noted.     Objective    Exam  /62   Pulse 78   Temp 98.1  F (36.7  C) (Oral)   Resp 16   Ht 1.689 m (5' 6.5\")   Wt 50.4 kg (111 lb 1.6 oz)   LMP  (LMP Unknown)   SpO2 99%   BMI 17.66 kg/m     Estimated body mass index is 17.66 kg/m  as calculated from the following:    Height as of this encounter: 1.689 m (5' 6.5\").    Weight as of this encounter: 50.4 kg (111 lb 1.6 oz).    Physical Exam  GENERAL: thin woman, alert and no distress  EYES: Eyes grossly normal to inspection, PERRL and conjunctivae and sclerae normal  HENT: ear canals and TM's normal, nose and mouth without ulcers or lesions  NECK: no adenopathy, no asymmetry, masses, or scars  RESP: lungs clear to auscultation - no rales, rhonchi or wheezes  CV: regular rate and rhythm, normal S1 S2, no S3 or S4, no murmur, click or rub, no peripheral edema  ABDOMEN: soft, nontender, no hepatosplenomegaly, no masses and bowel sounds normal  MS: no gross musculoskeletal defects noted, no edema  SKIN: no suspicious lesions or rashes  NEURO: Normal strength and tone, mentation intact and speech normal  PSYCH: mentation appears normal, affect normal/bright        5/13/2024   Mini Cog   Clock " Draw Score 2 Normal   3 Item Recall 3 objects recalled   Mini Cog Total Score 5            Signed Electronically by: Manasa Watson MD

## 2024-05-13 NOTE — ASSESSMENT & PLAN NOTE
Estrace cream prescribed GYN.  This has been a little bit helpful.  - Continue estrace cream 2-3 times weekly

## 2024-07-05 ENCOUNTER — NURSE TRIAGE (OUTPATIENT)
Dept: INTERNAL MEDICINE | Facility: CLINIC | Age: 69
End: 2024-07-05
Payer: MEDICARE

## 2024-07-05 NOTE — TELEPHONE ENCOUNTER
"Mass found a few weeks ago. Mass located on the left side of neck below jaw line.  Mass is hard to touch. Denies redness, swelling or fevers.  Having some pain when touching. No skin changes.    No appointments with PCP, patient would like to see PCP for this.     Advised to be seen in [facility] if any fever, redness, swelling or increase in pain   Reason for Disposition   Small swelling or lump present > 1 week    Answer Assessment - Initial Assessment Questions  1. APPEARANCE of SWELLING: \"What does it look like?\"      lump  2. SIZE: \"How large is the swelling?\" (e.g., inches, cm; or compare to size of pinhead, tip of pen, eraser, coin, pea, grape, ping pong ball)       quarter  3. LOCATION: \"Where is the swelling located?\"      denies  4. ONSET: \"When did the swelling start?\"      One month ago  5. COLOR: \"What color is it?\" \"Is there more than one color?\"      Skin color   6. PAIN: \"Is there any pain?\" If Yes, ask: \"How bad is the pain?\" (e.g., scale 1-10; or mild, moderate, severe)      - NONE (0): no pain    - MILD (1-3): doesn't interfere with normal activities     - MODERATE (4-7): interferes with normal activities or awakens from sleep     - SEVERE (8-10): excruciating pain, unable to do any normal activities      1  7. ITCH: \"Does it itch?\" If Yes, ask: \"How bad is the itch?\"       no  8. CAUSE: \"What do you think caused the swelling?\"  9 OTHER SYMPTOMS: \"Do you have any other symptoms?\" (e.g., fever)      Denies    Protocols used: Skin Lump or Localized Swelling-A-OH    "

## 2024-07-05 NOTE — TELEPHONE ENCOUNTER
Reason for Call:  Appointment Request    Patient requesting this type of appt:  other    Requested provider: Manasa Watson    Reason patient unable to be scheduled: Not within requested timeframe    When does patient want to be seen/preferred time: 1-2 days    Comments: Pt need an appt to check neck mass for 1 month; not painful but larger     Could we send this information to you in [facility] or would you prefer to receive a phone call?:   Patient would prefer a phone call   Okay to leave a detailed message?: Yes at Cell number on file:    Telephone Information:   Mobile [phone]       Call taken on 7/5/2024 at 9:16 AM by Ankita Delong

## 2024-07-05 NOTE — TELEPHONE ENCOUNTER
Okay to use reserved slot for this.    Noah Ceballos MD  General Internal Medicine  [facility]  7/5/2024, 10:38 AM

## 2024-07-10 ENCOUNTER — OFFICE VISIT (OUTPATIENT)
Dept: INTERNAL MEDICINE | Facility: CLINIC | Age: 69
End: 2024-07-10
Payer: MEDICARE

## 2024-07-10 VITALS
SYSTOLIC BLOOD PRESSURE: 160 MMHG | HEART RATE: 64 BPM | DIASTOLIC BLOOD PRESSURE: 84 MMHG | TEMPERATURE: 97.6 F | RESPIRATION RATE: 16 BRPM | WEIGHT: 109.2 LBS | BODY MASS INDEX: 17.14 KG/M2 | OXYGEN SATURATION: 100 % | HEIGHT: 67 IN

## 2024-07-10 DIAGNOSIS — N95.2 POSTMENOPAUSAL ATROPHIC VAGINITIS: ICD-10-CM

## 2024-07-10 DIAGNOSIS — R22.1 LOCALIZED SWELLING, MASS AND LUMP, NECK: Primary | ICD-10-CM

## 2024-07-10 DIAGNOSIS — R03.0 ELEVATED BLOOD PRESSURE READING WITHOUT DIAGNOSIS OF HYPERTENSION: ICD-10-CM

## 2024-07-10 DIAGNOSIS — H57.89 REDNESS OF LEFT EYE: ICD-10-CM

## 2024-07-10 DIAGNOSIS — K20.0 EOSINOPHILIC ESOPHAGITIS: ICD-10-CM

## 2024-07-10 DIAGNOSIS — F51.01 PRIMARY INSOMNIA: ICD-10-CM

## 2024-07-10 DIAGNOSIS — R43.2 ABNORMAL TASTE IN MOUTH: ICD-10-CM

## 2024-07-10 DIAGNOSIS — I73.00 RAYNAUD'S SYNDROME WITHOUT GANGRENE: ICD-10-CM

## 2024-07-10 PROCEDURE — 99213 OFFICE O/P EST LOW 20 MIN: CPT | Performed by: INTERNAL MEDICINE

## 2024-07-10 PROCEDURE — G2211 COMPLEX E/M VISIT ADD ON: HCPCS | Performed by: INTERNAL MEDICINE

## 2024-07-10 ASSESSMENT — PAIN SCALES - GENERAL: PAINLEVEL: NO PAIN (1)

## 2024-07-10 NOTE — ASSESSMENT & PLAN NOTE
Redness of left medial eye seems like a burst blood vessel.  No conjunctival injection to raise concern for any conjunctivitis.  Expect this will improve over the next week or so.

## 2024-07-10 NOTE — ASSESSMENT & PLAN NOTE
Well controlled with amlodipine 10 mg PRN.   - Pending home blood pressures, may need to start daily dosing

## 2024-07-10 NOTE — ASSESSMENT & PLAN NOTE
Blood pressure fairly elevated today.  Does not have history of hypertension.  Does take amlodipine in the winter months for Raynaud's, not taking right now.  -Will have her check blood pressure at home over the next month, send me an update via [employer]  -If persistently elevated above 140/90, will have her start amlodipine 5 mg daily

## 2024-07-10 NOTE — PATIENT INSTRUCTIONS
Over the next month, check blood pressure 2-3 times a week. Keep a log, send me an update via [employer] after that. Goal <130/80. If most/all numbers are above 140/90, let me know sooner and we will start a little bit of amlodipine.

## 2024-07-10 NOTE — PROGRESS NOTES
Assessment & Plan   Problem List Items Addressed This Visit          Nervous and Auditory    Abnormal taste in mouth     Actually much better recently.  -Continue omeprazole 20 mg once daily and follow-up with GI            Digestive    Eosinophilic esophagitis     Chronic and stable.  Follows with GI.  - Continue budesonide daily             Circulatory    Raynaud's syndrome without gangrene     Well controlled with amlodipine 10 mg PRN.   - Pending home blood pressures, may need to start daily dosing            Urinary    Postmenopausal atrophic vaginitis     Estrace cream prescribed GYN.  This has been a little bit helpful.  - Continue estrace cream 2-3 times weekly             Other    Insomnia     Sleep continues to do well.  Not taking any sleep aids anymore.  - Continue to work on sleep hygiene   - Does have sleep study scheduled 11/2024         Localized swelling, mass and lump, neck - Primary     Patient comes in for evaluation of left neck mass that she found in the last month or so.  Feels it has fluctuated in size since then.  Does have a palpable mass on exam today over the sternocleidomastoid muscle.  - Will start with US of neck for further evaluation         Relevant Orders    US Head Neck Soft Tissue    Redness of left eye     Redness of left medial eye seems like a burst blood vessel.  No conjunctival injection to raise concern for any conjunctivitis.  Expect this will improve over the next week or so.         Elevated blood pressure reading without diagnosis of hypertension     Blood pressure fairly elevated today.  Does not have history of hypertension.  Does take amlodipine in the winter months for Raynaud's, not taking right now.  -Will have her check blood pressure at home over the next month, send me an update via [employer]  -If persistently elevated above 140/90, will have her start amlodipine 5 mg daily             The longitudinal plan of care for the diagnosis(es)/condition(s) as  "documented above were addressed during this visit. Due to the added complexity in care, I will continue to support Katty in the subsequent management and with ongoing continuity of care.     FUTURE APPOINTMENTS:       - Follow-up for annual visit or as needed      Subjective   Katty is a 69 year old, presenting for the following health issues:  Mass (Pt states on left side of neck) and Eye Problem (Pt states eye irritation on the left side; she woke up with it yesterday. )        7/10/2024     8:43 AM   Additional Questions   Roomed by JUAN Rothman   Accompanied by Self     History of Present Illness     Reason for visit:  Left neck lump  Symptom onset:  3-4 weeks ago  Symptoms include:  Neck lump  Symptom intensity:  Moderate  Symptom progression:  Staying the same  Had these symptoms before:  No  What makes it worse:  No  What makes it better:  No    L neck mass: Noticed maybe a month or so ago. Unclear if growing or changing. Doesn't hurt or anything. Mobile if she palpates it. Maybe had a cold a few weeks prior.     L eye: Redness started yesterday morning, just inside/medial corner. No itching or pain. Maybe started after rubbing. No discharge that she has noticed. Has chronic dry eyes.     She eats 2-3 servings of fruits and vegetables daily.She consumes 0 sweetened beverage(s) daily.She exercises with enough effort to increase her heart rate 20 to 29 minutes per day.  She exercises with enough effort to increase her heart rate 5 days per week.   She is taking medications regularly.     Review of Systems  Constitutional, neuro, ENT, endocrine, pulmonary, cardiac, gastrointestinal, genitourinary, musculoskeletal, integument and psychiatric systems are negative, except as otherwise noted.      Objective    BP (!) 160/84 (BP Location: Right arm, Patient Position: Sitting, Cuff Size: Adult Regular)   Pulse 64   Temp 97.6  F (36.4  C) (Oral)   Resp 16   Ht 1.689 m (5' 6.5\")   Wt 49.5 kg (109 lb 3.2 oz) "   LMP  (LMP Unknown)   SpO2 100%   BMI 17.36 kg/m    Body mass index is 17.36 kg/m .  Physical Exam   GENERAL: alert and no distress  EYES: L eye with hemorrhage of medial aspect, no conjunctival injection, PERRL and conjunctivae and sclerae normal  HENT: nose and mouth without ulcers or lesions  NECK: +palpable mass ~1 cm x 5-7 mm along L sternocleidomastoid muscle, non-tender, mobile  RESP: lungs clear to auscultation - no rales, rhonchi or wheezes  CV: regular rate and rhythm, normal S1 S2, no S3 or S4, no murmur, click or rub  MS: no gross musculoskeletal defects noted, no edema  SKIN: no suspicious lesions or rashes on exposed skin   NEURO: No focal deficits, mentation intact and speech normal  PSYCH: mentation appears normal, affect normal/bright          Signed Electronically by: Manasa Watson MD

## 2024-07-10 NOTE — ASSESSMENT & PLAN NOTE
Patient comes in for evaluation of left neck mass that she found in the last month or so.  Feels it has fluctuated in size since then.  Does have a palpable mass on exam today over the sternocleidomastoid muscle.  - Will start with US of neck for further evaluation

## 2024-07-10 NOTE — ASSESSMENT & PLAN NOTE
Sleep continues to do well.  Not taking any sleep aids anymore.  - Continue to work on sleep hygiene   - Does have sleep study scheduled 11/2024

## 2024-07-12 ENCOUNTER — HOSPITAL ENCOUNTER (OUTPATIENT)
Dept: ULTRASOUND IMAGING | Facility: HOSPITAL | Age: 69
Discharge: HOME | End: 2024-07-12
Attending: INTERNAL MEDICINE | Admitting: INTERNAL MEDICINE
Payer: MEDICARE

## 2024-07-12 PROCEDURE — 76536 US EXAM OF HEAD AND NECK: CPT

## 2024-08-21 ENCOUNTER — TRANSFERRED RECORDS (OUTPATIENT)
Dept: HEALTH INFORMATION MANAGEMENT | Facility: CLINIC | Age: 69
End: 2024-08-21
Payer: MEDICARE

## 2024-10-14 ENCOUNTER — TRANSCRIBE ORDERS (OUTPATIENT)
Dept: OTHER | Age: 69
End: 2024-10-14

## 2024-10-14 DIAGNOSIS — R53.82 CHRONIC FATIGUE: ICD-10-CM

## 2024-10-14 DIAGNOSIS — G47.19 EXCESSIVE DAYTIME SLEEPINESS: Primary | ICD-10-CM

## 2024-10-14 DIAGNOSIS — G47.33 OSA (OBSTRUCTIVE SLEEP APNEA): ICD-10-CM

## 2024-10-18 ENCOUNTER — TRANSCRIBE ORDERS (OUTPATIENT)
Dept: OTHER | Age: 69
End: 2024-10-18

## 2024-10-18 DIAGNOSIS — R41.3 MEMORY LOSS: Primary | ICD-10-CM

## 2024-11-20 ENCOUNTER — TRANSFERRED RECORDS (OUTPATIENT)
Dept: HEALTH INFORMATION MANAGEMENT | Facility: CLINIC | Age: 69
End: 2024-11-20

## 2025-01-07 ASSESSMENT — PATIENT HEALTH QUESTIONNAIRE - PHQ9
SUM OF ALL RESPONSES TO PHQ QUESTIONS 1-9: 13
SUM OF ALL RESPONSES TO PHQ QUESTIONS 1-9: 13
10. IF YOU CHECKED OFF ANY PROBLEMS, HOW DIFFICULT HAVE THESE PROBLEMS MADE IT FOR YOU TO DO YOUR WORK, TAKE CARE OF THINGS AT HOME, OR GET ALONG WITH OTHER PEOPLE: SOMEWHAT DIFFICULT

## 2025-01-08 ENCOUNTER — OFFICE VISIT (OUTPATIENT)
Dept: INTERNAL MEDICINE | Facility: CLINIC | Age: 70
End: 2025-01-08
Payer: MEDICARE

## 2025-01-08 VITALS
TEMPERATURE: 97.8 F | HEIGHT: 66 IN | OXYGEN SATURATION: 99 % | RESPIRATION RATE: 16 BRPM | DIASTOLIC BLOOD PRESSURE: 58 MMHG | HEART RATE: 61 BPM | WEIGHT: 108.7 LBS | SYSTOLIC BLOOD PRESSURE: 124 MMHG | BODY MASS INDEX: 17.47 KG/M2

## 2025-01-08 DIAGNOSIS — K20.0 EOSINOPHILIC ESOPHAGITIS: ICD-10-CM

## 2025-01-08 DIAGNOSIS — M85.88 OSTEOPENIA OF LUMBAR SPINE: ICD-10-CM

## 2025-01-08 DIAGNOSIS — F51.01 PRIMARY INSOMNIA: ICD-10-CM

## 2025-01-08 DIAGNOSIS — Z86.39 HX OF IRON DEFICIENCY: ICD-10-CM

## 2025-01-08 DIAGNOSIS — I73.00 RAYNAUD'S SYNDROME WITHOUT GANGRENE: ICD-10-CM

## 2025-01-08 DIAGNOSIS — F32.1 CURRENT MODERATE EPISODE OF MAJOR DEPRESSIVE DISORDER WITHOUT PRIOR EPISODE (H): Primary | ICD-10-CM

## 2025-01-08 DIAGNOSIS — R53.82 CHRONIC FATIGUE: ICD-10-CM

## 2025-01-08 DIAGNOSIS — N95.2 POSTMENOPAUSAL ATROPHIC VAGINITIS: ICD-10-CM

## 2025-01-08 PROBLEM — G47.33 OBSTRUCTIVE SLEEP APNEA SYNDROME: Status: ACTIVE | Noted: 2024-11-04

## 2025-01-08 LAB
ALBUMIN SERPL BCG-MCNC: 4.3 G/DL (ref 3.5–5.2)
ALP SERPL-CCNC: 50 U/L (ref 40–150)
ALT SERPL W P-5'-P-CCNC: 25 U/L (ref 0–50)
ANION GAP SERPL CALCULATED.3IONS-SCNC: 9 MMOL/L (ref 7–15)
AST SERPL W P-5'-P-CCNC: 29 U/L (ref 0–45)
BASOPHILS # BLD AUTO: 0 10E3/UL (ref 0–0.2)
BASOPHILS NFR BLD AUTO: 1 %
BILIRUB SERPL-MCNC: 0.5 MG/DL
BUN SERPL-MCNC: 14.8 MG/DL (ref 8–23)
CALCIUM SERPL-MCNC: 9.3 MG/DL (ref 8.8–10.4)
CHLORIDE SERPL-SCNC: 101 MMOL/L (ref 98–107)
CREAT SERPL-MCNC: 0.66 MG/DL (ref 0.51–0.95)
EGFRCR SERPLBLD CKD-EPI 2021: >90 ML/MIN/1.73M2
EOSINOPHIL # BLD AUTO: 0 10E3/UL (ref 0–0.7)
EOSINOPHIL NFR BLD AUTO: 1 %
ERYTHROCYTE [DISTWIDTH] IN BLOOD BY AUTOMATED COUNT: 12.1 % (ref 10–15)
FERRITIN SERPL-MCNC: 37 NG/ML (ref 11–328)
GLUCOSE SERPL-MCNC: 72 MG/DL (ref 70–99)
HCO3 SERPL-SCNC: 28 MMOL/L (ref 22–29)
HCT VFR BLD AUTO: 36.7 % (ref 35–47)
HGB BLD-MCNC: 12.6 G/DL (ref 11.7–15.7)
IMM GRANULOCYTES # BLD: 0 10E3/UL
IMM GRANULOCYTES NFR BLD: 0 %
LYMPHOCYTES # BLD AUTO: 1.3 10E3/UL (ref 0.8–5.3)
LYMPHOCYTES NFR BLD AUTO: 31 %
MAGNESIUM SERPL-MCNC: 2.2 MG/DL (ref 1.7–2.3)
MCH RBC QN AUTO: 31.2 PG (ref 26.5–33)
MCHC RBC AUTO-ENTMCNC: 34.3 G/DL (ref 31.5–36.5)
MCV RBC AUTO: 91 FL (ref 78–100)
MONOCYTES # BLD AUTO: 0.3 10E3/UL (ref 0–1.3)
MONOCYTES NFR BLD AUTO: 8 %
NEUTROPHILS # BLD AUTO: 2.7 10E3/UL (ref 1.6–8.3)
NEUTROPHILS NFR BLD AUTO: 61 %
PLATELET # BLD AUTO: 247 10E3/UL (ref 150–450)
POTASSIUM SERPL-SCNC: 4 MMOL/L (ref 3.4–5.3)
PROT SERPL-MCNC: 6.9 G/DL (ref 6.4–8.3)
RBC # BLD AUTO: 4.04 10E6/UL (ref 3.8–5.2)
SODIUM SERPL-SCNC: 138 MMOL/L (ref 135–145)
TSH SERPL DL<=0.005 MIU/L-ACNC: 0.86 UIU/ML (ref 0.3–4.2)
VIT D+METAB SERPL-MCNC: 55 NG/ML (ref 20–50)
WBC # BLD AUTO: 4.4 10E3/UL (ref 4–11)

## 2025-01-08 PROCEDURE — 82728 ASSAY OF FERRITIN: CPT | Mod: GZ | Performed by: INTERNAL MEDICINE

## 2025-01-08 PROCEDURE — 80053 COMPREHEN METABOLIC PANEL: CPT | Performed by: INTERNAL MEDICINE

## 2025-01-08 PROCEDURE — 36415 COLL VENOUS BLD VENIPUNCTURE: CPT | Performed by: INTERNAL MEDICINE

## 2025-01-08 PROCEDURE — 99215 OFFICE O/P EST HI 40 MIN: CPT | Performed by: INTERNAL MEDICINE

## 2025-01-08 PROCEDURE — G2211 COMPLEX E/M VISIT ADD ON: HCPCS | Performed by: INTERNAL MEDICINE

## 2025-01-08 PROCEDURE — 84443 ASSAY THYROID STIM HORMONE: CPT | Performed by: INTERNAL MEDICINE

## 2025-01-08 PROCEDURE — 83735 ASSAY OF MAGNESIUM: CPT | Performed by: INTERNAL MEDICINE

## 2025-01-08 PROCEDURE — 96127 BRIEF EMOTIONAL/BEHAV ASSMT: CPT | Performed by: INTERNAL MEDICINE

## 2025-01-08 PROCEDURE — 82306 VITAMIN D 25 HYDROXY: CPT | Mod: GZ | Performed by: INTERNAL MEDICINE

## 2025-01-08 PROCEDURE — 85025 COMPLETE CBC W/AUTO DIFF WBC: CPT | Performed by: INTERNAL MEDICINE

## 2025-01-08 RX ORDER — SERTRALINE HYDROCHLORIDE 25 MG/1
25 TABLET, FILM COATED ORAL DAILY
Qty: 90 TABLET | Refills: 0 | Status: SHIPPED | OUTPATIENT
Start: 2025-01-08

## 2025-01-08 ASSESSMENT — PAIN SCALES - GENERAL: PAINLEVEL_OUTOF10: NO PAIN (0)

## 2025-01-08 NOTE — ASSESSMENT & PLAN NOTE
Mood quite low recently with crying most days. PHQ-9 score is elevated today. We discussed it is hard to determine whether low mood is due to frustration with functional decline vs knowing if primary mood disorder like depression is causing some symptoms.   - We discussed trying addition of zoloft 25 mg daily  - Continue working with therapist  - F/up VV 6 weeks

## 2025-01-08 NOTE — ASSESSMENT & PLAN NOTE
Doing well with amlodipine however might be causing issues with lightheadedness.   - Try to decrease amlodipine to 5 mg at night

## 2025-01-08 NOTE — ASSESSMENT & PLAN NOTE
Significant issues with fatigue persist. We had long discussion about this today. Likely multifactorial with SAGAR (just recently got oral appliance), insomnia, low mood, and possible other contributors as well.   - Agree with neurology evaluation, appt set up in April  - Start using oral appliance  - Check some basic labs today   - Continue CBTi  - Starting zoloft for low mood   - F/up 6 weeks VV and in person in April

## 2025-01-08 NOTE — ASSESSMENT & PLAN NOTE
Continues to follow at [facility]. EGD 8/2024 without eosinophils and they have tapered budesonide to every other day without significant worsening in swallowing.   - Continue current budesonide rinses  - F/up with [facility] as scheduled in March

## 2025-01-08 NOTE — PATIENT INSTRUCTIONS
Try zoloft 25 mg daily for mood.     Try 1/2 tablet of amlodipine and taking at night.     Try dental appliance.

## 2025-01-08 NOTE — PROGRESS NOTES
Assessment & Plan   Problem List Items Addressed This Visit          Digestive    Eosinophilic esophagitis     Continues to follow at [facility]. EGD 8/2024 without eosinophils and they have tapered budesonide to every other day without significant worsening in swallowing.   - Continue current budesonide rinses  - F/up with [facility] as scheduled in March            Circulatory    Raynaud's syndrome without gangrene     Doing well with amlodipine however might be causing issues with lightheadedness.   - Try to decrease amlodipine to 5 mg at night             Urinary    Postmenopausal atrophic vaginitis     Estrace cream prescribed GYN.  This has been a little bit helpful.  - Continue estrace cream 2-3 times weekly             Behavioral    Current moderate episode of major depressive disorder without prior episode (H) - Primary     Mood quite low recently with crying most days. PHQ-9 score is elevated today. We discussed it is hard to determine whether low mood is due to frustration with functional decline vs knowing if primary mood disorder like depression is causing some symptoms.   - We discussed trying addition of zoloft 25 mg daily  - Continue working with therapist  - F/up VV 6 weeks          Relevant Medications    sertraline (ZOLOFT) 25 MG tablet       Other    Insomnia     Sleep still an issue and daytime fatigue.   - Continue with CBTi         Chronic fatigue     Significant issues with fatigue persist. We had long discussion about this today. Likely multifactorial with SAGAR (just recently got oral appliance), insomnia, low mood, and possible other contributors as well.   - Agree with neurology evaluation, appt set up in April  - Start using oral appliance  - Check some basic labs today   - Continue CBTi  - Starting zoloft for low mood   - F/up 6 weeks VV and in person in April         Relevant Orders    TSH with free T4 reflex    Comprehensive metabolic panel    CBC with platelets and differential (Completed)     Vitamin D Deficiency    Magnesium    Ferritin     Other Visit Diagnoses       Osteopenia of lumbar spine        Relevant Orders    Vitamin D Deficiency    Hx of iron deficiency        Relevant Orders    Ferritin                  Depression Screening Follow Up        1/7/2025     8:02 PM   PHQ   PHQ-9 Total Score 13    Q9: Thoughts of better off dead/self-harm past 2 weeks Not at all       Patient-reported     Follow Up Actions Taken  Crisis resource information provided in After Visit Summary       The longitudinal plan of care for the diagnosis(es)/condition(s) as documented were addressed during this visit. Due to the added complexity in care, I will continue to support Katty in the subsequent management and with ongoing continuity of care.    I spent a total of 44 minutes on the day of the visit.   Time spent by me today doing chart review, history and exam, documentation and further activities per the note    FUTURE APPOINTMENTS:       - Follow-up visit in 6 weeks VV      Subjective   Katty is a 69 year old, presenting for the following health issues:  Follow Up (Memory, Sleep and Functioning. /Patient has weight loss, lightheadedness and feeling tired. /Patient would like to discuss about amlodipine. )        1/8/2025     9:42 AM   Additional Questions   Roomed by Kevin CISNEROS MA   Accompanied by      History of Present Illness     Reason for visit:  Memory issues, weight loss, lightheadedness    Katty is here with her  Alexis today for general follow up. Overall, feels functioning as continued to decline.     In 110s/120s in 2020/2021. Really since early 2023 has stayed around 110. Slightly lower the last year. Can't keep up. Is working to eat more if possible and having some protein drinks.   Wt Readings from Last 4 Encounters:   01/08/25 49.3 kg (108 lb 11.2 oz)   07/10/24 49.5 kg (109 lb 3.2 oz)   05/13/24 50.4 kg (111 lb 1.6 oz)   01/24/24 50.5 kg (111 lb 4.8 oz)     Mood: Mood lower really  "because of decrease in functional abilities over last few years. Harder time remembering and tracking dates and convesrations. Tired all the time. Tearful most days.     EOE: EGD 8/21/24, no eosinophils on biopsy, consider colonoscopy and CT A/P with weight loss  - They've been doing budesonide every other day since then without too much     Sleep / Insomnia: Met with sleep medicine 9/30/24, considering dental appliance, continue CBTi for insomnia and referral to neurology.   - They tell me today she was just fitted for this and they picked it up Monday    Some lightheadedness and tinnitus.  Intermittent. Wonder if this is worsened by current amlodipine dose for Raynauds.         1/7/2025     8:02 PM   PHQ   PHQ-9 Total Score 13    Q9: Thoughts of better off dead/self-harm past 2 weeks Not at all       Patient-reported     She eats 2-3 servings of fruits and vegetables daily.She consumes 0 sweetened beverage(s) daily.She exercises with enough effort to increase her heart rate 20 to 29 minutes per day.  She exercises with enough effort to increase her heart rate 3 or less days per week.   She is taking medications regularly.         Review of Systems  Constitutional, neuro, ENT, endocrine, pulmonary, cardiac, gastrointestinal, genitourinary, musculoskeletal, integument and psychiatric systems are negative, except as otherwise noted.      Objective    /58   Pulse 61   Temp 97.8  F (36.6  C) (Oral)   Resp 16   Ht 1.676 m (5' 6\")   Wt 49.3 kg (108 lb 11.2 oz)   LMP  (LMP Unknown)   SpO2 99%   BMI 17.54 kg/m    Body mass index is 17.54 kg/m .  Physical Exam   GENERAL: alert and no distress, thin  EYES: Eyes grossly normal to inspection and conjunctivae and sclerae normal  HENT: nose and mouth without ulcers or lesions  RESP: lungs clear to auscultation - no rales, rhonchi or wheezes  CV: regular rate and rhythm, normal S1 S2, no S3 or S4, no murmur, click or rub, no peripheral edema  MS: no gross " musculoskeletal defects noted, no edema  SKIN: no suspicious lesions or rashes on exposed skin   NEURO: No focal deficits mentation intact and speech normal  PSYCH: mentation appears normal, affect normal but tearful through exam    Results for orders placed or performed in visit on 01/08/25 (from the past 24 hours)   CBC with platelets and differential    Narrative    The following orders were created for panel order CBC with platelets and differential.  Procedure                               Abnormality         Status                     ---------                               -----------         ------                     CBC with platelets and d...[[id number]]                      Final result                 Please view results for these tests on the individual orders.   CBC with platelets and differential   Result Value Ref Range    WBC Count 4.4 4.0 - 11.0 10e3/uL    RBC Count 4.04 3.80 - 5.20 10e6/uL    Hemoglobin 12.6 11.7 - 15.7 g/dL    Hematocrit 36.7 35.0 - 47.0 %    MCV 91 78 - 100 fL    MCH 31.2 26.5 - 33.0 pg    MCHC 34.3 31.5 - 36.5 g/dL    RDW 12.1 10.0 - 15.0 %    Platelet Count 247 150 - 450 10e3/uL    % Neutrophils 61 %    % Lymphocytes 31 %    % Monocytes 8 %    % Eosinophils 1 %    % Basophils 1 %    % Immature Granulocytes 0 %    Absolute Neutrophils 2.7 1.6 - 8.3 10e3/uL    Absolute Lymphocytes 1.3 0.8 - 5.3 10e3/uL    Absolute Monocytes 0.3 0.0 - 1.3 10e3/uL    Absolute Eosinophils 0.0 0.0 - 0.7 10e3/uL    Absolute Basophils 0.0 0.0 - 0.2 10e3/uL    Absolute Immature Granulocytes 0.0 <=0.4 10e3/uL           Signed Electronically by: Manasa Watson MD

## 2025-01-13 ENCOUNTER — MYC MEDICAL ADVICE (OUTPATIENT)
Dept: INTERNAL MEDICINE | Facility: CLINIC | Age: 70
End: 2025-01-13
Payer: MEDICARE

## 2025-01-13 DIAGNOSIS — F32.1 CURRENT MODERATE EPISODE OF MAJOR DEPRESSIVE DISORDER WITHOUT PRIOR EPISODE (H): Primary | ICD-10-CM

## 2025-01-14 NOTE — TELEPHONE ENCOUNTER
MYC MSG:  Dr. Watson,     I seem to be having a reaction to Zoloft (after 2 doses this weekend). My head is in a fog, and I feel much more anxious. I did not take this morning's dose and am feeling moderately better. Is there another medication I can try?     Thank you,     Katty Faye to PCP to review and advise       Marco A Jackson Jr., CMA on 1/14/2025 at 9:35 AM

## 2025-01-15 NOTE — TELEPHONE ENCOUNTER
Took 2 doses of Zoloft- has had issues with concentration, foggy head.  Does not feel normal. Daily functions are limited due to the foggy head.   Increased anxiety after taking. Still having depression issues.   Last dose Sunday    Did let pt know that typically medication takes 2 weeks to see some impact.  Due to side effects, patient does not want to continue medication. Is requesting a different class of medication that Dr Watson suggests

## 2025-01-16 RX ORDER — BUPROPION HYDROCHLORIDE 75 MG/1
75 TABLET ORAL 2 TIMES DAILY
Qty: 180 TABLET | Refills: 0 | Status: SHIPPED | OUTPATIENT
Start: 2025-01-16

## 2025-01-16 NOTE — TELEPHONE ENCOUNTER
MYC MSG:  Dr. Watson,     Trying Wellbutrin sounds like a good idea. With the EoE, I have been crushing pills. I checked with our pharmacist, and she recommend taking Wellbutrin 75 mg twice daily, which can be crushed. (not the XL formulation)     How does that sound to you?     Thanks. I really appreciate your help.     Katty      Medication is chetna'd up for verification and approval, if appropriate.       Thank you,  Marco A Jackson Jr., CMA on 1/16/2025 at 7:07 AM

## 2025-02-05 RX ORDER — TIMOLOL MALEATE 6.8 MG/ML
1 SOLUTION OPHTHALMIC 2 TIMES DAILY
Qty: 60 ML | Refills: 12 | OUTPATIENT
Start: 2025-02-05

## 2025-02-05 NOTE — TELEPHONE ENCOUNTER
Nettie no longer at [facility],@ [facility] [phone]

## 2025-02-17 ASSESSMENT — ANXIETY QUESTIONNAIRES
4. TROUBLE RELAXING: SEVERAL DAYS
5. BEING SO RESTLESS THAT IT IS HARD TO SIT STILL: NOT AT ALL
6. BECOMING EASILY ANNOYED OR IRRITABLE: SEVERAL DAYS
2. NOT BEING ABLE TO STOP OR CONTROL WORRYING: MORE THAN HALF THE DAYS
7. FEELING AFRAID AS IF SOMETHING AWFUL MIGHT HAPPEN: MORE THAN HALF THE DAYS
8. IF YOU CHECKED OFF ANY PROBLEMS, HOW DIFFICULT HAVE THESE MADE IT FOR YOU TO DO YOUR WORK, TAKE CARE OF THINGS AT HOME, OR GET ALONG WITH OTHER PEOPLE?: SOMEWHAT DIFFICULT
GAD7 TOTAL SCORE: 8
7. FEELING AFRAID AS IF SOMETHING AWFUL MIGHT HAPPEN: MORE THAN HALF THE DAYS
GAD7 TOTAL SCORE: 8
3. WORRYING TOO MUCH ABOUT DIFFERENT THINGS: SEVERAL DAYS
1. FEELING NERVOUS, ANXIOUS, OR ON EDGE: SEVERAL DAYS
GAD7 TOTAL SCORE: 8
IF YOU CHECKED OFF ANY PROBLEMS ON THIS QUESTIONNAIRE, HOW DIFFICULT HAVE THESE PROBLEMS MADE IT FOR YOU TO DO YOUR WORK, TAKE CARE OF THINGS AT HOME, OR GET ALONG WITH OTHER PEOPLE: SOMEWHAT DIFFICULT

## 2025-02-19 ENCOUNTER — VIRTUAL VISIT (OUTPATIENT)
Dept: INTERNAL MEDICINE | Facility: CLINIC | Age: 70
End: 2025-02-19
Payer: MEDICARE

## 2025-02-19 DIAGNOSIS — G47.33 OSA (OBSTRUCTIVE SLEEP APNEA): ICD-10-CM

## 2025-02-19 DIAGNOSIS — R53.82 CHRONIC FATIGUE: ICD-10-CM

## 2025-02-19 DIAGNOSIS — K20.0 EOSINOPHILIC ESOPHAGITIS: ICD-10-CM

## 2025-02-19 DIAGNOSIS — F32.1 CURRENT MODERATE EPISODE OF MAJOR DEPRESSIVE DISORDER WITHOUT PRIOR EPISODE (H): Primary | ICD-10-CM

## 2025-02-19 DIAGNOSIS — F51.01 PRIMARY INSOMNIA: ICD-10-CM

## 2025-02-19 RX ORDER — BUPROPION HYDROCHLORIDE 100 MG/1
100 TABLET ORAL 2 TIMES DAILY
Qty: 180 TABLET | Refills: 1 | Status: SHIPPED | OUTPATIENT
Start: 2025-02-19

## 2025-02-19 RX ORDER — BUPROPION HYDROCHLORIDE 75 MG/1
TABLET ORAL
COMMUNITY
Start: 2025-02-01 | End: 2025-02-19

## 2025-02-19 ASSESSMENT — PATIENT HEALTH QUESTIONNAIRE - PHQ9: SUM OF ALL RESPONSES TO PHQ QUESTIONS 1-9: 15

## 2025-02-19 NOTE — ASSESSMENT & PLAN NOTE
Continues to follow at [facility]. EGD 8/2024 without eosinophils and they have tapered budesonide to every other day without significant worsening in swallowing.   - Continue current budesonide rinses  - We will check a.m. cortisol to be sure she has not developed adrenal insufficiency while on long-term budesonide rinses  - F/up with [facility] as scheduled in March

## 2025-02-19 NOTE — ASSESSMENT & PLAN NOTE
Mood seems slightly better with addition of Wellbutrin.  Seems that initial improvement in mood has tapered off of bed.  Discussed that it would be very reasonable to increase the dose slightly to see if we can get more improvement, they are in agreement with doing this.  -Increase Wellbutrin from 75 mg twice daily to 100 mg twice daily  -Follow-up in April as scheduled

## 2025-02-19 NOTE — PROGRESS NOTES
Katty is a 70 year old who is being evaluated via a billable video visit.    How would you like to obtain your AVS? MyChart  If the video visit is dropped, the invitation should be resent by: Text to cell phone: [phone]  Will anyone else be joining your video visit? No      Assessment & Plan   Problem List Items Addressed This Visit          Respiratory    SAGAR (obstructive sleep apnea)     Following with sleep medicine. Unable to tolerate first oral appliance, having a new one made tomorrow.             Digestive    Eosinophilic esophagitis     Continues to follow at [facility]. EGD 8/2024 without eosinophils and they have tapered budesonide to every other day without significant worsening in swallowing.   - Continue current budesonide rinses  - We will check a.m. cortisol to be sure she has not developed adrenal insufficiency while on long-term budesonide rinses  - F/up with [facility] as scheduled in March            Behavioral    Current moderate episode of major depressive disorder without prior episode (H) - Primary     Mood seems slightly better with addition of Wellbutrin.  Seems that initial improvement in mood has tapered off of bed.  Discussed that it would be very reasonable to increase the dose slightly to see if we can get more improvement, they are in agreement with doing this.  -Increase Wellbutrin from 75 mg twice daily to 100 mg twice daily  -Follow-up in April as scheduled         Relevant Medications    buPROPion (WELLBUTRIN) 100 MG tablet    Other Relevant Orders    Cortisol       Other    Insomnia     Sleep has improved with CBT-I, she is able to fall asleep much more easily.  - Continue CBT-I  - Getting new oral appliance for SAGAR tomorrow         Chronic fatigue     Ongoing. Slight improvement with wellbutrin but having a very difficult time with current oral appliance. Sleep not restorative.   - Increasing wellbutrin as per MDD  - They are going to get new dental appliance tomorrow  - Son had good  idea to check AM cortisol given she has been on oral budesonide chronically for EOE to rule out adrenal insufficiency  - F/up in April          Relevant Orders    Cortisol      The longitudinal plan of care for the diagnosis(es)/condition(s) as documented were addressed during this visit. Due to the added complexity in care, I will continue to support Katty in the subsequent management and with ongoing continuity of care.       FUTURE APPOINTMENTS:       - Follow-up visit in April as scheduled       Subjective   Katty is a 70 year old, presenting for the following health issues:   Follow Up        2/19/2025    10:18 AM   Additional Questions   Roomed by JUAN Hopkins   Accompanied by      Video Start Time: 10:36 AM    History of Present Illness     At our visit 1/8/25 planned to start zoloft. However, she sent message 1/13 saying that after 2 visits she had issues with concentration and foggy head. So, we instead switched to wellbutrin 75 mg BID, started 1/25. Does think this has been helpful. Felt a little bit better right away mentally, more hopeful.  thinks crying a little bit less. However, overall, tells me today she is tired and discouraged. Feels like things mental acuity wise are worse.     Sleep f/up yesterday. Cont dental appliance (trying a new one). She tells me this is much better. Can go to bed and fall asleep. CBTi seems to have help with this. Doesn't feel it is restorative sleep.     She eats 2-3 servings of fruits and vegetables daily.She consumes 0 sweetened beverage(s) daily.She exercises with enough effort to increase her heart rate 10 to 19 minutes per day.  She exercises with enough effort to increase her heart rate 5 days per week.     She is taking medications regularly.    Review of Systems  Constitutional, neuro, ENT, endocrine, pulmonary, cardiac, gastrointestinal, genitourinary, musculoskeletal, integument and psychiatric systems are negative, except as otherwise  "noted.      Objective    Vitals - Patient Reported  Weight (Patient Reported): 48.1 kg (106 lb)  Height (Patient Reported): 170.2 cm (5' 7\")  BMI (Based on Pt Reported Ht/Wt): 16.6  Pain Score: No Pain (0)    Physical Exam   GENERAL: alert and no distress  EYES: Eyes grossly normal to inspection.  No discharge or erythema, or obvious scleral/conjunctival abnormalities.  RESP: No audible wheeze, cough, or visible cyanosis.    SKIN: Visible skin clear. No significant rash, abnormal pigmentation or lesions.  NEURO: Cranial nerves grossly intact.  Mentation and speech appropriate for age.  PSYCH: Appropriate affect, tone, and pace of words        Video-Visit Details    Type of service:  Video Visit   Video End Time: 10:50  Originating Location (pt. Location): Home    Distant Location (provider location):  On-site  Platform used for Video Visit: John  Signed Electronically by: Manasa Watson MD    "

## 2025-02-19 NOTE — ASSESSMENT & PLAN NOTE
Following with sleep medicine. Unable to tolerate first oral appliance, having a new one made tomorrow.

## 2025-02-19 NOTE — ASSESSMENT & PLAN NOTE
Sleep has improved with CBT-I, she is able to fall asleep much more easily.  - Continue CBT-I  - Getting new oral appliance for SAGAR tomorrow

## 2025-02-19 NOTE — ASSESSMENT & PLAN NOTE
Ongoing. Slight improvement with wellbutrin but having a very difficult time with current oral appliance. Sleep not restorative.   - Increasing wellbutrin as per MDD  - They are going to get new dental appliance tomorrow  - Son had good idea to check AM cortisol given she has been on oral budesonide chronically for EOE to rule out adrenal insufficiency  - F/up in April

## 2025-02-26 ENCOUNTER — PATIENT OUTREACH (OUTPATIENT)
Dept: CARE COORDINATION | Facility: CLINIC | Age: 70
End: 2025-02-26
Payer: MEDICARE

## 2025-03-02 ENCOUNTER — APPOINTMENT (OUTPATIENT)
Dept: CT IMAGING | Facility: HOSPITAL | Age: 70
End: 2025-03-02
Payer: COMMERCIAL

## 2025-03-02 ENCOUNTER — HOSPITAL ENCOUNTER (EMERGENCY)
Facility: HOSPITAL | Age: 70
Discharge: HOME | End: 2025-03-02
Attending: STUDENT IN AN ORGANIZED HEALTH CARE EDUCATION/TRAINING PROGRAM | Admitting: STUDENT IN AN ORGANIZED HEALTH CARE EDUCATION/TRAINING PROGRAM
Payer: COMMERCIAL

## 2025-03-02 ENCOUNTER — APPOINTMENT (OUTPATIENT)
Dept: RADIOLOGY | Facility: HOSPITAL | Age: 70
End: 2025-03-02
Payer: COMMERCIAL

## 2025-03-02 VITALS
WEIGHT: 108 LBS | DIASTOLIC BLOOD PRESSURE: 75 MMHG | OXYGEN SATURATION: 99 % | SYSTOLIC BLOOD PRESSURE: 140 MMHG | TEMPERATURE: 98 F | RESPIRATION RATE: 16 BRPM | BODY MASS INDEX: 16.95 KG/M2 | HEIGHT: 67 IN | HEART RATE: 56 BPM
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
## 2025-03-02 PROCEDURE — 70450 CT HEAD/BRAIN W/O DYE: CPT

## 2025-03-02 PROCEDURE — 99284 EMERGENCY DEPT VISIT MOD MDM: CPT | Mod: 25

## 2025-03-02 PROCEDURE — 73130 X-RAY EXAM OF HAND: CPT | Mod: LT

## 2025-03-02 PROCEDURE — 250N000013 HC RX MED GY IP 250 OP 250 PS 637

## 2025-03-02 PROCEDURE — 72125 CT NECK SPINE W/O DYE: CPT

## 2025-03-02 PROCEDURE — 73110 X-RAY EXAM OF WRIST: CPT | Mod: LT

## 2025-03-02 RX ORDER — ACETAMINOPHEN 325 MG/1
650 TABLET ORAL ONCE
Status: COMPLETED | OUTPATIENT
Start: 2025-03-02 | End: 2025-03-02

## 2025-03-02 RX ADMIN — ACETAMINOPHEN 650 MG: 325 TABLET ORAL at 22:01

## 2025-03-02 ASSESSMENT — COLUMBIA-SUICIDE SEVERITY RATING SCALE - C-SSRS
2. HAVE YOU ACTUALLY HAD ANY THOUGHTS OF KILLING YOURSELF IN THE PAST MONTH?: NO
6. HAVE YOU EVER DONE ANYTHING, STARTED TO DO ANYTHING, OR PREPARED TO DO ANYTHING TO END YOUR LIFE?: NO
1. IN THE PAST MONTH, HAVE YOU WISHED YOU WERE DEAD OR WISHED YOU COULD GO TO SLEEP AND NOT WAKE UP?: NO

## 2025-03-02 ASSESSMENT — ACTIVITIES OF DAILY LIVING (ADL)
ADLS_ACUITY_SCORE: 41
ADLS_ACUITY_SCORE: 41
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
## 2025-03-10 ENCOUNTER — MYC MEDICAL ADVICE (OUTPATIENT)
Dept: INTERNAL MEDICINE | Facility: CLINIC | Age: 70
End: 2025-03-10

## 2025-03-10 ENCOUNTER — LAB (OUTPATIENT)
Dept: LAB | Facility: CLINIC | Age: 70
End: 2025-03-10
Payer: MEDICARE

## 2025-03-10 DIAGNOSIS — R53.82 CHRONIC FATIGUE: ICD-10-CM

## 2025-03-10 DIAGNOSIS — F32.1 CURRENT MODERATE EPISODE OF MAJOR DEPRESSIVE DISORDER WITHOUT PRIOR EPISODE (H): ICD-10-CM

## 2025-03-10 LAB — CORTIS SERPL-MCNC: 22.3 UG/DL

## 2025-03-10 PROCEDURE — 82533 TOTAL CORTISOL: CPT

## 2025-03-10 PROCEDURE — 36415 COLL VENOUS BLD VENIPUNCTURE: CPT
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
## 2025-03-11 NOTE — TELEPHONE ENCOUNTER
Patient Returning Call    Reason for call:  Returning Call to Clinic    Information relayed to patient:  Accepted offer to schedule first available with PCP 3/21/25. Patient and spouse on phone feels that needs to be seen sooner, did accepted offer to schedule with colleague of Dr. Manasa Ospina NP Thursday 03/13/25.     Patient has additional questions:  No

## 2025-03-13 ENCOUNTER — OFFICE VISIT (OUTPATIENT)
Dept: INTERNAL MEDICINE | Facility: CLINIC | Age: 70
End: 2025-03-13
Payer: COMMERCIAL

## 2025-03-13 VITALS
SYSTOLIC BLOOD PRESSURE: 136 MMHG | OXYGEN SATURATION: 99 % | BODY MASS INDEX: 16.79 KG/M2 | HEIGHT: 67 IN | RESPIRATION RATE: 16 BRPM | HEART RATE: 65 BPM | DIASTOLIC BLOOD PRESSURE: 74 MMHG | WEIGHT: 107 LBS | TEMPERATURE: 98.1 F
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
## 2025-03-13 DIAGNOSIS — F32.1 CURRENT MODERATE EPISODE OF MAJOR DEPRESSIVE DISORDER WITHOUT PRIOR EPISODE (H): ICD-10-CM

## 2025-03-13 NOTE — LETTER
My Depression Action Plan  Name: Katty Patton   Date of Birth 1955  Date: 3/13/2025    My doctor: Manasa Watson   My clinic: [address] [id number]  [phone]            GREEN    ZONE   Good Control    What it looks like:   Things are going generally well. You have normal ups and downs. You may even feel depressed from time to time, but bad moods usually last less than a day.   What you need to do:  Continue to care for yourself (see self care plan)  Check your depression survival kit and update it as needed  Follow your physician s recommendations including any medication.  Do not stop taking medication unless you consult with your physician first.             YELLOW         ZONE Getting Worse    What it looks like:   Depression is starting to interfere with your life.   It may be hard to get out of bed; you may be starting to isolate yourself from others.  Symptoms of depression are starting to last most all day and this has happened for several days.   You may have suicidal thoughts but they are not constant.   What you need to do:     Call your care team. Your response to treatment will improve if you keep your care team informed of your progress. Yellow periods are signs an adjustment may need to be made.     Continue your self-care.  Just get dressed and ready for the day.  Don't give yourself time to talk yourself out of it.    Talk to someone in your support network.    Open up your Depression Self-Care Plan/Wellness Kit.             RED    ZONE Medical Alert - Get Help    What it looks like:   Depression is seriously interfering with your life.   You may experience these or other symptoms: You can t get out of bed most days, can t work or engage in other necessary activities, you have trouble taking care of basic hygiene, or basic responsibilities, thoughts of suicide or death that will not go away,  self-injurious behavior.     What you need to do:  Call your care team and request a same-day appointment. If they are not available (weekends or after hours) call your local crisis line, emergency room or 911.          Depression Self-Care Plan / Wellness Kit    Many people find that medication and therapy are helpful treatments for managing depression. In addition, making small changes to your everyday life can help to boost your mood and improve your wellbeing. Below are some tips for you to consider. Be sure to talk with your medical provider and/or behavioral health consultant if your symptoms are worsening or not improving.     Sleep   Sleep hygiene  means all of the habits that support good, restful sleep. It includes maintaining a consistent bedtime and wake time, using your bedroom only for sleeping or sex, and keeping the bedroom dark and free of distractions like a computer, smartphone, or television.     Develop a Healthy Routine  Maintain good hygiene. Get out of bed in the morning, make your bed, brush your teeth, take a shower, and get dressed. Don t spend too much time viewing media that makes you feel stressed. Find time to relax each day.    Exercise  Get some form of exercise every day. This will help reduce pain and release endorphins, the  feel good  chemicals in your brain. It can be as simple as just going for a walk or doing some gardening, anything that will get you moving.      Diet  Strive to eat healthy foods, including fruits and vegetables. Drink plenty of water. Avoid excessive sugar, caffeine, alcohol, and other mood-altering substances.     Stay Connected with Others  Stay in touch with friends and family members.    Manage Your Mood  Try deep breathing, massage therapy, biofeedback, or meditation. Take part in fun activities when you can. Try to find something to smile about each day.     Psychotherapy  Be open to working with a therapist if your provider recommends it.      Medication  Be sure to take your medication as prescribed. Most anti-depressants need to be taken every day. It usually takes several weeks for medications to work. Not all medicines work for all people. It is important to follow-up with your provider to make sure you have a treatment plan that is working for you. Do not stop your medication abruptly without first discussing it with your provider.    Crisis Resources   These hotlines are for both adults and children. They and are open 24 hours a day, 7 days a week unless noted otherwise.    National Suicide Prevention Lifeline   988 or 3-092-418-TBJD (4694)    Crisis Text Line    www.crisistextline.org  Text HOME to 521411 from anywhere in the [place], anytime, about any type of crisis. A live, trained crisis counselor will receive the text and respond quickly.    [place] Lifeline for LGBTQ Youth  A national crisis intervention and suicide lifeline for LGBTQ youth under 25. Provides a safe place to talk without judgement. Call 1-572.348.2415; text START to 170084 or visit www.thetrevorproject.org to talk to a trained counselor.    For [facility] crisis numbers, visit the [facility] website at:  https://[place].gov/dhs/people-we-serve/adults/health-care/mental-health/resources/crisis-contacts.jsp
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
## 2025-03-13 NOTE — ASSESSMENT & PLAN NOTE
Notes ongoing depression.  She is working with her CBT-I provider to get established with someone that can provide CBT with a depression focus.  The dose of Wellbutrin was increased to 100 mg twice daily 3 weeks ago  -At this time I do not recommend a dose adjustment since office was made so recently  -Follow-up with PCP as scheduled April 7

## 2025-03-25 ENCOUNTER — THERAPY VISIT (OUTPATIENT)
Age: 70
End: 2025-03-25
Attending: NURSE PRACTITIONER
Payer: COMMERCIAL
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
## 2025-03-25 DIAGNOSIS — M54.2 CERVICALGIA: ICD-10-CM

## 2025-03-25 DIAGNOSIS — M25.512 LEFT SHOULDER PAIN: Primary | ICD-10-CM

## 2025-03-25 PROCEDURE — 97161 PT EVAL LOW COMPLEX 20 MIN: CPT | Mod: GP | Performed by: PHYSICAL THERAPIST

## 2025-03-25 PROCEDURE — 97140 MANUAL THERAPY 1/> REGIONS: CPT | Mod: GP | Performed by: PHYSICAL THERAPIST

## 2025-03-25 PROCEDURE — 97110 THERAPEUTIC EXERCISES: CPT | Mod: GP | Performed by: PHYSICAL THERAPIST

## 2025-03-25 ASSESSMENT — ACTIVITIES OF DAILY LIVING (ADL)
PUSHING_WITH_THE_INVOLVED_ARM: 2
REACHING_FOR_SOMETHING_ON_A_HIGH_SHELF: 4
PLACING_AN_OBJECT_ON_A_HIGH_SHELF: 5
NECK_DISABILITY_IN_PERCENT: 30
WASHING_YOUR_BACK: 4
REMOVING_SOMETHING_FROM_YOUR_BACK_POCKET: 4
NECK_DISABILITY_TOTAL_SCORE: 15
PLEASE_INDICATE_YOR_PRIMARY_REASON_FOR_REFERRAL_TO_THERAPY:: UPPER BACK, AND/OR NECK
AT_ITS_WORST?: 4
NECK_DISABILITY_INDEX:_COUNT: 10
PUTTING_ON_A_SHIRT_THAT_BUTTONS_DOWN_THE_FRONT: 4
PUTTING_ON_YOUR_PANTS: 4
WASHING_YOUR_HAIR?: 4
PUTTING_ON_AN_UNDERSHIRT_OR_A_PULLOVER_SWEATER: 5
PLEASE_INDICATE_YOR_PRIMARY_REASON_FOR_REFERRAL_TO_THERAPY:: SHOULDER
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
## 2025-03-26 ENCOUNTER — PATIENT OUTREACH (OUTPATIENT)
Dept: CARE COORDINATION | Facility: CLINIC | Age: 70
End: 2025-03-26
Payer: MEDICARE

## 2025-04-02 ENCOUNTER — THERAPY VISIT (OUTPATIENT)
Age: 70
End: 2025-04-02
Payer: COMMERCIAL

## 2025-04-02 DIAGNOSIS — M25.512 LEFT SHOULDER PAIN: Primary | ICD-10-CM

## 2025-04-02 PROCEDURE — 97140 MANUAL THERAPY 1/> REGIONS: CPT | Mod: GP | Performed by: PHYSICAL THERAPIST

## 2025-04-02 PROCEDURE — 97112 NEUROMUSCULAR REEDUCATION: CPT | Mod: GP | Performed by: PHYSICAL THERAPIST

## 2025-04-02 PROCEDURE — 97110 THERAPEUTIC EXERCISES: CPT | Mod: GP | Performed by: PHYSICAL THERAPIST

## 2025-04-06 ASSESSMENT — PATIENT HEALTH QUESTIONNAIRE - PHQ9
SUM OF ALL RESPONSES TO PHQ QUESTIONS 1-9: 15
SUM OF ALL RESPONSES TO PHQ QUESTIONS 1-9: 15
10. IF YOU CHECKED OFF ANY PROBLEMS, HOW DIFFICULT HAVE THESE PROBLEMS MADE IT FOR YOU TO DO YOUR WORK, TAKE CARE OF THINGS AT HOME, OR GET ALONG WITH OTHER PEOPLE: VERY DIFFICULT

## 2025-04-07 ENCOUNTER — OFFICE VISIT (OUTPATIENT)
Dept: INTERNAL MEDICINE | Facility: CLINIC | Age: 70
End: 2025-04-07
Payer: MEDICARE

## 2025-04-07 VITALS
HEART RATE: 65 BPM | DIASTOLIC BLOOD PRESSURE: 81 MMHG | BODY MASS INDEX: 17.09 KG/M2 | WEIGHT: 108.9 LBS | SYSTOLIC BLOOD PRESSURE: 131 MMHG | OXYGEN SATURATION: 99 % | HEIGHT: 67 IN | RESPIRATION RATE: 16 BRPM | TEMPERATURE: 97.7 F

## 2025-04-07 DIAGNOSIS — I73.00 RAYNAUD'S SYNDROME WITHOUT GANGRENE: ICD-10-CM

## 2025-04-07 DIAGNOSIS — K20.0 EOSINOPHILIC ESOPHAGITIS: ICD-10-CM

## 2025-04-07 DIAGNOSIS — F51.01 PRIMARY INSOMNIA: ICD-10-CM

## 2025-04-07 DIAGNOSIS — M85.88 OSTEOPENIA OF LUMBAR SPINE: ICD-10-CM

## 2025-04-07 DIAGNOSIS — G47.33 OSA (OBSTRUCTIVE SLEEP APNEA): ICD-10-CM

## 2025-04-07 DIAGNOSIS — F32.1 CURRENT MODERATE EPISODE OF MAJOR DEPRESSIVE DISORDER WITHOUT PRIOR EPISODE (H): Primary | ICD-10-CM

## 2025-04-07 PROCEDURE — G2211 COMPLEX E/M VISIT ADD ON: HCPCS | Performed by: INTERNAL MEDICINE

## 2025-04-07 PROCEDURE — 99214 OFFICE O/P EST MOD 30 MIN: CPT | Performed by: INTERNAL MEDICINE

## 2025-04-07 PROCEDURE — 3079F DIAST BP 80-89 MM HG: CPT | Performed by: INTERNAL MEDICINE

## 2025-04-07 PROCEDURE — 3075F SYST BP GE 130 - 139MM HG: CPT | Performed by: INTERNAL MEDICINE

## 2025-04-07 RX ORDER — BUPROPION HYDROCHLORIDE 75 MG/1
150 TABLET ORAL 2 TIMES DAILY
Qty: 360 TABLET | Refills: 1 | Status: SHIPPED | OUTPATIENT
Start: 2025-04-07
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
## 2025-04-07 NOTE — ASSESSMENT & PLAN NOTE
Depression does seem improved with addition of Wellbutrin, however certainly still room for improvement.  Still tearful throughout the day and having some concentration issues.  - Increase wellbutrin 100 mg BID -> 150 mg BID  - Continue CBT  - F/up in May as scheduled

## 2025-04-07 NOTE — ASSESSMENT & PLAN NOTE
Helped with amlodipine but she is noting some dizziness given on 5 mg dose.  -Going to have them try stopping the amlodipine since the weather has been warmer

## 2025-04-07 NOTE — ASSESSMENT & PLAN NOTE
Call transferred from [facility]. Patient returning call, ok to schedule port removal per Dr. Nicho Sin and Dr. Freddie Trinh. Patient scheduled 10/20/2017 at [facility]. Pre op instructions given, phone number and home address verified.  Written pre op instructions mailed to pat Continues to follow at [facility]. EGD 8/2024 without eosinophils and they have tapered budesonide to every other day without significant worsening in swallowing.   - Continue current budesonide rinses  - Continue to f/up with [facility]

## 2025-04-07 NOTE — PATIENT INSTRUCTIONS
Increase wellbutrin to 150 mg twice daily (two 75 mg tablets).     You will get a call to schedule bone density scan. Or can call [phone] ([phone]) to schedule.

## 2025-04-07 NOTE — PROGRESS NOTES
Assessment & Plan   Problem List Items Addressed This Visit          Respiratory    SAGAR (obstructive sleep apnea)      is still noticing some apnea. Still 1.5 mm to go on oral appliance.   - Continue to advance appliance  - If still having apnea and non-restorative sleep when it has advanced all the way, they will f/up with sleep             Digestive    Eosinophilic esophagitis     Continues to follow at [facility]. EGD 8/2024 without eosinophils and they have tapered budesonide to every other day without significant worsening in swallowing.   - Continue current budesonide rinses  - Continue to f/up with [facility]            Circulatory    Raynaud's syndrome without gangrene     Helped with amlodipine but she is noting some dizziness given on 5 mg dose.  -Going to have them try stopping the amlodipine since the weather has been warmer            Musculoskeletal and Integumentary    Osteopenia of lumbar spine     DEXA 3/2023 with lowest T-score of -1.6 in the lumbar spine.  -Repeat DEXA due, ordered today         Relevant Orders    DX Bone Density       Behavioral    Current moderate episode of major depressive disorder without prior episode (H) - Primary     Depression does seem improved with addition of Wellbutrin, however certainly still room for improvement.  Still tearful throughout the day and having some concentration issues.  - Increase wellbutrin 100 mg BID -> 150 mg BID  - Continue CBT  - F/up in May as scheduled          Relevant Medications    buPROPion (WELLBUTRIN) 75 MG tablet       Other    Insomnia     Sleep has improved with CBT-I, she is able to fall asleep much more easily.   - Continue CBT-I and oral appliance            The longitudinal plan of care for the diagnosis(es)/condition(s) as documented were addressed during this visit. Due to the added complexity in care, I will continue to support Katty in the subsequent management and with ongoing continuity of care.       FUTURE APPOINTMENTS:      "  - Follow-up visit in May for AWV as scheduled       Subjective   Katty is a 70 year old, presenting for the following health issues:  Sleep Problem (Follow up. Also memory and depression. Wanting to discuss the wellbutrin.)        4/7/2025     7:50 AM   Additional Questions   Roomed by JUAN Garcia   Accompanied by      History of Present Illness     Reason for visit:  Follow up    Here for f/up of fatigue, depression, cognition and sleep. Last VV 2/19/25 we increased wellbutrin slightly from 75 mg BID -> 100 mg BID. Continues with CBT-I (although shifting more to CBT, will get a new psychologist in May b/c current is leaving the system). Also got new oral appliance for SAGAR at our last visit   - Still another 1.5 mm to get to max    Tells me that at first she noticed an improvement but not as much as we would hope for.     Much better than 2 months ago. Crying a little less.     Wt Readings from Last 4 Encounters:   04/07/25 49.4 kg (108 lb 14.4 oz)   03/13/25 48.5 kg (107 lb)   03/02/25 49 kg (108 lb)   01/08/25 49.3 kg (108 lb 11.2 oz)     Found a . Doing some strength training (legs, arms and core)    Some dizziness when going down stairs or with sitting down. Lasts <5 minutes. Currently doing 5 mg of amlodipine at night.     She consumes 0 sweetened beverage(s) daily.She exercises with enough effort to increase her heart rate 20 to 29 minutes per day.  She exercises with enough effort to increase her heart rate 6 days per week.   She is taking medications regularly.              Review of Systems  Constitutional, neuro, ENT, endocrine, pulmonary, cardiac, gastrointestinal, genitourinary, musculoskeletal, integument and psychiatric systems are negative, except as otherwise noted.      Objective    /81   Pulse 65   Temp 97.7  F (36.5  C) (Oral)   Resp 16   Ht 1.702 m (5' 7\")   Wt 49.4 kg (108 lb 14.4 oz)   LMP  (LMP Unknown)   SpO2 99%   BMI 17.06 kg/m    Body mass index is " 17.06 kg/m .  Physical Exam   GENERAL: healthy, alert and no distress  EYES: Eyes grossly normal to inspection and conjunctivae and sclerae normal  HENT: nose and mouth without ulcers or lesions  RESP: breathing comfortably and speaking in full sentences on room air with no respiratory distress or coughing  CV: warm and well perfused  SKIN: no suspicious lesions or rashes on exposed skin  NEURO: No focal deficits, mentation intact and speech normal  PSYCH: mentation appears normal, affect normal/bright            Signed Electronically by: Manasa Watson MD

## 2025-04-07 NOTE — ASSESSMENT & PLAN NOTE
Sleep has improved with CBT-I, she is able to fall asleep much more easily.   - Continue CBT-I and oral appliance

## 2025-04-07 NOTE — ASSESSMENT & PLAN NOTE
is still noticing some apnea. Still 1.5 mm to go on oral appliance.   - Continue to advance appliance  - If still having apnea and non-restorative sleep when it has advanced all the way, they will f/up with sleep

## 2025-04-08 ENCOUNTER — LAB (OUTPATIENT)
Dept: LAB | Facility: HOSPITAL | Age: 70
End: 2025-04-08
Payer: MEDICARE

## 2025-04-08 ENCOUNTER — OFFICE VISIT (OUTPATIENT)
Dept: NEUROLOGY | Facility: CLINIC | Age: 70
End: 2025-04-08
Attending: INTERNAL MEDICINE
Payer: MEDICARE

## 2025-04-08 VITALS
DIASTOLIC BLOOD PRESSURE: 82 MMHG | HEIGHT: 67 IN | BODY MASS INDEX: 17.11 KG/M2 | HEART RATE: 58 BPM | WEIGHT: 109 LBS | SYSTOLIC BLOOD PRESSURE: 130 MMHG

## 2025-04-08 DIAGNOSIS — G30.9 ALZHEIMER'S DISEASE (H): ICD-10-CM

## 2025-04-08 DIAGNOSIS — F02.80 ALZHEIMER'S DISEASE (H): Primary | ICD-10-CM

## 2025-04-08 DIAGNOSIS — F02.80 ALZHEIMER'S DISEASE (H): ICD-10-CM

## 2025-04-08 DIAGNOSIS — G30.9 ALZHEIMER'S DISEASE (H): Primary | ICD-10-CM

## 2025-04-08 LAB
FOLATE SERPL-MCNC: >40 NG/ML (ref 4.6–34.8)
VIT B12 SERPL-MCNC: 672 PG/ML (ref 232–1245)

## 2025-04-08 PROCEDURE — 84425 ASSAY OF VITAMIN B-1: CPT

## 2025-04-08 PROCEDURE — 36415 COLL VENOUS BLD VENIPUNCTURE: CPT

## 2025-04-08 PROCEDURE — 3075F SYST BP GE 130 - 139MM HG: CPT | Performed by: PSYCHIATRY & NEUROLOGY

## 2025-04-08 PROCEDURE — 99205 OFFICE O/P NEW HI 60 MIN: CPT | Performed by: PSYCHIATRY & NEUROLOGY

## 2025-04-08 PROCEDURE — 3079F DIAST BP 80-89 MM HG: CPT | Performed by: PSYCHIATRY & NEUROLOGY

## 2025-04-08 PROCEDURE — G2211 COMPLEX E/M VISIT ADD ON: HCPCS | Performed by: PSYCHIATRY & NEUROLOGY

## 2025-04-08 ASSESSMENT — MONTREAL COGNITIVE ASSESSMENT (MOCA)
11. FOR EACH PAIR OF WORDS, WHAT CATEGORY DO THEY BELONG TO (OUT OF 2): 2
10. [FLUENCY] NAME WORDS STARTING WITH DESIGNATED LETTER: 1
8. SERIAL SUBTRACTION OF 7S: 1
WHAT IS THE TOTAL SCORE (OUT OF 30): 19
WHAT LEVEL OF EDUCATION WAS ATTAINED: 0
9. REPEAT EACH SENTENCE: 2
7. [VIGILENCE] TAP WHEN HEARING DESIGNATED LETTER: 0
13. ORIENTATION SUBSCORE: 4
VISUOSPATIAL/EXECUTIVE SUBSCORE: 2
12. MEMORY INDEX SCORE: 2
4. NAME EACH OF THE THREE ANIMALS SHOWN: 3
6. READ LIST OF DIGITS [FORWARD/BACKWARD]: 2

## 2025-04-08 NOTE — NURSING NOTE
Chief Complaint   Patient presents with    Memory Loss      starting noticing a decline in 2023. Seen by Dr. Mendez for neuropsych, did ok on neuropsych. She was diagnosed with SAGAR, started on a CPAP.  states she has shown a steady decline with memory, becoming frustrated and anxious. Pt states she has been crying more.       Bernice Moralez LPN on 4/8/2025 at 9:23 AM

## 2025-04-08 NOTE — NURSING NOTE
[facility] COGNITIVE ASSESSMENT (MOCA)  Version 7.1 Original Version  VISUOSPATIAL/EXECUTIVE               COPY CUBE      [   0 ]                                [ 0   ] DRAW CLOCK (Ten past eleven)  (3 points)    [   1 ]                    [ 1   ]               [  0  ]       Contour            Numbers     Hands POINTS                  2 / 5   NAMING    [ 1  ]                                                                        [  1  ]                                             [ 1   ]  Limaria a Pereyra                                Camel                     3/ 3   MEMORY Read list of words, subject must repeat them. Do 2 trials, even if 1st trial is successful. Do a recall after 5 minutes  FACE VELVET [employer] NOEMI RED No Points    1st          2nd         ATTENTION Read list of digits (1 digit/sec) Subject has to repeat in the forward order       [  1  ]   2  1  8  5  4                                [ 1   ] 7 4 2                          2/2   Read list of letters. The subject must tap with his hand at each letter A. No points if > 2 errors.  [ 0   ] F B A C M N A A J K L B A F A K D E A A A J A M O F A A B             0 /1   Serial 7 subtraction starting at 100          [  1 ] 93         [  0  ] 86          [ 0   ] 79          [    0] 72         [   0 ] 65   4 or 5 correct subtractions: 3 points,  2 or 3 correct: 2 points,  1correct: 1 point,   0 correct: 0 points           1 /3   LANGUAGE Repeat: I only know that Timi is the one to help today. [   1  ]                                      The cat always hid under the couch when dogs were in the room. [ 1  ]               2/2   Fluency: Name maximum number of words in one minute that begin with the letter F                                                                                                                    [   1 ] ___ (N > 11 words)               1/1   ABSTRACTION Similarity  between e.g. banana-orange=fruit                                                                   [ 1   ] train-bicycle                      [  1  ] watch-ruler              2/2   DELAYED  RECALL Has to recall words  WITH NO CUE FACE  [  1  ] VELVET  [  1  ] Voodoo  [   0 ]  NOEMI  [ 0   ] RED  [  0  ] Points for UNCUED recall only           2 /5           OPTIONAL Category cue           Multiple choice cue          ORIENTATION  [  0  ] Date     [   1 ] Month       [   1 ] Year      [ 0   ] Day      [  1  ] Place        [  1  ] City         4 /6   TOTAL  Normal > 26/30 Add 1 point if < 12 years education       19 /30

## 2025-04-09 ENCOUNTER — THERAPY VISIT (OUTPATIENT)
Age: 70
End: 2025-04-09
Payer: COMMERCIAL

## 2025-04-09 DIAGNOSIS — M25.512 LEFT SHOULDER PAIN: Primary | ICD-10-CM

## 2025-04-09 PROCEDURE — 97140 MANUAL THERAPY 1/> REGIONS: CPT | Mod: GP | Performed by: PHYSICAL THERAPIST

## 2025-04-09 PROCEDURE — 97110 THERAPEUTIC EXERCISES: CPT | Mod: GP | Performed by: PHYSICAL THERAPIST

## 2025-04-10 LAB — VIT B1 PYROPHOSHATE BLD-SCNC: 114 NMOL/L

## 2025-04-11 LAB
APOE ALLELE E2+E3+E4 BLD/T: ABNORMAL
SPECIMEN SOURCE: ABNORMAL

## 2025-04-13 ENCOUNTER — MYC MEDICAL ADVICE (OUTPATIENT)
Dept: INTERNAL MEDICINE | Facility: CLINIC | Age: 70
End: 2025-04-13
Payer: MEDICARE

## 2025-04-14 ENCOUNTER — PATIENT OUTREACH (OUTPATIENT)
Dept: GASTROENTEROLOGY | Facility: CLINIC | Age: 70
End: 2025-04-14
Payer: MEDICARE

## 2025-04-14 ENCOUNTER — THERAPY VISIT (OUTPATIENT)
Age: 70
End: 2025-04-14
Attending: NURSE PRACTITIONER
Payer: COMMERCIAL

## 2025-04-14 DIAGNOSIS — M25.512 LEFT SHOULDER PAIN: Primary | ICD-10-CM

## 2025-04-14 PROCEDURE — 97110 THERAPEUTIC EXERCISES: CPT | Mod: GP | Performed by: PHYSICAL THERAPIST

## 2025-04-14 NOTE — TELEPHONE ENCOUNTER
Incoming call from patient's spouse with patient herself present.    As noted in the MyChart message from yesterday afternoon.  Their belief is that the increased dose of Wellbutrin is causing side effects such as nausea insomnia and lightheadedness.   Denies any immediate red flag symptoms.    Patient's  has decreased Wellbutrin dose back to 100 mg twice daily as of yesterday and is looking for PCP's input on this change    Guzman Clemons RN     [facility]

## 2025-04-14 NOTE — TELEPHONE ENCOUNTER
I would agree with going back down to 100 mg twice daily for now. We can schedule another virtual visit with my first available approval required (not same day) slot to discuss other options.

## 2025-04-15 NOTE — TELEPHONE ENCOUNTER
Relayed to  and wife provider advisement.  Reports they are leaving to go out of town Tuesday April 22nd until May 3, no approval required appts available before they leave.  Patient is not wanting to wait until they return from the trip, reporting frustration with ongoing brain fog and some lightheadedness.  Spouse is wondering if she should be tapering off Wellbutrin completely.  Reports patient will be starting CBT with a therapist next month as well.

## 2025-04-17 ENCOUNTER — THERAPY VISIT (OUTPATIENT)
Age: 70
End: 2025-04-17
Payer: COMMERCIAL

## 2025-04-17 DIAGNOSIS — M25.512 LEFT SHOULDER PAIN: Primary | ICD-10-CM

## 2025-04-21 ENCOUNTER — VIRTUAL VISIT (OUTPATIENT)
Dept: INTERNAL MEDICINE | Facility: CLINIC | Age: 70
End: 2025-04-21
Payer: MEDICARE

## 2025-04-21 DIAGNOSIS — I73.00 RAYNAUD'S SYNDROME WITHOUT GANGRENE: Primary | ICD-10-CM

## 2025-04-21 DIAGNOSIS — F32.1 CURRENT MODERATE EPISODE OF MAJOR DEPRESSIVE DISORDER WITHOUT PRIOR EPISODE (H): ICD-10-CM

## 2025-04-21 DIAGNOSIS — F51.01 PRIMARY INSOMNIA: ICD-10-CM

## 2025-04-21 PROBLEM — R03.0 ELEVATED BLOOD PRESSURE READING WITHOUT DIAGNOSIS OF HYPERTENSION: Status: RESOLVED | Noted: 2024-07-10 | Resolved: 2025-04-21

## 2025-04-21 PROCEDURE — 98006 SYNCH AUDIO-VIDEO EST MOD 30: CPT | Performed by: INTERNAL MEDICINE

## 2025-04-21 RX ORDER — BUPROPION HYDROCHLORIDE 100 MG/1
50 TABLET ORAL 2 TIMES DAILY
Qty: 90 TABLET | Refills: 1 | Status: SHIPPED | OUTPATIENT
Start: 2025-04-21

## 2025-04-21 ASSESSMENT — ANXIETY QUESTIONNAIRES
2. NOT BEING ABLE TO STOP OR CONTROL WORRYING: MORE THAN HALF THE DAYS
7. FEELING AFRAID AS IF SOMETHING AWFUL MIGHT HAPPEN: MORE THAN HALF THE DAYS
1. FEELING NERVOUS, ANXIOUS, OR ON EDGE: MORE THAN HALF THE DAYS
3. WORRYING TOO MUCH ABOUT DIFFERENT THINGS: MORE THAN HALF THE DAYS
IF YOU CHECKED OFF ANY PROBLEMS ON THIS QUESTIONNAIRE, HOW DIFFICULT HAVE THESE PROBLEMS MADE IT FOR YOU TO DO YOUR WORK, TAKE CARE OF THINGS AT HOME, OR GET ALONG WITH OTHER PEOPLE: SOMEWHAT DIFFICULT
GAD7 TOTAL SCORE: 12
8. IF YOU CHECKED OFF ANY PROBLEMS, HOW DIFFICULT HAVE THESE MADE IT FOR YOU TO DO YOUR WORK, TAKE CARE OF THINGS AT HOME, OR GET ALONG WITH OTHER PEOPLE?: SOMEWHAT DIFFICULT
6. BECOMING EASILY ANNOYED OR IRRITABLE: SEVERAL DAYS
GAD7 TOTAL SCORE: 12
4. TROUBLE RELAXING: MORE THAN HALF THE DAYS
5. BEING SO RESTLESS THAT IT IS HARD TO SIT STILL: SEVERAL DAYS
GAD7 TOTAL SCORE: 12
7. FEELING AFRAID AS IF SOMETHING AWFUL MIGHT HAPPEN: MORE THAN HALF THE DAYS

## 2025-04-21 NOTE — ASSESSMENT & PLAN NOTE
Dizziness somewhat better with stopping amlodipine (and decreasing wellbutrin).   - Since weather is better, will stay off amlodipine for now

## 2025-04-21 NOTE — PROGRESS NOTES
Katty is a 70 year old who is being evaluated via a billable video visit.    How would you like to obtain your AVS? MyChart  If the video visit is dropped, the invitation should be resent by: Text to cell phone: [phone]  Will anyone else be joining your video visit? No      Assessment & Plan   Problem List Items Addressed This Visit          Circulatory    Raynaud's syndrome without gangrene - Primary     Dizziness somewhat better with stopping amlodipine (and decreasing wellbutrin).   - Since weather is better, will stay off amlodipine for now            Behavioral    Current moderate episode of major depressive disorder without prior episode (H)     Depression did improve with addition of wellbutrin. However, had onset of dizziness. Last visit (4/7/25) we planned to increase wellbutrin to better address depressive symptoms but dizziness worsened. Through MyChart/phone discussions, we decreased dose again, since Friday (4/18) down to 50 mg BID. Dizziness is better. Mood is about the same.   - We discussed mood changes take 2-4 weeks to catch up with any dose change, so hesitant to totally stop or change to a new medication, they are in agreement  - Will stay on wellbutrin 50 mg BID for now  - Continue CBT  - F/up with me in May, sooner if issues          Relevant Medications    buPROPion (WELLBUTRIN) 100 MG tablet       Other    Insomnia     Sleep has improved with CBT-I, she is able to fall asleep much more easily.   - Continue CBT-I and oral appliance            The longitudinal plan of care for the diagnosis(es)/condition(s) as documented were addressed during this visit. Due to the added complexity in care, I will continue to support Katty in the subsequent management and with ongoing continuity of care.     Follow up in May as scheduled     Subjective   Katty is a 70 year old, presenting for the following health issues:  Medication Follow-up (Bupropion - cut them in half, that helps some with the  "side effect(Lightlessness) pt states my moods is up an down now.  /Pt states I would consider a CBT. )        4/21/2025     1:17 PM   Additional Questions   Roomed by Guero Willoughby     Video Start Time:  1:36    History of Present Illness     Here for mental health f/up. Plan at [facility] 4/7/25 was to increase bupropion 100 mg BID -> 150 mg BID. Also had them stop amlodipine for dizziness. But concern this worsened dizziness, so we have since decreased. Most recently down to 50 mg BID since Friday.     Today, says she is doing okay. Feeling less lightheadedness. Mood up and down. Overall better, still a ways to go.     Continues to get frustrated with memory issues.     They do have travel planned.    Formal CBT will start in May.     Totally off amlodipine.     She eats 4 or more servings of fruits and vegetables daily.She consumes 0 sweetened beverage(s) daily.She exercises with enough effort to increase her heart rate 10 to 19 minutes per day.  She exercises with enough effort to increase her heart rate 5 days per week.   She is taking medications regularly.          Review of Systems  Constitutional, neuro, ENT, endocrine, pulmonary, cardiac, gastrointestinal, genitourinary, musculoskeletal, integument and psychiatric systems are negative, except as otherwise noted.      Objective    Vitals - Patient Reported  Weight (Patient Reported): 47.6 kg (105 lb)  Height (Patient Reported): 170.2 cm (5' 7\")  BMI (Based on Pt Reported Ht/Wt): 16.45      Vitals:  No vitals were obtained today due to virtual visit.    Physical Exam   GENERAL: alert and no distress  EYES: Eyes grossly normal to inspection.  No discharge or erythema, or obvious scleral/conjunctival abnormalities.  RESP: No audible wheeze, cough, or visible cyanosis.    SKIN: Visible skin clear. No significant rash, abnormal pigmentation or lesions.  NEURO: Cranial nerves grossly intact.  Mentation and speech appropriate for age.  PSYCH: Appropriate affect, tone, and " pace of words        Video-Visit Details    Type of service:  Video Visit   Video End Time: 1:52  Originating Location (pt. Location): Home    Distant Location (provider location):  On-site  Platform used for Video Visit: John  Signed Electronically by: Manasa Watson MD

## 2025-04-21 NOTE — ASSESSMENT & PLAN NOTE
Depression did improve with addition of wellbutrin. However, had onset of dizziness. Last visit (4/7/25) we planned to increase wellbutrin to better address depressive symptoms but dizziness worsened. Through MyChart/phone discussions, we decreased dose again, since Friday (4/18) down to 50 mg BID. Dizziness is better. Mood is about the same.   - We discussed mood changes take 2-4 weeks to catch up with any dose change, so hesitant to totally stop or change to a new medication, they are in agreement  - Will stay on wellbutrin 50 mg BID for now  - Continue CBT  - F/up with me in May, sooner if issues

## 2025-05-06 ENCOUNTER — THERAPY VISIT (OUTPATIENT)
Age: 70
End: 2025-05-06
Payer: COMMERCIAL

## 2025-05-06 ENCOUNTER — ANCILLARY PROCEDURE (OUTPATIENT)
Dept: MAMMOGRAPHY | Facility: HOSPITAL | Age: 70
End: 2025-05-06
Attending: INTERNAL MEDICINE
Payer: MEDICARE

## 2025-05-06 ENCOUNTER — HOSPITAL ENCOUNTER (OUTPATIENT)
Dept: BONE DENSITY | Facility: HOSPITAL | Age: 70
Discharge: HOME | End: 2025-05-06
Attending: INTERNAL MEDICINE
Payer: MEDICARE

## 2025-05-06 DIAGNOSIS — Z12.31 ENCOUNTER FOR SCREENING MAMMOGRAM FOR BREAST CANCER: ICD-10-CM

## 2025-05-06 DIAGNOSIS — M25.512 LEFT SHOULDER PAIN: Primary | ICD-10-CM

## 2025-05-06 DIAGNOSIS — M85.88 OSTEOPENIA OF LUMBAR SPINE: ICD-10-CM

## 2025-05-06 PROCEDURE — 97140 MANUAL THERAPY 1/> REGIONS: CPT | Mod: GP | Performed by: PHYSICAL THERAPIST

## 2025-05-06 PROCEDURE — 97110 THERAPEUTIC EXERCISES: CPT | Mod: GP | Performed by: PHYSICAL THERAPIST

## 2025-05-06 PROCEDURE — 77080 DXA BONE DENSITY AXIAL: CPT

## 2025-05-06 PROCEDURE — 77067 SCR MAMMO BI INCL CAD: CPT

## 2025-05-07 ENCOUNTER — RESULTS FOLLOW-UP (OUTPATIENT)
Dept: INTERNAL MEDICINE | Facility: CLINIC | Age: 70
End: 2025-05-07

## 2025-05-09 ENCOUNTER — ANCILLARY PROCEDURE (OUTPATIENT)
Dept: NEUROLOGY | Facility: CLINIC | Age: 70
End: 2025-05-09
Attending: PSYCHIATRY & NEUROLOGY
Payer: MEDICARE

## 2025-05-09 DIAGNOSIS — F02.80 ALZHEIMER'S DISEASE (H): ICD-10-CM

## 2025-05-09 DIAGNOSIS — G30.9 ALZHEIMER'S DISEASE (H): ICD-10-CM

## 2025-05-09 PROCEDURE — 95819 EEG AWAKE AND ASLEEP: CPT | Performed by: PSYCHIATRY & NEUROLOGY

## 2025-05-13 ENCOUNTER — THERAPY VISIT (OUTPATIENT)
Age: 70
End: 2025-05-13
Payer: COMMERCIAL

## 2025-05-13 DIAGNOSIS — M25.512 LEFT SHOULDER PAIN: Primary | ICD-10-CM

## 2025-05-13 PROCEDURE — 97110 THERAPEUTIC EXERCISES: CPT | Mod: GP | Performed by: PHYSICAL THERAPIST

## 2025-05-13 PROCEDURE — 97140 MANUAL THERAPY 1/> REGIONS: CPT | Mod: GP | Performed by: PHYSICAL THERAPIST

## 2025-05-27 ENCOUNTER — THERAPY VISIT (OUTPATIENT)
Age: 70
End: 2025-05-27
Payer: COMMERCIAL

## 2025-05-27 ENCOUNTER — HOSPITAL ENCOUNTER (OUTPATIENT)
Dept: MRI IMAGING | Facility: HOSPITAL | Age: 70
Discharge: HOME | End: 2025-05-27
Attending: PSYCHIATRY & NEUROLOGY
Payer: MEDICARE

## 2025-05-27 DIAGNOSIS — M25.512 LEFT SHOULDER PAIN: Primary | ICD-10-CM

## 2025-05-27 DIAGNOSIS — G30.9 ALZHEIMER'S DISEASE (H): ICD-10-CM

## 2025-05-27 DIAGNOSIS — F02.80 ALZHEIMER'S DISEASE (H): ICD-10-CM

## 2025-05-27 PROCEDURE — A9585 GADOBUTROL INJECTION: HCPCS | Performed by: PSYCHIATRY & NEUROLOGY

## 2025-05-27 PROCEDURE — 255N000002 HC RX 255 OP 636: Performed by: PSYCHIATRY & NEUROLOGY

## 2025-05-27 PROCEDURE — 70553 MRI BRAIN STEM W/O & W/DYE: CPT

## 2025-05-27 PROCEDURE — 97140 MANUAL THERAPY 1/> REGIONS: CPT | Mod: GP | Performed by: PHYSICAL THERAPIST

## 2025-05-27 PROCEDURE — 97110 THERAPEUTIC EXERCISES: CPT | Mod: GP | Performed by: PHYSICAL THERAPIST

## 2025-05-27 RX ORDER — GADOBUTROL 604.72 MG/ML
0.1 INJECTION INTRAVENOUS ONCE
Status: COMPLETED | OUTPATIENT
Start: 2025-05-27 | End: 2025-05-27

## 2025-05-27 RX ADMIN — GADOBUTROL 5 ML: 604.72 INJECTION INTRAVENOUS at 17:31

## 2025-05-27 SDOH — HEALTH STABILITY: PHYSICAL HEALTH: ON AVERAGE, HOW MANY MINUTES DO YOU ENGAGE IN EXERCISE AT THIS LEVEL?: 20 MIN

## 2025-05-27 SDOH — HEALTH STABILITY: PHYSICAL HEALTH: ON AVERAGE, HOW MANY DAYS PER WEEK DO YOU ENGAGE IN MODERATE TO STRENUOUS EXERCISE (LIKE A BRISK WALK)?: 5 DAYS

## 2025-05-27 ASSESSMENT — ANXIETY QUESTIONNAIRES
4. TROUBLE RELAXING: MORE THAN HALF THE DAYS
7. FEELING AFRAID AS IF SOMETHING AWFUL MIGHT HAPPEN: NEARLY EVERY DAY
7. FEELING AFRAID AS IF SOMETHING AWFUL MIGHT HAPPEN: NEARLY EVERY DAY
3. WORRYING TOO MUCH ABOUT DIFFERENT THINGS: NEARLY EVERY DAY
6. BECOMING EASILY ANNOYED OR IRRITABLE: SEVERAL DAYS
GAD7 TOTAL SCORE: 13
IF YOU CHECKED OFF ANY PROBLEMS ON THIS QUESTIONNAIRE, HOW DIFFICULT HAVE THESE PROBLEMS MADE IT FOR YOU TO DO YOUR WORK, TAKE CARE OF THINGS AT HOME, OR GET ALONG WITH OTHER PEOPLE: VERY DIFFICULT
2. NOT BEING ABLE TO STOP OR CONTROL WORRYING: NEARLY EVERY DAY
1. FEELING NERVOUS, ANXIOUS, OR ON EDGE: SEVERAL DAYS
GAD7 TOTAL SCORE: 13
5. BEING SO RESTLESS THAT IT IS HARD TO SIT STILL: NOT AT ALL
8. IF YOU CHECKED OFF ANY PROBLEMS, HOW DIFFICULT HAVE THESE MADE IT FOR YOU TO DO YOUR WORK, TAKE CARE OF THINGS AT HOME, OR GET ALONG WITH OTHER PEOPLE?: VERY DIFFICULT
GAD7 TOTAL SCORE: 13

## 2025-05-27 ASSESSMENT — SOCIAL DETERMINANTS OF HEALTH (SDOH): HOW OFTEN DO YOU GET TOGETHER WITH FRIENDS OR RELATIVES?: TWICE A WEEK

## 2025-05-27 ASSESSMENT — PATIENT HEALTH QUESTIONNAIRE - PHQ9
SUM OF ALL RESPONSES TO PHQ QUESTIONS 1-9: 13
10. IF YOU CHECKED OFF ANY PROBLEMS, HOW DIFFICULT HAVE THESE PROBLEMS MADE IT FOR YOU TO DO YOUR WORK, TAKE CARE OF THINGS AT HOME, OR GET ALONG WITH OTHER PEOPLE: VERY DIFFICULT
SUM OF ALL RESPONSES TO PHQ QUESTIONS 1-9: 13

## 2025-05-28 ENCOUNTER — OFFICE VISIT (OUTPATIENT)
Dept: INTERNAL MEDICINE | Facility: CLINIC | Age: 70
End: 2025-05-28
Payer: MEDICARE

## 2025-05-28 VITALS
TEMPERATURE: 97.9 F | SYSTOLIC BLOOD PRESSURE: 152 MMHG | DIASTOLIC BLOOD PRESSURE: 80 MMHG | WEIGHT: 107.7 LBS | OXYGEN SATURATION: 100 % | RESPIRATION RATE: 16 BRPM | HEIGHT: 67 IN | HEART RATE: 61 BPM | BODY MASS INDEX: 16.9 KG/M2

## 2025-05-28 DIAGNOSIS — G47.33 OBSTRUCTIVE SLEEP APNEA SYNDROME: ICD-10-CM

## 2025-05-28 DIAGNOSIS — Z00.00 ENCOUNTER FOR MEDICARE ANNUAL WELLNESS EXAM: Primary | ICD-10-CM

## 2025-05-28 DIAGNOSIS — R41.3 MEMORY CHANGES: ICD-10-CM

## 2025-05-28 DIAGNOSIS — F32.1 CURRENT MODERATE EPISODE OF MAJOR DEPRESSIVE DISORDER WITHOUT PRIOR EPISODE (H): ICD-10-CM

## 2025-05-28 DIAGNOSIS — F51.01 PRIMARY INSOMNIA: ICD-10-CM

## 2025-05-28 DIAGNOSIS — I73.00 RAYNAUD'S SYNDROME WITHOUT GANGRENE: ICD-10-CM

## 2025-05-28 DIAGNOSIS — K20.0 EOSINOPHILIC ESOPHAGITIS: ICD-10-CM

## 2025-05-28 DIAGNOSIS — R42 DIZZINESS: ICD-10-CM

## 2025-05-28 DIAGNOSIS — N95.2 POSTMENOPAUSAL ATROPHIC VAGINITIS: ICD-10-CM

## 2025-05-28 PROBLEM — R22.1 LOCALIZED SWELLING, MASS AND LUMP, NECK: Status: RESOLVED | Noted: 2024-07-10 | Resolved: 2025-05-28

## 2025-05-28 PROCEDURE — G2211 COMPLEX E/M VISIT ADD ON: HCPCS | Performed by: INTERNAL MEDICINE

## 2025-05-28 PROCEDURE — 1126F AMNT PAIN NOTED NONE PRSNT: CPT | Performed by: INTERNAL MEDICINE

## 2025-05-28 PROCEDURE — 3077F SYST BP >= 140 MM HG: CPT | Performed by: INTERNAL MEDICINE

## 2025-05-28 PROCEDURE — 3079F DIAST BP 80-89 MM HG: CPT | Performed by: INTERNAL MEDICINE

## 2025-05-28 PROCEDURE — 99214 OFFICE O/P EST MOD 30 MIN: CPT | Mod: 25 | Performed by: INTERNAL MEDICINE

## 2025-05-28 PROCEDURE — G0439 PPPS, SUBSEQ VISIT: HCPCS | Performed by: INTERNAL MEDICINE

## 2025-05-28 ASSESSMENT — PAIN SCALES - GENERAL: PAINLEVEL_OUTOF10: NO PAIN (0)

## 2025-05-28 NOTE — ASSESSMENT & PLAN NOTE
Still having intermittent dizziness.  Especially during the summer months, we will stay off of amlodipine.

## 2025-05-28 NOTE — ASSESSMENT & PLAN NOTE
Ongoing. Saw neurology 5/9/25. Brain MRI normal 5/27/25. Most likely related to uncontrolled MDD.   - Mgmt of MDD as noted elsewhere  - would continue aricept 5 mg daily   - Really work on fluid and protein intake  - F/up with Dr. Morel as scheduled in August

## 2025-05-28 NOTE — PROGRESS NOTES
Preventive Care Visit  [facility] Sultana Watson MD, Internal Medicine  May 28, 2025      Assessment & Plan   Problem List Items Addressed This Visit          Nervous and Auditory    Dizziness    Continues to have intermittent dizziness.  Did improve with decreasing Wellbutrin dose and stopping amlodipine.  Now mainly notices with standing up.  Discussed that this seems consistent with at least some orthostasis.  She does endorse that she does not drink enough water.  -They are going to add fluid intake to her daily check list, discussed goal of at least 60 ounces  -Continue daily protein drink as well            Respiratory    Obstructive sleep apnea syndrome    She is still struggling with the oral appliance.  Having some dental pain.  They are going to plan to try to advance a little bit further, however if pain continues, likely will try CPAP instead.  -Agree with this plan, they will follow-up with sleep medicine if they would like to try CPAP            Digestive    Eosinophilic esophagitis    Continues to follow at [facility]. EGD 8/2024 without eosinophils and they have tapered budesonide to every other day without significant worsening in swallowing.   - Continue current budesonide rinses  - Continue to f/up with [facility]            Circulatory    Raynaud's syndrome without gangrene    Still having intermittent dizziness.  Especially during the summer months, we will stay off of amlodipine.            Urinary    Postmenopausal atrophic vaginitis    Estrace cream prescribed GYN.  This has been a little bit helpful.  - Continue estrace cream 2-3 times weekly             Behavioral    Current moderate episode of major depressive disorder without prior episode (H)    Depression remains uncontrolled.  Mood did improve with addition of Wellbutrin.  However, increasing past 50 mg twice daily caused dizziness.  This dizziness has improved with decreasing the dose again.  She did not  tolerate Zoloft due to brain fog and worsening concentration.  We discussed today that we have the option of adding another medication class versus seeing psychiatry for their input.  We all agree that seeing psychiatry would be best for specialist insight regarding next medication steps.  -[facility]PS referral placed today   -Continue wellbutrin 50 mg BID  -Continue every 2 weeks therapy with new therapist  -F/up with me in 3 months          Relevant Orders    Adult Mental Health  Referral       Other    Insomnia    Sleep has improved with CBT-I, she is able to fall asleep much more easily.   - Continue CBT-I techniques and oral appliance          Encounter for Medicare annual wellness exam - Primary    We discussed healthy lifestyle, nutrition, cardiovascular risk reduction, self care, safety, sunscreen, and timing of cancer screening.  Health maintenance screening and immunizations reviewed with the patient.    - Labs updated and normal 1/2025, needs lipids but we will do next time   - Mammogram BIRADS1 5/6/25  - Colonoscopy due 7/2025, will discuss more next time, can wait until mood is doing better  - UTD on vaccines, will get Tdap 2026         Relevant Orders    Lipid panel reflex to direct LDL Fasting    Memory changes    Ongoing. Saw neurology 5/9/25. Brain MRI normal 5/27/25. Most likely related to uncontrolled MDD.   - Mgmt of MDD as noted elsewhere  - would continue aricept 5 mg daily   - Really work on fluid and protein intake  - F/up with Dr. Morel as scheduled in August             Patient has been advised of split billing requirements and indicates understanding: Yes       Counseling  Appropriate preventive services were addressed with this patient via screening, questionnaire, or discussion as appropriate for fall prevention, nutrition, physical activity, Tobacco-use cessation, social engagement, weight loss and cognition.  Checklist reviewing preventive services available has been given to the  "patient.  Reviewed patient's diet, addressing concerns and/or questions.   She is at risk for psychosocial distress and has been provided with information to reduce risk.   Discussed possible causes of fatigue. Updated plan of care.  Patient reported difficulty with activities of daily living were addressed today.The patient's PHQ-9 score is consistent with moderate depression. She was provided with information regarding depression.     The longitudinal plan of care for the diagnosis(es)/condition(s) as documented were addressed during this visit. Due to the added complexity in care, I will continue to support Katty in the subsequent management and with ongoing continuity of care.    Follow-up  Return in about 3 months (around 2025).    Subjective   Katty is a 70 year old, presenting for the following:  Wellness Visit (Discuss mood and medications. /Patient has been experiencing shortness of breath. )        2025     8:22 AM   Additional Questions   Roomed by Kevin CISNEROS MA   Accompanied by           HPI    Katty is here for AWV today. Says things are going \"okay\". Feels arrington and uncontrolled. More tearful again. They are going to  later today, which is also stressful.     Osteopenia: DEXA 2025 6.4% decrease in lumbar spine BMD at 2.4% decrease in bilateral hip BMD.  Lowest T-score in the lumbar spine -2.1    Memory: neurology 25, start aricept 5 mg daily, recommend daily boost or protein supplement and staying very very well-hydrated. Checking brain MRI, f/up Aug 2025.   - Brain MRI yesterday was normal     MDD: started with new therapist 25, plan for visits every 2 weeks. Remains on wellbutrin 50 mg BID    Dizziness / WEBBER: we stopped amlodipine and decreased wellbutrin in last few months.     Insomnia: better with CBT-I    SAGAR: Appliance is going okay, they are going to follow up with sleep medicine again. They might go to CPAP instead if ongoing pain despite advancing " further     EOE: budesonide rinses     BIRADS1 5/6/25    Advance Care Planning    Discussed advance care planning with patient; informed AVS has link to [facility].        5/27/2025   General Health   How would you rate your overall physical health? (!) FAIR   Feel stress (tense, anxious, or unable to sleep) Very much   (!) STRESS CONCERN      5/27/2025   Nutrition   Diet: Regular (no restrictions)    Other   If other, please elaborate: More protein       Multiple values from one day are sorted in reverse-chronological order         5/27/2025   Exercise   Days per week of moderate/strenous exercise 5 days   Average minutes spent exercising at this level 20 min         5/27/2025   Social Factors   Frequency of gathering with friends or relatives Twice a week   Worry food won't last until get money to buy more No   Food not last or not have enough money for food? No   Do you have housing? (Housing is defined as stable permanent housing and does not include staying outside in a car, in a tent, in an abandoned building, in an overnight shelter, or couch-surfing.) Yes   Are you worried about losing your housing? No   Lack of transportation? No   Unable to get utilities (heat,electricity)? No         5/28/2025   Fall Risk   Gait Speed Test (Document in seconds) 7.62   Gait Speed Test Interpretation Greater than 5.01 seconds - ABNORMAL          5/27/2025   Activities of Daily Living- Home Safety   Needs help with the following daily activites Transportation    Medication administration   Safety concerns in the home None of the above       Multiple values from one day are sorted in reverse-chronological order         5/27/2025   Dental   Dentist two times every year? Yes         5/27/2025   Hearing Screening   Hearing concerns? None of the above         5/27/2025   Driving Risk Screening   Patient/family members have concerns about driving (!) DECLINE         5/27/2025   General Alertness/Fatigue Screening   Have you  been more tired than usual lately? (!) YES         5/27/2025   Urinary Incontinence Screening   Bothered by leaking urine in past 6 months No       Today's PHQ-9 Score:       5/27/2025     4:33 PM   PHQ-9 SCORE   PHQ-9 Total Score MyChart 13 (Moderate depression)   PHQ-9 Total Score 13        Patient-reported         5/27/2025   Substance Use   Alcohol more than 3/day or more than 7/wk No   Do you have a current opioid prescription? No   How severe/bad is pain from 1 to 10? 0/10 (No Pain)   Do you use any other substances recreationally? No     Social History     Tobacco Use    Smoking status: Never    Smokeless tobacco: Never   Substance Use Topics    Alcohol use: Yes     Comment: Alcoholic Drinks/day: occ    Drug use: No           5/6/2025   LAST FHS-7 RESULTS   1st degree relative breast or ovarian cancer No   Any relative bilateral breast cancer No   Any male have breast cancer No   Any ONE woman have BOTH breast AND ovarian cancer No   Any woman with breast cancer before 50yrs No   2 or more relatives with breast AND/OR ovarian cancer No   2 or more relatives with breast AND/OR bowel cancer No        Mammogram Screening - Mammogram every 1-2 years updated in Health Maintenance based on mutual decision making      History of abnormal Pap smear: No - age 65 or older with adequate negative prior screening test results (3 consecutive negative cytology results, 2 consecutive negative cotesting results, or 2 consecutive negative HrHPV test results within 10 years, with the most recent test occurring within the recommended screening interval for the test used)        Latest Ref Rng & Units 12/11/2023    12:00 PM 1/28/2020     2:53 PM 9/13/2016    10:36 AM   PAP / HPV   PAP  Negative for Intraepithelial Lesion or Malignancy (NILM)  Negative for squamous intraepithelial lesion or malignancy  Electronically signed by Brittney Curry CT (ASCP) on 2/4/2020 at  3:10 PM    Negative for squamous intraepithelial lesion or  malignancy  Electronically signed by Emmy Mendieta CT (ASCP) on 9/22/2016 at  2:38 PM      HPV 16 DNA Negative Negative  Negative     HPV 18 DNA Negative Negative  Negative     Other HR HPV Negative Negative  Negative       ASCVD Risk   The 10-year ASCVD risk score (Liberty COBB, et al., 2019) is: 12.2%    Values used to calculate the score:      Age: 70 years      Sex: Female      Is Non- : No      Diabetic: No      Tobacco smoker: No      Systolic Blood Pressure: 152 mmHg      Is BP treated: No      HDL Cholesterol: 96 mg/dL      Total Cholesterol: 228 mg/dL    Reviewed and updated as needed this visit by Provider   Tobacco  Allergies  Meds  Problems  Med Hx  Surg Hx  Fam Hx            Past Medical History:   Diagnosis Date    Bradycardia 08/14/2015    Asymptomatic Possibly related to vagal tone    Esophageal rupture     Glaucoma (increased eye pressure)     Hypertension     new no meds    Hypertension     Nuclear sclerosis of both eyes     Oral thrush 03/29/2023    Pre-syncope 08/14/2015    Occasional episodes in her teens and as an adult     Past Surgical History:   Procedure Laterality Date    GENITOURINARY SURGERY  Tubal Ligation    GLAUCOMA SURGERY      ZZC LIGATE FALLOPIAN TUBE      Description: Tubal Ligation;  Recorded: 09/18/2009;     Current providers sharing in care for this patient include:  Patient Care Team:  Manasa Watson MD as PCP - General  Isacc Gill MD as Kitty Pinzon MD as MD (Ophthalmology)  Manasa Watson MD as Assigned PCP  Sterling Baptiste DO as Assigned Sleep Provider  Murphy Morel MD as MD (Neurology)  Murphy Morel MD as Assigned Neuroscience Provider    The following health maintenance items are reviewed in Epic and correct as of today:  Health Maintenance   Topic Date Due    ANNUAL REVIEW OF HM ORDERS  Never done    COVID-19 VACCINE (10 - 2024-25 season) 06/05/2025     "DEPRESSION 6 MO INDEX REPEAT PHQ-9  06/11/2025    COLORECTAL CANCER SCREENING  07/14/2025    PHQ-9  11/28/2025    MEDICARE ANNUAL WELLNESS VISIT  05/28/2026    FALL RISK ASSESSMENT  05/28/2026    MAMMO SCREENING  05/06/2027    DIABETES SCREENING  01/08/2028    LIPID  01/30/2029    ADVANCE CARE PLANNING  05/28/2030    DTAP/TDAP/TD VACCINE (2 - Td or Tdap) 10/03/2034    DEXA  05/06/2040    HEPATITIS C SCREENING  Completed    DEPRESSION ACTION PLAN  Completed    INFLUENZA VACCINE  Completed    PNEUMOCOCCAL VACCINE 50+ YEARS  Completed    ZOSTER VACCINE  Completed    RSV VACCINE  Completed    HPV VACCINE  Aged Out    MENINGITIS VACCINE  Aged Out       Review of Systems  Constitutional, neuro, ENT, endocrine, pulmonary, cardiac, gastrointestinal, genitourinary, musculoskeletal, integument and psychiatric systems are negative, except as otherwise noted.       Objective    Exam  BP (!) 152/80   Pulse 61   Temp 97.9  F (36.6  C) (Oral)   Resp 16   Ht 1.702 m (5' 7\")   Wt 48.9 kg (107 lb 11.2 oz)   LMP  (LMP Unknown)   SpO2 100%   BMI 16.87 kg/m     Estimated body mass index is 16.87 kg/m  as calculated from the following:    Height as of this encounter: 1.702 m (5' 7\").    Weight as of this encounter: 48.9 kg (107 lb 11.2 oz).    Physical Exam  GENERAL: alert and no distress, thin and tearful   EYES: Eyes grossly normal to inspection and conjunctivae and sclerae normal  HENT: ear canals and TM's normal, nose and mouth without ulcers or lesions  NECK: no adenopathy, no asymmetry, masses, or scars  RESP: lungs clear to auscultation - no rales, rhonchi or wheezes  CV: regular rate and rhythm, normal S1 S2, no S3 or S4, no murmur, click or rub, no peripheral edema  ABDOMEN: soft, nontender, no hepatosplenomegaly, no masses and bowel sounds normal  MS: no gross musculoskeletal defects noted, no edema  SKIN: no suspicious lesions or rashes on exposed skin   NEURO: Normal strength and tone, mentation intact and speech " normal  PSYCH: mentation appears normal, affect down and tearful     Gait and balance assessed per Gait Speed Test.  Result as above.        5/13/2024     8:17 AM 5/28/2025     8:27 AM   MINI COG   Clock Draw Score 2 Normal 2 Normal   3 Item Recall 3 objects recalled 3 objects recalled   Mini Cog Total Score 5 5            Signed Electronically by: Manasa Watson MD    Answers submitted by the patient for this visit:  Patient Health Questionnaire (Submitted on 5/27/2025)  If you checked off any problems, how difficult have these problems made it for you to do your work, take care of things at home, or get along with other people?: Very difficult  PHQ9 TOTAL SCORE: 13  Patient Health Questionnaire (G7) (Submitted on 5/27/2025)  AIDEN 7 TOTAL SCORE: 13

## 2025-05-28 NOTE — ASSESSMENT & PLAN NOTE
Continues to have intermittent dizziness.  Did improve with decreasing Wellbutrin dose and stopping amlodipine.  Now mainly notices with standing up.  Discussed that this seems consistent with at least some orthostasis.  She does endorse that she does not drink enough water.  -They are going to add fluid intake to her daily check list, discussed goal of at least 60 ounces  -Continue daily protein drink as well

## 2025-05-28 NOTE — PATIENT INSTRUCTIONS
Really work to increase fluid intake, goal at least 60 oz daily.     Patient Education   Preventive Care Advice   This is general advice given by our system to help you stay healthy. However, your care team may have specific advice just for you. Please talk to your care team about your preventive care needs.  Nutrition  Eat 5 or more servings of fruits and vegetables each day.  Try wheat bread, brown rice and whole grain pasta (instead of white bread, rice, and pasta).  Get enough calcium and vitamin D. Check the label on foods and aim for 100% of the RDA (recommended daily allowance).  Lifestyle  Exercise at least 150 minutes each week  (30 minutes a day, 5 days a week).  Do muscle strengthening activities 2 days a week. These help control your weight and prevent disease.  No smoking.  Wear sunscreen to prevent skin cancer.  Have a dental exam and cleaning every 6 months.  Yearly exams  See your health care team every year to talk about:  Any changes in your health.  Any medicines your care team has prescribed.  Preventive care, family planning, and ways to prevent chronic diseases.  Shots (vaccines)   HPV shots (up to age 26), if you've never had them before.  Hepatitis B shots (up to age 59), if you've never had them before.  COVID-19 shot: Get this shot when it's due.  Flu shot: Get a flu shot every year.  Tetanus shot: Get a tetanus shot every 10 years.  Pneumococcal, hepatitis A, and RSV shots: Ask your care team if you need these based on your risk.  Shingles shot (for age 50 and up)  General health tests  Diabetes screening:  Starting at age 35, Get screened for diabetes at least every 3 years.  If you are younger than age 35, ask your care team if you should be screened for diabetes.  Cholesterol test: At age 39, start having a cholesterol test every 5 years, or more often if advised.  Bone density scan (DEXA): At age 50, ask your care team if you should have this scan for osteoporosis (brittle  bones).  Hepatitis C: Get tested at least once in your life.  STIs (sexually transmitted infections)  Before age 24: Ask your care team if you should be screened for STIs.  After age 24: Get screened for STIs if you're at risk. You are at risk for STIs (including HIV) if:  You are sexually active with more than one person.  You don't use condoms every time.  You or a partner was diagnosed with a sexually transmitted infection.  If you are at risk for HIV, ask about PrEP medicine to prevent HIV.  Get tested for HIV at least once in your life, whether you are at risk for HIV or not.  Cancer screening tests  Cervical cancer screening: If you have a cervix, begin getting regular cervical cancer screening tests starting at age 21.  Breast cancer scan (mammogram): If you've ever had breasts, begin having regular mammograms starting at age 40. This is a scan to check for breast cancer.  Colon cancer screening: It is important to start screening for colon cancer at age 45.  Have a colonoscopy test every 10 years (or more often if you're at risk) Or, ask your provider about stool tests like a FIT test every year or Cologuard test every 3 years.  To learn more about your testing options, visit:   .  For help making a decision, visit:   https://bit.ly/ot78591.  Prostate cancer screening test: If you have a prostate, ask your care team if a prostate cancer screening test (PSA) at age 55 is right for you.  Lung cancer screening: If you are a current or former smoker ages 50 to 80, ask your care team if ongoing lung cancer screenings are right for you.  For informational purposes only. Not to replace the advice of your health care provider. Copyright   2023 [facility]. All rights reserved. Clinically reviewed by the [facility]. [employer] 611669 - REV 01/24.  Learning About Activities of Daily Living  What are activities of daily living?     Activities of daily living (ADLs) are the basic  self-care tasks you do every day. These include eating, bathing, dressing, and moving around.  As you age, and if you have health problems, you may find that it's harder to do some of these tasks. If so, your doctor can suggest ideas that may help.  To measure what kind of help you may need, your doctor will ask how well you are able to do ADLs. Let your doctor know if there are any tasks that you are having trouble doing. This is an important first step to getting help. And when you have the help you need, you can stay as independent as possible.  How will a doctor assess your ADLs?  Asking about ADLs is part of a routine health checkup your doctor will likely do as you age. Your health check might be done in a doctor's office, in your home, or at a hospital. The goal is to find out if you are having any problems that could make it hard to care for yourself or that make it unsafe for you to be on your own.  To measure your ADLs, your doctor will ask how hard it is for you to do routine tasks. Your doctor may also want to know if you have changed the way you do a task because of a health problem. Your doctor may watch how you:  Walk back and forth.  Keep your balance while you stand or walk.  Move from sitting to standing or from a bed to a chair.  Button or unbutton a shirt or sweater.  Remove and put on your shoes.  It's common to feel a little worried or anxious if you find you can't do all the things you used to be able to do. Talking with your doctor about ADLs is a way to make sure you're as safe as possible and able to care for yourself as well as you can. You may want to bring a caregiver, friend, or family member to your checkup. They can help you talk to your doctor.  Follow-up care is a key part of your treatment and safety. Be sure to make and go to all appointments, and call your doctor if you are having problems. It's also a good idea to know your test results and keep a list of the medicines you  take.  Current as of: October 24, 2024  Content Version: 14.4    4172-1748 [employer]   Care instructions adapted under license by your healthcare professional. If you have questions about a medical condition or this instruction, always ask your healthcare professional. [employer] disclaims any warranty or liability for your use of this information.    Preventing Falls: Care Instructions  Injuries and health problems such as trouble walking or poor eyesight can increase your risk of falling. So can some medicines. But there are things you can do to help prevent falls. You can exercise to get stronger. You can also arrange your home to make it safer.    Talk to your doctor about the medicines you take. Ask if any of them increase the risk of falls and whether they can be changed or stopped.   Try to exercise regularly. It can help improve your strength and balance. This can help lower your risk of falling.         Practice fall safety and prevention.   Wear low-heeled shoes that fit well and give your feet good support. Talk to your doctor if you have foot problems that make this hard.  Carry a cellphone or wear a medical alert device that you can use to call for help.  Use stepladders instead of chairs to reach high objects. Don't climb if you're at risk for falls. Ask for help, if needed.  Wear the correct eyeglasses, if you need them.        Make your home safer.   Remove rugs, cords, clutter, and furniture from walkways.  Keep your house well lit. Use night-lights in hallways and bathrooms.  Install and use sturdy handrails on stairways.  Wear nonskid footwear, even inside. Don't walk barefoot or in socks without shoes.        Be safe outside.   Use handrails, curb cuts, and ramps whenever possible.  Keep your hands free by using a shoulder bag or backpack.  Try to walk in well-lit areas. Watch out for uneven ground, changes in pavement, and debris.  Be careful in the winter. Walk on the  "grass or gravel when sidewalks are slippery. Use de-icer on steps and walkways. Add non-slip devices to shoes.    Put grab bars and nonskid mats in your shower or tub and near the toilet. Try to use a shower chair or bath bench when bathing.   Get into a tub or shower by putting in your weaker leg first. Get out with your strong side first. Have a phone or medical alert device in the bathroom with you.   Where can you learn more?  Go to https://www.[employer].net/patiented  Enter G117 in the search box to learn more about \"Preventing Falls: Care Instructions.\"  Current as of: July 31, 2024  Content Version: 14.4    4499-3371 [employer].   Care instructions adapted under license by your healthcare professional. If you have questions about a medical condition or this instruction, always ask your healthcare professional. [employer] disclaims any warranty or liability for your use of this information.    Learning About Stress  What is stress?     Stress is your body's response to a hard situation. Your body can have a physical, emotional, or mental response. Stress is a fact of life for most people, and it affects everyone differently. What causes stress for you may not be stressful for someone else.  A lot of things can cause stress. You may feel stress when you go on a job interview, take a test, or run a race. This kind of short-term stress is normal and even useful. It can help you if you need to work hard or react quickly. For example, stress can help you finish an important job on time.  Long-term stress is caused by ongoing stressful situations or events. Examples of long-term stress include long-term health problems, ongoing problems at work, or conflicts in your family. Long-term stress can harm your health.  How does stress affect your health?  When you are stressed, your body responds as though you are in danger. It makes hormones that speed up your heart, make you breathe faster, and " give you a burst of energy. This is called the fight-or-flight stress response. If the stress is over quickly, your body goes back to normal and no harm is done.  But if stress happens too often or lasts too long, it can have bad effects. Long-term stress can make you more likely to get sick, and it can make symptoms of some diseases worse. If you tense up when you are stressed, you may develop neck, shoulder, or low back pain. Stress is linked to high blood pressure and heart disease.  Stress also harms your emotional health. It can make you arrington, tense, or depressed. Your relationships may suffer, and you may not do well at work or school.  What can you do to manage stress?  You can try these things to help manage stress:   Do something active. Exercise or activity can help reduce stress. Walking is a great way to get started. Even everyday activities such as housecleaning or yard work can help.  Try yoga or jack chi. These techniques combine exercise and meditation. You may need some training at first to learn them.  Do something you enjoy. For example, listen to music or go to a movie. Practice your hobby or do volunteer work.  Meditate. This can help you relax, because you are not worrying about what happened before or what may happen in the future.  Do guided imagery. Imagine yourself in any setting that helps you feel calm. You can use online videos, books, or a teacher to guide you.  Do breathing exercises. For example:  From a standing position, bend forward from the waist with your knees slightly bent. Let your arms dangle close to the floor.  Breathe in slowly and deeply as you return to a standing position. Roll up slowly and lift your head last.  Hold your breath for just a few seconds in the standing position.  Breathe out slowly and bend forward from the waist.  Let your feelings out. Talk, laugh, cry, and express anger when you need to. Talking with supportive friends or family, a counselor, or a  "mack leader about your feelings is a healthy way to relieve stress. Avoid discussing your feelings with people who make you feel worse.  Write. It may help to write about things that are bothering you. This helps you find out how much stress you feel and what is causing it. When you know this, you can find better ways to cope.  What can you do to prevent stress?  You might try some of these things to help prevent stress:  Manage your time. This helps you find time to do the things you want and need to do.  Get enough sleep. Your body recovers from the stresses of the day while you are sleeping.  Get support. Your family, friends, and community can make a difference in how you experience stress.  Limit your news feed. Avoid or limit time on social media or news that may make you feel stressed.  Do something active. Exercise or activity can help reduce stress. Walking is a great way to get started.  Where can you learn more?  Go to https://www.[employer].net/patiented  Enter N032 in the search box to learn more about \"Learning About Stress.\"  Current as of: October 24, 2024  Content Version: 14.4    8279-3027 [employer].   Care instructions adapted under license by your healthcare professional. If you have questions about a medical condition or this instruction, always ask your healthcare professional. [employer] disclaims any warranty or liability for your use of this information.    Learning About Sleeping Well  What does sleeping well mean?     Sleeping well means getting enough sleep to feel good and stay healthy. How much sleep is enough varies among people.  The number of hours you sleep and how you feel when you wake up are both important. If you do not feel refreshed, you probably need more sleep. Another sign of not getting enough sleep is feeling tired during the day.  Experts recommend that adults get at least 7 or more hours of sleep per day. Children and older adults need more " "sleep.  Why is getting enough sleep important?  Getting enough quality sleep is a basic part of good health. When your sleep suffers, your physical health, mood, and your thoughts can suffer too. You may find yourself feeling more grumpy or stressed. Not getting enough sleep also can lead to serious problems, including injury, accidents, anxiety, and depression.  What might cause poor sleeping?  Many things can cause sleep problems, including:  Changes to your sleep schedule.  Stress. Stress can be caused by fear about a single event, such as giving a speech. Or you may have ongoing stress, such as worry about work or school.  Depression, anxiety, and other mental or emotional conditions.  Changes in your sleep habits or surroundings. This includes changes that happen where you sleep, such as noise, light, or sleeping in a different bed. It also includes changes in your sleep pattern, such as having jet lag or working a late shift.  Health problems, such as pain, breathing problems, and restless legs syndrome.  Lack of regular exercise.  Using alcohol, nicotine, or caffeine before bed.  How can you help yourself?  Here are some tips that may help you sleep more soundly and wake up feeling more refreshed.  Your sleeping area   Use your bedroom only for sleeping and sex. A bit of light reading may help you fall asleep. But if it doesn't, do your reading elsewhere in the house. Try not to use your TV, computer, smartphone, or tablet while you are in bed.  Be sure your bed is big enough to stretch out comfortably, especially if you have a sleep partner.  Keep your bedroom quiet, dark, and cool. Use curtains, blinds, or a sleep mask to block out light. To block out noise, use earplugs, soothing music, or a \"white noise\" machine.  Your evening and bedtime routine   Create a relaxing bedtime routine. You might want to take a warm shower or bath, or listen to soothing music.  Go to bed at the same time every night. And get " "up at the same time every morning, even if you feel tired.  What to avoid   Limit caffeine (coffee, tea, caffeinated sodas) during the day, and don't have any for at least 6 hours before bedtime.  Avoid drinking alcohol before bedtime. Alcohol can cause you to wake up more often during the night.  Try not to smoke or use tobacco, especially in the evening. Nicotine can keep you awake.  Limit naps during the day, especially close to bedtime.  Avoid lying in bed awake for too long. If you can't fall asleep or if you wake up in the middle of the night and can't get back to sleep within about 20 minutes, get out of bed and go to another room until you feel sleepy.  Avoid taking medicine right before bed that may keep you awake or make you feel hyper or energized. Your doctor can tell you if your medicine may do this and if you can take it earlier in the day.  If you can't sleep   Imagine yourself in a peaceful, pleasant scene. Focus on the details and feelings of being in a place that is relaxing.  Get up and do a quiet or boring activity until you feel sleepy.  Avoid drinking any liquids before going to bed to help prevent waking up often to use the bathroom.  Where can you learn more?  Go to https://www.[employer].net/patiented  Enter J942 in the search box to learn more about \"Learning About Sleeping Well.\"  Current as of: July 31, 2024  Content Version: 14.4    0943-0764 [employer].   Care instructions adapted under license by your healthcare professional. If you have questions about a medical condition or this instruction, always ask your healthcare professional. [employer] disclaims any warranty or liability for your use of this information.    Learning About Depression Screening  What is depression screening?  Depression screening is a way to see if you have depression symptoms. It may be done by a doctor or counselor. It's often part of a routine checkup. That's because your mental health " "is just as important as your physical health.  Depression is a mental health condition that affects how you feel, think, and act. You may:  Have less energy.  Lose interest in your daily activities.  Feel sad and grouchy for a long time.  Depression is very common. It affects people of all ages.  Many things can lead to depression. Some people become depressed after they have a stroke or find out they have a major illness like cancer or heart disease. The death of a loved one or a breakup may lead to depression. It can run in families. Most experts believe that a combination of inherited genes and stressful life events can cause it.  What happens during screening?  You may be asked to fill out a form about your depression symptoms. You and the doctor will discuss your answers. The doctor may ask you more questions to learn more about how you think, act, and feel.  What happens after screening?  If you have symptoms of depression, your doctor will talk to you about your options.  Doctors usually treat depression with medicines or counseling. Often, combining the two works best. Many people don't get help because they think that they'll get over the depression on their own. But people with depression may not get better unless they get treatment.  The cause of depression is not well understood. There may be many factors involved. But if you have depression, it's not your fault.  A serious symptom of depression is thinking about death or suicide. If you or someone you care about talks about this or about feeling hopeless, get help right away.  It's important to know that depression can be treated. Medicine, counseling, and self-care may help.  Where can you learn more?  Go to https://www.[employer].net/patiented  Enter T185 in the search box to learn more about \"Learning About Depression Screening.\"  Current as of: July 31, 2024  Content Version: 14.4    8061-7381 [facility].   Care instructions adapted " under license by your healthcare professional. If you have questions about a medical condition or this instruction, always ask your healthcare professional. [email or web address] disclaims any warranty or liability for your use of this information.

## 2025-05-28 NOTE — ASSESSMENT & PLAN NOTE
Continues to follow at [facility]. EGD 8/2024 without eosinophils and they have tapered budesonide to every other day without significant worsening in swallowing.   - Continue current budesonide rinses  - Continue to f/up with [facility]

## 2025-05-28 NOTE — ASSESSMENT & PLAN NOTE
We discussed healthy lifestyle, nutrition, cardiovascular risk reduction, self care, safety, sunscreen, and timing of cancer screening.  Health maintenance screening and immunizations reviewed with the patient.    - Labs updated and normal 1/2025, needs lipids but we will do next time   - Mammogram BIRADS1 5/6/25  - Colonoscopy due 7/2025, will discuss more next time, can wait until mood is doing better  - UTD on vaccines, will get Tdap 2026

## 2025-05-28 NOTE — ASSESSMENT & PLAN NOTE
She is still struggling with the oral appliance.  Having some dental pain.  They are going to plan to try to advance a little bit further, however if pain continues, likely will try CPAP instead.  -Agree with this plan, they will follow-up with sleep medicine if they would like to try CPAP

## 2025-05-28 NOTE — ASSESSMENT & PLAN NOTE
Sleep has improved with CBT-I, she is able to fall asleep much more easily.   - Continue CBT-I techniques and oral appliance

## 2025-06-02 ENCOUNTER — THERAPY VISIT (OUTPATIENT)
Age: 70
End: 2025-06-02
Payer: COMMERCIAL

## 2025-06-02 DIAGNOSIS — M25.512 LEFT SHOULDER PAIN: Primary | ICD-10-CM

## 2025-06-02 PROCEDURE — 97140 MANUAL THERAPY 1/> REGIONS: CPT | Mod: GP | Performed by: PHYSICAL THERAPIST

## 2025-06-02 PROCEDURE — 97110 THERAPEUTIC EXERCISES: CPT | Mod: GP | Performed by: PHYSICAL THERAPIST

## 2025-06-03 ENCOUNTER — MYC MEDICAL ADVICE (OUTPATIENT)
Dept: NEUROLOGY | Facility: CLINIC | Age: 70
End: 2025-06-03
Payer: MEDICARE

## 2025-06-04 NOTE — TELEPHONE ENCOUNTER
MRI brain 5/27/2025 with and without contrast  1.  Normal head MRI  Above scan is normal.    I heard she has difficulty with some increased tremors, recently started some Aricept  She does have a relatively low pulse 46-58.  Unclear if the increased tremors could be from the low-dose of Aricept  I would recommend first holding the Aricept for at least 2 weeks, staying really well-hydrated and see if the tremors are a little bit better  Usually cognitive decline does not happen quickly but can be exacerbated by excessive fatigue/infection/depression.  If it feels like depression has gotten worse possibly talking with primary MD whether they would make further adjustments in her antidepressant medications but would first hold the Aricept to see how things go before starting another medicine.    kyle Morel MD on 6/4/2025 at 3:26 PM

## 2025-06-09 ENCOUNTER — THERAPY VISIT (OUTPATIENT)
Age: 70
End: 2025-06-09
Payer: COMMERCIAL

## 2025-06-09 DIAGNOSIS — M25.512 LEFT SHOULDER PAIN: Primary | ICD-10-CM

## 2025-06-09 PROCEDURE — 97110 THERAPEUTIC EXERCISES: CPT | Mod: GP | Performed by: PHYSICAL THERAPIST

## 2025-06-09 PROCEDURE — 97140 MANUAL THERAPY 1/> REGIONS: CPT | Mod: GP | Performed by: PHYSICAL THERAPIST

## 2025-06-12 ENCOUNTER — NURSE TRIAGE (OUTPATIENT)
Dept: INTERNAL MEDICINE | Facility: CLINIC | Age: 70
End: 2025-06-12
Payer: MEDICARE

## 2025-06-12 NOTE — TELEPHONE ENCOUNTER
"Spoke with pt  Marco A and relayed recommendations. Educated  on EMPATH unit at [facility].  states she is doing a little better than earlier, states he will talk to Katty but feels emergency care may not be necessary at this time and is worried \"something like that would be traumatic for her.\" States they will keep EMPATH in mind and go there if symptoms worsen. Scheduled for virtual visit with Dr. Watson 6/24.  "

## 2025-06-12 NOTE — TELEPHONE ENCOUNTER
"Nurse Triage SBAR    Is this a 2nd Level Triage? YES, LICENSED PRACTITIONER REVIEW IS REQUIRED    Situation: Depression    Background: Pt has hx of depression, takes wellbutrin. Recently discontinued donepezil.    Assessment: Pt , Marco A, calling about Katty's worsening depression. States today it was difficult for her to get out of bed, when she did get out of bed she was moving so slow and weak. Writer requested to speak with pt,  placed this writer on speakerphone to speak with both of them. Pt denies thoughts of self-harm, suicide, or harming others. States she is not having thoughts of death. States she is thinking about \"If I was gone, I'd leave behind such a mess for Alexis.\" Pt states she hasn't really been able to socialize, does not work, unable to take care of tasks around the house, cannot concentrate. Pt states \"It is hard to get me out of the house,\" even to go to PT appointments for unrelated injury.  states she is often tearful, pt became tearful several times throughout the phone call.  Denies drug use, has a few sips of wine occasionally with dinner. Pt used to see therapist in [facility] but he left the practice recently, is scheduled to see a new therapist next week. During the call, pt apologized to writer, stating \"I'm sorry to drag you into this.\" Writer informed pt she has no reason to apologize and that I am glad she called. Pt states she feels weak and unsteady at times, but denies other physical symptoms    Protocol Recommended Disposition:   Go to ED Now    Recommendation: Recommended pt be seen in ED based on severity of symptoms and functional impairment. Pt and  stated they did not want to go to the ED,  stated \"what are they really gonna be able to do for her there?\" Writer reiterated depression symptoms are severe and ED may be able to provide some interventions for her faster than they would be able to get in for a clinic visit. Pt and  still " "opposed to ED. Writer stated she would send this information to PCP to see if PCP has other recommendations or would be willing to see pt in clinic instead.     Routed to provider    Does the patient meet one of the following criteria for ADS visit consideration? 16+ years old, with an [facility] PCP     TIP  Providers, please consider if this condition is appropriate for management at one of our Acute and Diagnostic Services sites.     If patient is a good candidate, please use dotphrase <dot>triageresponse and select Refer to ADS to document.      Reason for Disposition   Depression and unable to do any of normal activities (e.g., self care, school, work; in comparison to baseline).    Additional Information   Negative: Patient attempted suicide   Negative: Patient is threatening suicide now   Negative: Violent behavior, or threatening to physically hurt or kill someone   Negative: Patient is very confused (disoriented, slurred speech) and no other adult (e.g., friend or family member) available   Negative: Difficult to awaken or acting confused (disoriented, slurred speech) and new-onset   Negative: Sounds like a life-threatening emergency to the triager   Negative: Suicide thoughts, threats, attempts, or questions   Negative: Questions or concerns about alcohol use, unhealthy alcohol use, binge drinking, intoxication, or withdrawal   Negative: Questions or concerns about substance use (drug use), unhealthy drug use, intoxication, or withdrawal   Negative: Anxiety is main problem or symptom    Answer Assessment - Initial Assessment Questions  1. CONCERN: \"What happened that made you call today?\"       called. Wasn't getting out of bed, hard to go down stairs  2. DEPRESSION SYMPTOM SCREENING: \"How are you feeling overall?\" (e.g., decreased energy, increased sleeping or difficulty sleeping, difficulty concentrating, feelings of sadness, guilt, hopelessness, or worthlessness)      Decreased energy, difficulty " "concentrating, \"she's been crying a lot\", has sleep apnea  3. RISK OF HARM - SUICIDAL IDEATION:  \"Do you ever have thoughts of hurting or killing yourself?\"  (e.g., yes, no, no but preoccupation with thoughts about death)      Not thinking about harming herself, not thinking about death, \"I'm just thinking about what a mess this all is for Alexis.\"  4. RISK OF HARM - HOMICIDAL IDEATION:  \"Do you ever have thoughts of hurting or killing someone else?\"  (e.g., yes, no, no but preoccupation with thoughts about death)      Denies  5. FUNCTIONAL IMPAIRMENT: \"How have things been going for you overall? Have you had more difficulty than usual doing your normal daily activities?\"  (e.g., better, same, worse; self-care, school, work, interactions)      Not really socializing, doesn't work, not really able to keep up with things at home, \"it's hard to get me out of the house\"  6. SUPPORT: \"Who is with you now?\" \"Who do you live with?\" \"Do you have family or friends who you can talk to?\"         7. THERAPIST: \"Do you have a counselor or therapist?\" If Yes, ask: \"What is their name?\"      Used to see someone in [facility] but he left the practice, will be seeing someone new next week  8. STRESSORS: \"Has there been any new stress or recent changes in your life?\"      Transition in therapy  9. ALCOHOL USE OR SUBSTANCE USE (DRUG USE): \"Do you drink alcohol or use any illegal drugs?\"      Denies,  says she has a few sips of wine with dinner occasionally  10. OTHER: \"Do you have any other physical symptoms right now?\" (e.g., fever)        Walking slowly and unsteady at times, in PT for previous injury    Protocols used: Depression-A-OH    "

## 2025-06-12 NOTE — TELEPHONE ENCOUNTER
Thanks for update. I did also message the psychiatry provider she is scheduled with in August to see if her appt could be moved up.

## 2025-06-12 NOTE — TELEPHONE ENCOUNTER
LM on patient's phone regarding rescheduled appointment to be seen tomorrow by PCP for [facility]V.    Contacted  and was able to get a hold of him to relay information on appointment tomorrow. He thanked for the call and confirmed appointment

## 2025-06-12 NOTE — TELEPHONE ENCOUNTER
I'm sorry to hear she is feeling worse. I placed [facility] referral last visit but it doesn't look like appt is until August unfortunately.     EMPATH unit would be the best choice vs ED.     I don't have any appts available until 6/24 (which we can schedule her in as a precaution) and I don't think double booking would be appropriate because I would need to spend the full 30 minutes with Katty and Alexis.     Can they call her therapist for more urgent visit as well?

## 2025-06-14 ENCOUNTER — PATIENT OUTREACH (OUTPATIENT)
Dept: CARE COORDINATION | Facility: CLINIC | Age: 70
End: 2025-06-14
Payer: MEDICARE

## 2025-06-16 ENCOUNTER — THERAPY VISIT (OUTPATIENT)
Age: 70
End: 2025-06-16
Payer: COMMERCIAL

## 2025-06-16 DIAGNOSIS — M25.512 LEFT SHOULDER PAIN: Primary | ICD-10-CM

## 2025-06-16 PROCEDURE — 97110 THERAPEUTIC EXERCISES: CPT | Mod: GP | Performed by: PHYSICAL THERAPIST

## 2025-06-16 PROCEDURE — 97140 MANUAL THERAPY 1/> REGIONS: CPT | Mod: GP | Performed by: PHYSICAL THERAPIST
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
## 2025-06-18 ENCOUNTER — TELEPHONE (OUTPATIENT)
Dept: INTERNAL MEDICINE | Facility: CLINIC | Age: 70
End: 2025-06-18
Payer: MEDICARE

## 2025-06-18 NOTE — TELEPHONE ENCOUNTER
Contacts       Contact Date/Time Type Contact Phone/Fax    06/18/2025 12:46 PM CDT Phone (Incoming) Marco A Patton (Emergency Contact) [phone] (M)    06/18/2025 04:11 PM CDT Phone (Outgoing) Marco A Patton (Emergency Contact) [phone] (M)    Talked with Family Member           Contacted spouse to: Schedule an appointment    Marco A is listed on consent to communicate. Accepted offer to schedule 06/25/25 with PCP

## 2025-06-18 NOTE — TELEPHONE ENCOUNTER
Reason for Call:  Appointment Request    Patient requesting this type of appt: Chronic Diease Management/Medication/Follow-Up    Requested provider: Manasa Watson    Reason patient unable to be scheduled: Needs to be scheduled by clinic    When does patient want to be seen/preferred time: 6/24    Comments: pt had a recent visit with pcp, where she was told that they were going to schedule a follow up video visit for her with pcp on 6/24 (notes are in the visit summary). Clinic will need to call pt to schedule an appt.    Could we send this information to you [facility] or would you prefer to receive a phone call?:   Patient would prefer a phone call   Okay to leave a detailed message?: Yes at Other phone number:  [phone]    Call taken on 6/18/2025 at 12:47 PM by Ct Wolff

## 2025-06-18 NOTE — TELEPHONE ENCOUNTER
Looks like there was an appt scheduled 6/24 but then cancelled?     Either way, yes, lets use same day slot 6/25

## 2025-06-25 ENCOUNTER — VIRTUAL VISIT (OUTPATIENT)
Dept: INTERNAL MEDICINE | Facility: CLINIC | Age: 70
End: 2025-06-25
Payer: MEDICARE

## 2025-06-25 DIAGNOSIS — F32.1 CURRENT MODERATE EPISODE OF MAJOR DEPRESSIVE DISORDER WITHOUT PRIOR EPISODE (H): Primary | ICD-10-CM

## 2025-06-25 DIAGNOSIS — F51.01 PRIMARY INSOMNIA: ICD-10-CM

## 2025-06-25 DIAGNOSIS — I73.00 RAYNAUD'S SYNDROME WITHOUT GANGRENE: ICD-10-CM

## 2025-06-25 DIAGNOSIS — R42 DIZZINESS: ICD-10-CM

## 2025-06-25 DIAGNOSIS — R41.3 MEMORY CHANGES: ICD-10-CM

## 2025-06-25 DIAGNOSIS — G47.33 OBSTRUCTIVE SLEEP APNEA SYNDROME: ICD-10-CM

## 2025-06-25 PROCEDURE — 1126F AMNT PAIN NOTED NONE PRSNT: CPT | Mod: 95 | Performed by: INTERNAL MEDICINE

## 2025-06-25 PROCEDURE — 98006 SYNCH AUDIO-VIDEO EST MOD 30: CPT | Performed by: INTERNAL MEDICINE

## 2025-06-25 NOTE — ASSESSMENT & PLAN NOTE
Ongoing. Did have neuropsychiatric testing appt last week Friday 6/20. Unfortunately, couldn't complete as Nori was getting frustrated. Depression really impeding ability to complete questionnaires and testing. - Discussed that this really is supportive that a lot of her current cognitive issues are due to uncontrolled depression, plan for this as per MDD  - Would still stay off aricept  - There is plan for repeat neuropsych testing 1/2026

## 2025-06-25 NOTE — PROGRESS NOTES
Katty is a 70 year old who is being evaluated via a billable video visit.    How would you like to obtain your AVS? MyChart  If the video visit is dropped, the invitation should be resent by: Send to e-mail at: [email or web address]  Will anyone else be joining your video visit? Yes:  . How would they like to receive their invitation? Send to e-mail at: [email or web address]      Assessment & Plan   Problem List Items Addressed This Visit          Cardiovascular/Peripheral Vascular    Raynaud's syndrome without gangrene    Still having intermittent dizziness.  Especially during the summer months, we will stay off of amlodipine.            Respiratory/Allergy    Obstructive sleep apnea syndrome    They report she had f/up with sleep medicine this morning. Going to call it with appliance and switch to CPAP. This may help sleep and cognition.             Behavioral Health    Current moderate episode of major depressive disorder without prior episode (H) - Primary    Here for f/up of uncontrolled depression. No significant change since last visit (6/13) but at least not worse. We increased wellbutrin from 50 mg BID -> 50 AM and 100 PM last time. They have started CBT with new therapist. She has two psychiatry appts coming up, one in July with geriatric psychiatry at [facility] and one her at [facility] with [facility].   - For today, we are going to have them switch AM dose of wellbutrin to 100 mg and PM dose to 50 mg to see if that affects sleep less  - Discussed we would continue this dose for at least two more weeks, if tolerating/not causing more dizziness and mood still poor, could consider increase to 100 mg BID  - F/up with therapy and psychiatrists as planned  - F/up with me as scheduled 9/2/25, they will reach out with acute needs prior if they come up             Neurology/Sleep    Insomnia    Improved with CBT-I. Now switching to CPAP for sleep apnea, which may also help.          Memory changes     Ongoing. Did have neuropsychiatric testing appt last week Friday 6/20. Unfortunately, couldn't complete as Nori was getting frustrated. Depression really impeding ability to complete questionnaires and testing. - Discussed that this really is supportive that a lot of her current cognitive issues are due to uncontrolled depression, plan for this as per MDD  - Would still stay off aricept  - There is plan for repeat neuropsych testing 1/2026         Dizziness    Ongoing intermittent dizziness. Vague. Not specifically with position change and had negative orthostatic vitals at home today. Almost seems more of a disequilibrium and just low confidence in her ability to go up and down stairs, on uneven terrain, etc.   - Continue work with   - Did offer PT referral to [facility], hold off for now  - F/up in 2 months              The longitudinal plan of care for the diagnosis(es)/condition(s) as documented were addressed during this visit. Due to the added complexity in care, I will continue to support Katty in the subsequent management and with ongoing continuity of care.     Follow-up in Sept as scheduled, sooner if anything worsens     Subjective   Katty is a 70 year old, presenting for the following health issues:  Follow Up (Depression follow-up. )        6/25/2025     1:25 PM   Additional Questions   Roomed by Kevin CISNEROS MA     Video Start Time: 1:37    HPI      Depression   How are you doing with your depression since your last visit? Improved slightly   Are you having other symptoms that might be associated with depression? No  Have you had a significant life event?  No   Are you feeling anxious or having panic attacks?   No  Do you have any concerns with your use of alcohol or other drugs? No    Katty is here for depression f/up. Says she is okay but still pretty flat and pretty discouraged. Still not tolerating oral appliance for SAGAR, so they are going to stop that.  Feels lightheaded and uncoordinated at times.     Saw sleep at [facility] this morning. Okay to bail on the appliance. They are going to start CPAP instead. Recommended higher dose of wellbutrin in AM and second dose in the afternoon instead.     Last Friday had neuropsych testing with Tita Mendez. Had to stop half way through, too much overlying depression. Plan to try again in 6 months.     Therapist at [facility]. They are going to be meeting with a geriatric psych     They did some orthostatic vitals, slight increase in HR but BP actually.      helping with balance.     Social History     Tobacco Use    Smoking status: Never    Smokeless tobacco: Never   Substance Use Topics    Alcohol use: Yes     Comment: Alcoholic Drinks/day: occ    Drug use: No         4/21/2025    11:13 AM 5/27/2025     4:33 PM 6/13/2025     3:26 PM   PHQ   PHQ-9 Total Score 13  13  16    Q9: Thoughts of better off dead/self-harm past 2 weeks Not at all Not at all Not at all        Patient-reported    Proxy-reported         2/17/2025     1:39 PM 4/21/2025    11:07 AM 5/27/2025     4:35 PM   AIDEN-7 SCORE   Total Score 8 (mild anxiety) 12 (moderate anxiety) 13 (moderate anxiety)   Total Score 8  12  13        Patient-reported         Suicide Assessment Five-step Evaluation and Treatment (SAFE-T)    How many servings of fruits and vegetables do you eat daily?  4 or more  On average, how many sweetened beverages do you drink each day (Examples: soda, juice, sweet tea, etc.  Do NOT count diet or artificially sweetened beverages)?   0  How many days per week do you exercise enough to make your heart beat faster? 3 or less  How many minutes a day do you exercise enough to make your heart beat faster? 20 - 29  How many days per week do you miss taking your medication? 0        Review of Systems  Constitutional, neuro, ENT, endocrine, pulmonary, cardiac, gastrointestinal, genitourinary, musculoskeletal, integument and psychiatric systems  are negative, except as otherwise noted.      Objective    Vitals - Patient Reported  Pain Score: No Pain (0)        Physical Exam   GENERAL: alert and no distress, tearful throughout  EYES: Eyes grossly normal to inspection.  No discharge or erythema, or obvious scleral/conjunctival abnormalities.  RESP: No audible wheeze, cough, or visible cyanosis.    SKIN: Visible skin clear. No significant rash, abnormal pigmentation or lesions.  NEURO: Cranial nerves grossly intact.  Mentation and speech appropriate for age.  PSYCH: Appropriate affect, tone, and pace of words          Video-Visit Details    Type of service:  Video Visit   Video End Time:2:00  Originating Location (pt. Location): Home    Distant Location (provider location):  On-site  Platform used for Video Visit: John  Signed Electronically by: Manasa Watson MD

## 2025-06-25 NOTE — ASSESSMENT & PLAN NOTE
Here for f/up of uncontrolled depression. No significant change since last visit (6/13) but at least not worse. We increased wellbutrin from 50 mg BID -> 50 AM and 100 PM last time. They have started CBT with new therapist. She has two psychiatry appts coming up, one in July with geriatric psychiatry at [facility] and one her at [facility] with [facility].   - For today, we are going to have them switch AM dose of wellbutrin to 100 mg and PM dose to 50 mg to see if that affects sleep less  - Discussed we would continue this dose for at least two more weeks, if tolerating/not causing more dizziness and mood still poor, could consider increase to 100 mg BID  - F/up with therapy and psychiatrists as planned  - F/up with me as scheduled 9/2/25, they will reach out with acute needs prior if they come up

## 2025-06-25 NOTE — ASSESSMENT & PLAN NOTE
They report she had f/up with sleep medicine this morning. Going to call it with appliance and switch to CPAP. This may help sleep and cognition.

## 2025-06-25 NOTE — ASSESSMENT & PLAN NOTE
Ongoing intermittent dizziness. Vague. Not specifically with position change and had negative orthostatic vitals at home today. Almost seems more of a disequilibrium and just low confidence in her ability to go up and down stairs, on uneven terrain, etc.   - Continue work with   - Did offer PT referral to [facility], hold off for now  - F/up in 2 months

## 2025-07-02 ENCOUNTER — THERAPY VISIT (OUTPATIENT)
Age: 70
End: 2025-07-02
Payer: COMMERCIAL

## 2025-07-02 DIAGNOSIS — M25.512 LEFT SHOULDER PAIN: Primary | ICD-10-CM

## 2025-07-02 PROCEDURE — 97110 THERAPEUTIC EXERCISES: CPT | Mod: GP | Performed by: PHYSICAL THERAPIST

## 2025-07-02 PROCEDURE — 97112 NEUROMUSCULAR REEDUCATION: CPT | Mod: GP | Performed by: PHYSICAL THERAPIST

## 2025-07-02 PROCEDURE — 97140 MANUAL THERAPY 1/> REGIONS: CPT | Mod: GP | Performed by: PHYSICAL THERAPIST

## 2025-07-14 ENCOUNTER — THERAPY VISIT (OUTPATIENT)
Age: 70
End: 2025-07-14
Payer: COMMERCIAL

## 2025-07-14 DIAGNOSIS — M25.512 LEFT SHOULDER PAIN: Primary | ICD-10-CM

## 2025-07-14 PROCEDURE — 97110 THERAPEUTIC EXERCISES: CPT | Mod: GP | Performed by: PHYSICAL THERAPIST

## 2025-07-14 PROCEDURE — 97140 MANUAL THERAPY 1/> REGIONS: CPT | Mod: GP | Performed by: PHYSICAL THERAPIST

## 2025-07-28 ENCOUNTER — THERAPY VISIT (OUTPATIENT)
Age: 70
End: 2025-07-28
Payer: COMMERCIAL

## 2025-07-28 DIAGNOSIS — M25.512 LEFT SHOULDER PAIN: Primary | ICD-10-CM

## 2025-07-28 PROCEDURE — 97110 THERAPEUTIC EXERCISES: CPT | Mod: GP | Performed by: PHYSICAL THERAPIST

## 2025-07-28 PROCEDURE — 97112 NEUROMUSCULAR REEDUCATION: CPT | Mod: GP | Performed by: PHYSICAL THERAPIST

## 2025-07-28 PROCEDURE — 97140 MANUAL THERAPY 1/> REGIONS: CPT | Mod: GP | Performed by: PHYSICAL THERAPIST

## 2025-08-10 ASSESSMENT — ANXIETY QUESTIONNAIRES
3. WORRYING TOO MUCH ABOUT DIFFERENT THINGS: MORE THAN HALF THE DAYS
4. TROUBLE RELAXING: SEVERAL DAYS
GAD7 TOTAL SCORE: 10
2. NOT BEING ABLE TO STOP OR CONTROL WORRYING: MORE THAN HALF THE DAYS
1. FEELING NERVOUS, ANXIOUS, OR ON EDGE: MORE THAN HALF THE DAYS
IF YOU CHECKED OFF ANY PROBLEMS ON THIS QUESTIONNAIRE, HOW DIFFICULT HAVE THESE PROBLEMS MADE IT FOR YOU TO DO YOUR WORK, TAKE CARE OF THINGS AT HOME, OR GET ALONG WITH OTHER PEOPLE: SOMEWHAT DIFFICULT
7. FEELING AFRAID AS IF SOMETHING AWFUL MIGHT HAPPEN: MORE THAN HALF THE DAYS
7. FEELING AFRAID AS IF SOMETHING AWFUL MIGHT HAPPEN: MORE THAN HALF THE DAYS
5. BEING SO RESTLESS THAT IT IS HARD TO SIT STILL: NOT AT ALL
8. IF YOU CHECKED OFF ANY PROBLEMS, HOW DIFFICULT HAVE THESE MADE IT FOR YOU TO DO YOUR WORK, TAKE CARE OF THINGS AT HOME, OR GET ALONG WITH OTHER PEOPLE?: SOMEWHAT DIFFICULT
GAD7 TOTAL SCORE: 10
6. BECOMING EASILY ANNOYED OR IRRITABLE: SEVERAL DAYS
GAD7 TOTAL SCORE: 10

## 2025-08-10 ASSESSMENT — PATIENT HEALTH QUESTIONNAIRE - PHQ9
SUM OF ALL RESPONSES TO PHQ QUESTIONS 1-9: 15
10. IF YOU CHECKED OFF ANY PROBLEMS, HOW DIFFICULT HAVE THESE PROBLEMS MADE IT FOR YOU TO DO YOUR WORK, TAKE CARE OF THINGS AT HOME, OR GET ALONG WITH OTHER PEOPLE: VERY DIFFICULT
SUM OF ALL RESPONSES TO PHQ QUESTIONS 1-9: 15

## 2025-08-11 ENCOUNTER — VIRTUAL VISIT (OUTPATIENT)
Dept: PSYCHIATRY | Facility: CLINIC | Age: 70
End: 2025-08-11
Attending: INTERNAL MEDICINE
Payer: MEDICARE

## 2025-08-11 DIAGNOSIS — F32.1 CURRENT MODERATE EPISODE OF MAJOR DEPRESSIVE DISORDER WITHOUT PRIOR EPISODE (H): ICD-10-CM

## 2025-08-11 ASSESSMENT — PAIN SCALES - GENERAL: PAINLEVEL_OUTOF10: NO PAIN (0)

## 2025-08-12 ENCOUNTER — THERAPY VISIT (OUTPATIENT)
Age: 70
End: 2025-08-12
Payer: COMMERCIAL

## 2025-08-12 DIAGNOSIS — M25.512 LEFT SHOULDER PAIN: Primary | ICD-10-CM

## 2025-08-12 PROCEDURE — 97140 MANUAL THERAPY 1/> REGIONS: CPT | Mod: GP | Performed by: PHYSICAL THERAPIST

## 2025-08-12 PROCEDURE — 97110 THERAPEUTIC EXERCISES: CPT | Mod: GP | Performed by: PHYSICAL THERAPIST

## 2025-08-18 ENCOUNTER — THERAPY VISIT (OUTPATIENT)
Age: 70
End: 2025-08-18
Payer: COMMERCIAL

## 2025-08-18 DIAGNOSIS — M25.512 LEFT SHOULDER PAIN: Primary | ICD-10-CM

## 2025-08-18 PROCEDURE — 97110 THERAPEUTIC EXERCISES: CPT | Mod: GP | Performed by: PHYSICAL THERAPIST

## 2025-08-18 PROCEDURE — 97140 MANUAL THERAPY 1/> REGIONS: CPT | Mod: GP | Performed by: PHYSICAL THERAPIST

## 2025-08-20 ENCOUNTER — NURSE TRIAGE (OUTPATIENT)
Dept: INTERNAL MEDICINE | Facility: CLINIC | Age: 70
End: 2025-08-20
Payer: MEDICARE

## 2025-08-20 DIAGNOSIS — R11.0 NAUSEA: Primary | ICD-10-CM

## 2025-08-20 RX ORDER — ONDANSETRON 4 MG/1
4 TABLET, ORALLY DISINTEGRATING ORAL EVERY 8 HOURS PRN
Qty: 30 TABLET | Refills: 1 | Status: SHIPPED | OUTPATIENT
Start: 2025-08-20

## 2025-09-01 ASSESSMENT — PATIENT HEALTH QUESTIONNAIRE - PHQ9
SUM OF ALL RESPONSES TO PHQ QUESTIONS 1-9: 16
10. IF YOU CHECKED OFF ANY PROBLEMS, HOW DIFFICULT HAVE THESE PROBLEMS MADE IT FOR YOU TO DO YOUR WORK, TAKE CARE OF THINGS AT HOME, OR GET ALONG WITH OTHER PEOPLE: EXTREMELY DIFFICULT
SUM OF ALL RESPONSES TO PHQ QUESTIONS 1-9: 16

## 2025-09-02 ENCOUNTER — OFFICE VISIT (OUTPATIENT)
Dept: INTERNAL MEDICINE | Facility: CLINIC | Age: 70
End: 2025-09-02
Payer: MEDICARE

## 2025-09-02 ENCOUNTER — THERAPY VISIT (OUTPATIENT)
Age: 70
End: 2025-09-02
Payer: COMMERCIAL

## 2025-09-02 VITALS
SYSTOLIC BLOOD PRESSURE: 138 MMHG | WEIGHT: 107 LBS | RESPIRATION RATE: 16 BRPM | DIASTOLIC BLOOD PRESSURE: 84 MMHG | BODY MASS INDEX: 16.79 KG/M2 | HEART RATE: 54 BPM | TEMPERATURE: 98.6 F | OXYGEN SATURATION: 95 % | HEIGHT: 67 IN

## 2025-09-02 DIAGNOSIS — R42 DIZZINESS: ICD-10-CM

## 2025-09-02 DIAGNOSIS — G47.33 OBSTRUCTIVE SLEEP APNEA SYNDROME: ICD-10-CM

## 2025-09-02 DIAGNOSIS — M25.512 LEFT SHOULDER PAIN: Primary | ICD-10-CM

## 2025-09-02 DIAGNOSIS — R41.3 MEMORY CHANGES: ICD-10-CM

## 2025-09-02 DIAGNOSIS — F32.1 CURRENT MODERATE EPISODE OF MAJOR DEPRESSIVE DISORDER WITHOUT PRIOR EPISODE (H): Primary | ICD-10-CM

## 2025-09-02 DIAGNOSIS — I73.00 RAYNAUD'S SYNDROME WITHOUT GANGRENE: ICD-10-CM

## 2025-09-02 DIAGNOSIS — K20.0 EOSINOPHILIC ESOPHAGITIS: ICD-10-CM

## 2025-09-02 PROBLEM — H25.10 NUCLEAR SCLEROSIS: Status: ACTIVE | Noted: 2018-07-24

## 2025-09-02 PROCEDURE — 97110 THERAPEUTIC EXERCISES: CPT | Mod: GP | Performed by: PHYSICAL THERAPIST

## 2025-09-02 PROCEDURE — 3075F SYST BP GE 130 - 139MM HG: CPT | Performed by: INTERNAL MEDICINE

## 2025-09-02 PROCEDURE — 99214 OFFICE O/P EST MOD 30 MIN: CPT | Performed by: INTERNAL MEDICINE

## 2025-09-02 PROCEDURE — 3079F DIAST BP 80-89 MM HG: CPT | Performed by: INTERNAL MEDICINE

## 2025-09-02 PROCEDURE — G2211 COMPLEX E/M VISIT ADD ON: HCPCS | Performed by: INTERNAL MEDICINE

## 2025-09-02 RX ORDER — MIRTAZAPINE 7.5 MG/1
7.5 TABLET, FILM COATED ORAL AT BEDTIME
COMMUNITY
Start: 2025-09-02